# Patient Record
Sex: FEMALE | Race: WHITE | NOT HISPANIC OR LATINO | Employment: OTHER | URBAN - METROPOLITAN AREA
[De-identification: names, ages, dates, MRNs, and addresses within clinical notes are randomized per-mention and may not be internally consistent; named-entity substitution may affect disease eponyms.]

---

## 2018-11-01 ENCOUNTER — APPOINTMENT (OUTPATIENT)
Dept: CT IMAGING | Facility: HOSPITAL | Age: 68
End: 2018-11-01

## 2018-11-01 ENCOUNTER — APPOINTMENT (OUTPATIENT)
Dept: GENERAL RADIOLOGY | Facility: HOSPITAL | Age: 68
End: 2018-11-01

## 2018-11-01 ENCOUNTER — HOSPITAL ENCOUNTER (EMERGENCY)
Facility: HOSPITAL | Age: 68
Discharge: HOME | End: 2018-11-01
Attending: EMERGENCY MEDICINE | Admitting: EMERGENCY MEDICINE

## 2018-11-01 VITALS
BODY MASS INDEX: 21.71 KG/M2 | DIASTOLIC BLOOD PRESSURE: 79 MMHG | WEIGHT: 118 LBS | HEIGHT: 62 IN | SYSTOLIC BLOOD PRESSURE: 114 MMHG | RESPIRATION RATE: 14 BRPM | TEMPERATURE: 97.7 F | OXYGEN SATURATION: 95 % | HEART RATE: 71 BPM

## 2018-11-01 DIAGNOSIS — R07.89 ATYPICAL CHEST PAIN: Primary | ICD-10-CM

## 2018-11-01 DIAGNOSIS — Z72.0 TOBACCO USE: ICD-10-CM

## 2018-11-01 DIAGNOSIS — R03.0 ELEVATED BLOOD PRESSURE READING: ICD-10-CM

## 2018-11-01 LAB
ALBUMIN SERPL-MCNC: 4.44 G/DL (ref 3.2–4.8)
ALBUMIN/GLOB SERPL: 2.2 G/DL (ref 1.5–2.5)
ALP SERPL-CCNC: 88 U/L (ref 25–100)
ALT SERPL W P-5'-P-CCNC: 29 U/L (ref 7–40)
ANION GAP SERPL CALCULATED.3IONS-SCNC: 6 MMOL/L (ref 3–11)
AST SERPL-CCNC: 70 U/L (ref 0–33)
BASOPHILS # BLD AUTO: 0.02 10*3/MM3 (ref 0–0.2)
BASOPHILS NFR BLD AUTO: 0.2 % (ref 0–1)
BILIRUB SERPL-MCNC: 0.1 MG/DL (ref 0.3–1.2)
BNP SERPL-MCNC: 13 PG/ML (ref 0–100)
BUN BLD-MCNC: 9 MG/DL (ref 9–23)
BUN/CREAT SERPL: 9.2 (ref 7–25)
CALCIUM SPEC-SCNC: 9.2 MG/DL (ref 8.7–10.4)
CHLORIDE SERPL-SCNC: 102 MMOL/L (ref 99–109)
CO2 SERPL-SCNC: 26 MMOL/L (ref 20–31)
CREAT BLD-MCNC: 0.98 MG/DL (ref 0.6–1.3)
D DIMER PPP FEU-MCNC: 0.58 MG/L (FEU) (ref 0–0.5)
DEPRECATED RDW RBC AUTO: 50 FL (ref 37–54)
EOSINOPHIL # BLD AUTO: 0.16 10*3/MM3 (ref 0–0.3)
EOSINOPHIL NFR BLD AUTO: 1.9 % (ref 0–3)
ERYTHROCYTE [DISTWIDTH] IN BLOOD BY AUTOMATED COUNT: 13.9 % (ref 11.3–14.5)
GFR SERPL CREATININE-BSD FRML MDRD: 56 ML/MIN/1.73
GLOBULIN UR ELPH-MCNC: 2.1 GM/DL
GLUCOSE BLD-MCNC: 122 MG/DL (ref 70–100)
HCT VFR BLD AUTO: 37.2 % (ref 34.5–44)
HGB BLD-MCNC: 12.1 G/DL (ref 11.5–15.5)
HOLD SPECIMEN: NORMAL
HOLD SPECIMEN: NORMAL
IMM GRANULOCYTES # BLD: 0.01 10*3/MM3 (ref 0–0.03)
IMM GRANULOCYTES NFR BLD: 0.1 % (ref 0–0.6)
LIPASE SERPL-CCNC: 77 U/L (ref 6–51)
LYMPHOCYTES # BLD AUTO: 3.77 10*3/MM3 (ref 0.6–4.8)
LYMPHOCYTES NFR BLD AUTO: 45.8 % (ref 24–44)
MCH RBC QN AUTO: 32 PG (ref 27–31)
MCHC RBC AUTO-ENTMCNC: 32.5 G/DL (ref 32–36)
MCV RBC AUTO: 98.4 FL (ref 80–99)
MONOCYTES # BLD AUTO: 0.47 10*3/MM3 (ref 0–1)
MONOCYTES NFR BLD AUTO: 5.7 % (ref 0–12)
NEUTROPHILS # BLD AUTO: 3.81 10*3/MM3 (ref 1.5–8.3)
NEUTROPHILS NFR BLD AUTO: 46.4 % (ref 41–71)
PLATELET # BLD AUTO: 252 10*3/MM3 (ref 150–450)
PMV BLD AUTO: 10.4 FL (ref 6–12)
POTASSIUM BLD-SCNC: 3.9 MMOL/L (ref 3.5–5.5)
PROT SERPL-MCNC: 6.5 G/DL (ref 5.7–8.2)
RBC # BLD AUTO: 3.78 10*6/MM3 (ref 3.89–5.14)
SODIUM BLD-SCNC: 134 MMOL/L (ref 132–146)
TROPONIN I SERPL-MCNC: 0 NG/ML (ref 0–0.07)
TROPONIN I SERPL-MCNC: 0 NG/ML (ref 0–0.07)
WBC NRBC COR # BLD: 8.23 10*3/MM3 (ref 3.5–10.8)
WHOLE BLOOD HOLD SPECIMEN: NORMAL
WHOLE BLOOD HOLD SPECIMEN: NORMAL

## 2018-11-01 PROCEDURE — 83880 ASSAY OF NATRIURETIC PEPTIDE: CPT | Performed by: EMERGENCY MEDICINE

## 2018-11-01 PROCEDURE — 71045 X-RAY EXAM CHEST 1 VIEW: CPT

## 2018-11-01 PROCEDURE — 85025 COMPLETE CBC W/AUTO DIFF WBC: CPT | Performed by: EMERGENCY MEDICINE

## 2018-11-01 PROCEDURE — 99284 EMERGENCY DEPT VISIT MOD MDM: CPT

## 2018-11-01 PROCEDURE — 80053 COMPREHEN METABOLIC PANEL: CPT | Performed by: EMERGENCY MEDICINE

## 2018-11-01 PROCEDURE — 83690 ASSAY OF LIPASE: CPT | Performed by: EMERGENCY MEDICINE

## 2018-11-01 PROCEDURE — 84484 ASSAY OF TROPONIN QUANT: CPT

## 2018-11-01 PROCEDURE — 85379 FIBRIN DEGRADATION QUANT: CPT | Performed by: EMERGENCY MEDICINE

## 2018-11-01 PROCEDURE — 25010000002 KETOROLAC TROMETHAMINE PER 15 MG: Performed by: EMERGENCY MEDICINE

## 2018-11-01 PROCEDURE — 93005 ELECTROCARDIOGRAM TRACING: CPT | Performed by: EMERGENCY MEDICINE

## 2018-11-01 PROCEDURE — 96374 THER/PROPH/DIAG INJ IV PUSH: CPT

## 2018-11-01 RX ORDER — ASPIRIN 81 MG/1
324 TABLET, CHEWABLE ORAL ONCE
Status: DISCONTINUED | OUTPATIENT
Start: 2018-11-01 | End: 2018-11-01 | Stop reason: HOSPADM

## 2018-11-01 RX ORDER — SODIUM CHLORIDE 0.9 % (FLUSH) 0.9 %
10 SYRINGE (ML) INJECTION AS NEEDED
Status: DISCONTINUED | OUTPATIENT
Start: 2018-11-01 | End: 2018-11-01 | Stop reason: HOSPADM

## 2018-11-01 RX ORDER — PREDNISONE 20 MG/1
20 TABLET ORAL DAILY
Qty: 5 TABLET | Refills: 0 | Status: SHIPPED | OUTPATIENT
Start: 2018-11-01 | End: 2018-12-10

## 2018-11-01 RX ORDER — KETOROLAC TROMETHAMINE 15 MG/ML
7.5 INJECTION, SOLUTION INTRAMUSCULAR; INTRAVENOUS ONCE
Status: COMPLETED | OUTPATIENT
Start: 2018-11-01 | End: 2018-11-01

## 2018-11-01 RX ADMIN — KETOROLAC TROMETHAMINE 7.5 MG: 15 INJECTION, SOLUTION INTRAMUSCULAR; INTRAVENOUS at 19:39

## 2018-11-01 RX ADMIN — NITROGLYCERIN 1 INCH: 20 OINTMENT TOPICAL at 19:38

## 2018-11-01 NOTE — ED PROVIDER NOTES
"Subjective   Angela Warren is a 67 y.o.female who presents to the ED with c/o chest pain with onset three weeks ago. She reports that about three weeks ago, she suddenly began to experience severe left-sided chest pain, which has been constant and sharp in nature. She notes her chest pain radiates down to her left elbow and into her back. The patient also complains of dyspnea of exertion since the onset of her chest pain. She reports using a heating pad for her chest pain, without any relief.  In addition, she complains of associated generalized weakness, nausea, chills, and diaphoresis, and she reports feeling \"clammy\". She also notes that two days ago, she was walking from the kitchen to the living room in her home, when her \"legs suddenly turned into spaghetti,\" and she fell and hit her head on a coffee table. She does not report any LOC from the fall, but she currently complains of a headache and generalized \"soreness\" secondary to the fall. However, she denies any vomiting or any other acute complaints at this time. Ms. Warren has past medical history of MS, for which she sees a Neurologist. She also notes past medical history of anxiety, but denies any history of diabetes mellitus, HTN, or HLD. The patient is a smoker, and she reports she has been smoking on and off since she was 17. She reports she stopped smoking several days ago.        History provided by:  Patient  Chest Pain   Pain location:  L chest  Pain quality: sharp    Pain radiates to:  L arm (back)  Pain severity:  Severe  Onset quality:  Sudden  Duration:  3 weeks  Timing:  Constant  Progression:  Unchanged  Relieved by:  None tried  Worsened by:  Nothing  Ineffective treatments: heating pad.  Associated symptoms: diaphoresis (patient reports feeling \"clammy\"), headache, nausea, shortness of breath and weakness (generalized)    Associated symptoms: no vomiting    Risk factors: smoking    Risk factors: no diabetes mellitus, no high cholesterol and " "no hypertension        Review of Systems   Constitutional: Positive for chills and diaphoresis (patient reports feeling \"clammy\").   Respiratory: Positive for shortness of breath.    Cardiovascular: Positive for chest pain.   Gastrointestinal: Positive for nausea. Negative for vomiting.   Musculoskeletal:        Patient complains of generalized \"soreness\" status post her recent fall   Neurological: Positive for weakness (generalized) and headaches.   All other systems reviewed and are negative.      Past Medical History:   Diagnosis Date   • MS (multiple sclerosis) (CMS/[facility])    • Pneumonia        Allergies   Allergen Reactions   • Penicillins        History reviewed. No pertinent surgical history.    History reviewed. No pertinent family history.    Social History     Social History   • Marital status:      Social History Main Topics   • Smoking status: Current Every Day Smoker     Packs/day: 0.50     Types: Cigarettes   • Smokeless tobacco: Never Used      Comment: \"trying to quit\"   • Alcohol use No   • Drug use: No   • Sexual activity: Defer     Other Topics Concern   • Not on file         Objective   Physical Exam   Constitutional: She is oriented to person, place, and time. She appears well-developed and well-nourished. No distress.   HENT:   Head: Normocephalic and atraumatic.   Airway intact   Eyes: Conjunctivae are normal. No scleral icterus.   Neck: Normal range of motion. Neck supple.   Cardiovascular: Normal rate, regular rhythm, normal heart sounds and intact distal pulses.    No murmur heard.  Pulmonary/Chest: Effort normal. No respiratory distress. She has rales.   Rales in the left lower lungs   Abdominal: Soft. Bowel sounds are normal. There is no tenderness. There is no rebound and no guarding.   Musculoskeletal: Normal range of motion. She exhibits no tenderness.   Strength is 3/5 in upper extremities   Neurological: She is alert and oriented to person, place, and time.   Normal mentation " "  Skin: Skin is warm and dry. She is not diaphoretic.   Psychiatric: She has a normal mood and affect. Her behavior is normal.   Nursing note and vitals reviewed.      Procedures         ED Course  ED Course as of Nov 01 2132   Thu Nov 01, 2018 1918 Troponin I: 0.00 [RS]   2005 D-dimer, Quant: (!) 0.58 [RS]   2015 Reevaluated the patient.  She is quite adversarial.  The patient has a mildly elevated d-dimer and we were planning to perform a CTA of the chest.  However, she is refusing stated she had a reaction in the past to IV contrast.  Required [facility] to give her morphine.  I advised her that the risks of potential pulmonary embolism and not getting an answer to her symptoms.  She advises she understands and will not allow.  Also advised her that our evaluation was continuing for potential coronary artery disease and acute coronary syndrome.  She advised me that she would not allow anything that required diet.  I advised her that if necessary a cardiac catheterization would have to be performed and that uses contrast is well though it is different.  She states that she will refuse it and not have it done.  I advised that at this point we cannot completely rule out an ensure her safety.  She voices understanding of these risks and states she simply will not allow to happen.  We then talked about the potential for a stress test and she states she will do that, but \"that it is extremely boring and I don't feel like we really need to do that\".  I'm not sure that we will satisfy this patient's unrealistic expectations and limitations on our care.  She is agreeable to follow up with cardiology for stress test if we feel is necessary.  I advised her that I didn't feel it was likely necessary    Of note, the patient also refused Lovenox and aspirin.  [RS]   2123 Troponin I: 0.00 [RS]   2123 Patient has 2 sets of negative cardiac biomarkers.  This point, there is no clear evidence of etiology for her symptoms.  " Unfortunately, the patient is refusing the majority of our evaluation and management techniques.  I believe the patient will continue to limit her options.  I did stress with her the importance of following up for stress test and she agrees to do this.  Otherwise, patient is stable with no emergent findings at discharge.  [RS]      ED Course User Index  [RS] Lei Salazar MD     Recent Results (from the past 24 hour(s))   Comprehensive Metabolic Panel    Collection Time: 11/01/18  6:57 PM   Result Value Ref Range    Glucose 122 (H) 70 - 100 mg/dL    BUN 9 9 - 23 mg/dL    Creatinine 0.98 0.60 - 1.30 mg/dL    Sodium 134 132 - 146 mmol/L    Potassium 3.9 3.5 - 5.5 mmol/L    Chloride 102 99 - 109 mmol/L    CO2 26.0 20.0 - 31.0 mmol/L    Calcium 9.2 8.7 - 10.4 mg/dL    Total Protein 6.5 5.7 - 8.2 g/dL    Albumin 4.44 3.20 - 4.80 g/dL    ALT (SGPT) 29 7 - 40 U/L    AST (SGOT) 70 (H) 0 - 33 U/L    Alkaline Phosphatase 88 25 - 100 U/L    Total Bilirubin 0.1 (L) 0.3 - 1.2 mg/dL    eGFR Non African Amer 56 (L) >60 mL/min/1.73    Globulin 2.1 gm/dL    A/G Ratio 2.2 1.5 - 2.5 g/dL    BUN/Creatinine Ratio 9.2 7.0 - 25.0    Anion Gap 6.0 3.0 - 11.0 mmol/L   Lipase    Collection Time: 11/01/18  6:57 PM   Result Value Ref Range    Lipase 77 (H) 6 - 51 U/L   BNP    Collection Time: 11/01/18  6:57 PM   Result Value Ref Range    BNP 13.0 0.0 - 100.0 pg/mL   Light Blue Top    Collection Time: 11/01/18  6:57 PM   Result Value Ref Range    Extra Tube hold for add-on    Green Top (Gel)    Collection Time: 11/01/18  6:57 PM   Result Value Ref Range    Extra Tube Hold for add-ons.    Lavender Top    Collection Time: 11/01/18  6:57 PM   Result Value Ref Range    Extra Tube hold for add-on    Gold Top - SST    Collection Time: 11/01/18  6:57 PM   Result Value Ref Range    Extra Tube Hold for add-ons.    CBC Auto Differential    Collection Time: 11/01/18  6:57 PM   Result Value Ref Range    WBC 8.23 3.50 - 10.80 10*3/mm3    RBC 3.78  (L) 3.89 - 5.14 10*6/mm3    Hemoglobin 12.1 11.5 - 15.5 g/dL    Hematocrit 37.2 34.5 - 44.0 %    MCV 98.4 80.0 - 99.0 fL    MCH 32.0 (H) 27.0 - 31.0 pg    MCHC 32.5 32.0 - 36.0 g/dL    RDW 13.9 11.3 - 14.5 %    RDW-SD 50.0 37.0 - 54.0 fl    MPV 10.4 6.0 - 12.0 fL    Platelets 252 150 - 450 10*3/mm3    Neutrophil % 46.4 41.0 - 71.0 %    Lymphocyte % 45.8 (H) 24.0 - 44.0 %    Monocyte % 5.7 0.0 - 12.0 %    Eosinophil % 1.9 0.0 - 3.0 %    Basophil % 0.2 0.0 - 1.0 %    Immature Grans % 0.1 0.0 - 0.6 %    Neutrophils, Absolute 3.81 1.50 - 8.30 10*3/mm3    Lymphocytes, Absolute 3.77 0.60 - 4.80 10*3/mm3    Monocytes, Absolute 0.47 0.00 - 1.00 10*3/mm3    Eosinophils, Absolute 0.16 0.00 - 0.30 10*3/mm3    Basophils, Absolute 0.02 0.00 - 0.20 10*3/mm3    Immature Grans, Absolute 0.01 0.00 - 0.03 10*3/mm3   D-dimer, Quantitative    Collection Time: 11/01/18  6:57 PM   Result Value Ref Range    D-Dimer, Quantitative 0.58 (H) 0.00 - 0.50 mg/L (FEU)   POC Troponin, Rapid    Collection Time: 11/01/18  6:58 PM   Result Value Ref Range    Troponin I 0.00 0.00 - 0.07 ng/mL   POC Troponin, Rapid    Collection Time: 11/01/18  8:56 PM   Result Value Ref Range    Troponin I 0.00 0.00 - 0.07 ng/mL     Note: In addition to lab results from this visit, the labs listed above may include labs taken at another facility or during a different encounter within the last 24 hours. Please correlate lab times with ED admission and discharge times for further clarification of the services performed during this visit.    XR Chest 1 View   Final Result   Mild basilar interstitial changes, likely chronic. No   clearly acute chest disease is seen.       This report was finalized on 11/1/2018 8:04 PM by DR. Rafael Dias MD.            Vitals:    11/01/18 1847 11/01/18 1848   BP:  144/70   BP Location:  Right arm   Patient Position:  Sitting   Pulse:  85   Resp:  14   Temp:  97.7 °F (36.5 °C)   TempSrc:  Oral   SpO2:  95%   Weight: 53.5 kg (118 lb)   "  Height: 157.5 cm (62\")      Medications   sodium chloride 0.9 % flush 10 mL (not administered)   aspirin chewable tablet 324 mg (324 mg Oral Not Given 11/1/18 1933)   enoxaparin (LOVENOX) syringe 50 mg (50 mg Subcutaneous Not Given 11/1/18 1938)   nitroglycerin (NITROSTAT) ointment 1 inch (1 inch Topical Given 11/1/18 1938)   ketorolac (TORADOL) injection 7.5 mg (7.5 mg Intravenous Given 11/1/18 1939)     ECG/EMG Results (last 24 hours)     Procedure Component Value Units Date/Time    ECG 12 Lead [[id number]] Collected:  11/01/18 1859     Updated:  11/01/18 1857                  HEART Score (for prediction of 6-week risk of major adverse cardiac event) reviewed and/or performed as part of the patient evaluation and treatment planning process.  The result associated with this review/performance is: 3           MDM  Number of Diagnoses or Management Options  Atypical chest pain:   Elevated blood pressure reading:   Tobacco use:   Diagnosis management comments: ECG/EMG Results (last 24 hours)     Procedure Component Value Units Date/Time    ECG 12 Lead ([id number]) Collected:  11/01/18 1859     Updated:  11/01/18 1857    ECG 12 Lead ([id number]) Collected:  11/01/18 2101     Updated:  11/01/18 2100             Amount and/or Complexity of Data Reviewed  Clinical lab tests: reviewed  Tests in the radiology section of CPT®: reviewed  Review and summarize past medical records: yes  Independent visualization of images, tracings, or specimens: yes        Final diagnoses:   Atypical chest pain   Elevated blood pressure reading   Tobacco use       Documentation assistance provided by kathy Wilde.  Information recorded by the kathy was done at my direction and has been verified and validated by me.     Godwin Wilde  11/01/18 1924       Lei Salazar MD  11/01/18 2132    "

## 2018-12-10 ENCOUNTER — OFFICE VISIT (OUTPATIENT)
Dept: FAMILY MEDICINE CLINIC | Facility: CLINIC | Age: 68
End: 2018-12-10

## 2018-12-10 VITALS
OXYGEN SATURATION: 97 % | SYSTOLIC BLOOD PRESSURE: 138 MMHG | BODY MASS INDEX: 22.08 KG/M2 | DIASTOLIC BLOOD PRESSURE: 84 MMHG | HEIGHT: 62 IN | WEIGHT: 120 LBS | HEART RATE: 68 BPM

## 2018-12-10 DIAGNOSIS — I10 ESSENTIAL HYPERTENSION: Primary | ICD-10-CM

## 2018-12-10 DIAGNOSIS — R00.2 PALPITATIONS: ICD-10-CM

## 2018-12-10 DIAGNOSIS — I20.8 OTHER FORMS OF ANGINA PECTORIS (HCC): ICD-10-CM

## 2018-12-10 PROBLEM — I20.89 OTHER FORMS OF ANGINA PECTORIS: Status: ACTIVE | Noted: 2018-12-10

## 2018-12-10 LAB
ALBUMIN SERPL-MCNC: 5.08 G/DL (ref 3.2–4.8)
ALBUMIN/GLOB SERPL: 2.6 G/DL (ref 1.5–2.5)
ALP SERPL-CCNC: 100 U/L (ref 25–100)
ALT SERPL W P-5'-P-CCNC: 18 U/L (ref 7–40)
ANION GAP SERPL CALCULATED.3IONS-SCNC: 7 MMOL/L (ref 3–11)
AST SERPL-CCNC: 27 U/L (ref 0–33)
BILIRUB SERPL-MCNC: 0.2 MG/DL (ref 0.3–1.2)
BUN BLD-MCNC: 17 MG/DL (ref 9–23)
BUN/CREAT SERPL: 19.3 (ref 7–25)
CALCIUM SPEC-SCNC: 10 MG/DL (ref 8.7–10.4)
CHLORIDE SERPL-SCNC: 105 MMOL/L (ref 99–109)
CO2 SERPL-SCNC: 28 MMOL/L (ref 20–31)
CREAT BLD-MCNC: 0.88 MG/DL (ref 0.6–1.3)
GFR SERPL CREATININE-BSD FRML MDRD: 64 ML/MIN/1.73
GLOBULIN UR ELPH-MCNC: 1.9 GM/DL
GLUCOSE BLD-MCNC: 87 MG/DL (ref 70–100)
POTASSIUM BLD-SCNC: 4.8 MMOL/L (ref 3.5–5.5)
PROT SERPL-MCNC: 7 G/DL (ref 5.7–8.2)
SODIUM BLD-SCNC: 140 MMOL/L (ref 132–146)

## 2018-12-10 PROCEDURE — 80053 COMPREHEN METABOLIC PANEL: CPT | Performed by: FAMILY MEDICINE

## 2018-12-10 PROCEDURE — 99204 OFFICE O/P NEW MOD 45 MIN: CPT | Performed by: FAMILY MEDICINE

## 2018-12-10 RX ORDER — LISINOPRIL 5 MG/1
5 TABLET ORAL DAILY
Qty: 30 TABLET | Refills: 5 | Status: SHIPPED | OUTPATIENT
Start: 2018-12-10 | End: 2019-01-07

## 2018-12-10 RX ORDER — ALPRAZOLAM 1 MG/1
1 TABLET ORAL 3 TIMES DAILY PRN
COMMUNITY
End: 2019-11-25

## 2018-12-10 NOTE — PROGRESS NOTES
Angela Diaz Parent presents today to establish care.    Chief Complaint   Patient presents with   • Establish Care   • Hypertension     recently        HPI   Establishing care:  Colonoscopy last done 2013 had polyps, has specialist at [place].  She has MS and is followed by neurology as well as pain clinic.    HTN:  Always had /70 until recently. 11/1/18 had chest pains and into left arm. Went to ER  /80 per patient (114/79 in chart). /80 at neurologist office a few weeks ago. Denies recent stress.  Has heart palpitations and heart murmur.  Family history of hypertension.    MS:  Legs hurt so bad at night has to get up and walk the floor. Taking tizanidine at night for cramps, doesn't help. Tried heat and epsom salt soaks. Hands draw up and cramp.     Review of Systems   Constitutional: Positive for fatigue.   HENT: Positive for hearing loss.    Eyes: Negative.    Respiratory: Positive for shortness of breath.    Cardiovascular: Positive for chest pain and palpitations.   Gastrointestinal: Positive for constipation.   Endocrine: Negative.    Genitourinary: Positive for frequency.   Musculoskeletal: Positive for arthralgias and myalgias.   Skin: Negative.    Neurological: Positive for dizziness, weakness, numbness and headache.   Hematological: Negative.    Psychiatric/Behavioral: Positive for depressed mood. The patient is nervous/anxious.         Past Medical History:   Diagnosis Date   • Acid reflux    • Arthritis    • Cataract    • Colon polyp    • Depression    • GERD (gastroesophageal reflux disease)    • Headache    • Heart murmur    • History of blood transfusion 1971    after surgery    • Hypertension    • MS (multiple sclerosis) (CMS/HCC)    • Pneumonia         Past Surgical History:   Procedure Laterality Date   • CATARACT EXTRACTION  2009   • CERVICAL BIOPSY  1971    benign   • HEMORRHOIDECTOMY  1971   • TUBAL ABDOMINAL LIGATION  1985        Family History   Problem Relation Age of  "Onset   • Thyroid disease Mother    • Hyperlipidemia Sister    • Thyroid disease Sister    • Other Sister         parathyroid disease   • Hypertension Brother    • Migraines Brother    • Other Brother         kidney stones   • Other Niece         parathyroid disease        Social History     Socioeconomic History   • Marital status:      Spouse name: Not on file   • Number of children: Not on file   • Years of education: Not on file   • Highest education level: Not on file   Social Needs   • Financial resource strain: Not on file   • Food insecurity - worry: Not on file   • Food insecurity - inability: Not on file   • Transportation needs - medical: Not on file   • Transportation needs - non-medical: Not on file   Occupational History   • Not on file   Tobacco Use   • Smoking status: Current Every Day Smoker     Packs/day: 0.50     Types: Cigarettes   • Smokeless tobacco: Never Used   • Tobacco comment: \"trying to quit\"   Substance and Sexual Activity   • Alcohol use: No   • Drug use: No   • Sexual activity: Defer   Other Topics Concern   • Not on file   Social History Narrative   • Not on file        Current Outpatient Medications on File Prior to Visit   Medication Sig Dispense Refill   • ALPRAZolam (XANAX) 1 MG tablet Take 1 mg by mouth 3 (Three) Times a Day As Needed for Anxiety.     • HYDROcodone-acetaminophen (NORCO) 5-325 MG per tablet Take 1 tablet by mouth Every 6 (Six) Hours As Needed.     • interferon beta-1A (REBIF) 44 MCG/0.5ML injection Inject  under the skin 3 (Three) Times a Week.     • QUEtiapine Fumarate (SEROQUEL PO) Take  by mouth.     • tiZANidine (ZANAFLEX) 4 MG tablet Take 4 mg by mouth At Night As Needed for Muscle Spasms.     • [DISCONTINUED] diclofenac (VOLTAREN) 50 MG EC tablet Take 1 tablet by mouth 3 (Three) Times a Day As Needed (pain). 15 tablet 0   • [DISCONTINUED] predniSONE (DELTASONE) 20 MG tablet Take 1 tablet by mouth Daily. 5 tablet 0     No current facility-administered " "medications on file prior to visit.        Allergies   Allergen Reactions   • Penicillins         Visit Vitals  /84 (BP Location: Left arm, Patient Position: Sitting, Cuff Size: Adult)   Pulse 68   Ht 157.5 cm (62\")   Wt 54.4 kg (120 lb)   SpO2 97%   BMI 21.95 kg/m²        Physical Exam   Constitutional: She is oriented to person, place, and time. No distress.   HENT:   Nose: Nose normal.   Mouth/Throat: Oropharynx is clear and moist.   Eyes: Conjunctivae are normal. Pupils are equal, round, and reactive to light.   Neck: Neck supple. No thyromegaly present.   Cardiovascular: Normal rate and regular rhythm.   No murmur heard.  Pulses:       Posterior tibial pulses are 2+ on the right side, and 2+ on the left side.   Pulmonary/Chest: Effort normal and breath sounds normal.   Abdominal: Soft. There is no hepatosplenomegaly. There is no tenderness.   Musculoskeletal: She exhibits no edema.   Lymphadenopathy:     She has no cervical adenopathy.   Neurological: She is alert and oriented to person, place, and time.   Skin: Skin is warm and dry. No rash noted.   Psychiatric: She has a normal mood and affect. Her behavior is normal. Judgment and thought content normal.   Vitals reviewed.       Results for orders placed or performed during the hospital encounter of 11/01/18   Comprehensive Metabolic Panel   Result Value Ref Range    Glucose 122 (H) 70 - 100 mg/dL    BUN 9 9 - 23 mg/dL    Creatinine 0.98 0.60 - 1.30 mg/dL    Sodium 134 132 - 146 mmol/L    Potassium 3.9 3.5 - 5.5 mmol/L    Chloride 102 99 - 109 mmol/L    CO2 26.0 20.0 - 31.0 mmol/L    Calcium 9.2 8.7 - 10.4 mg/dL    Total Protein 6.5 5.7 - 8.2 g/dL    Albumin 4.44 3.20 - 4.80 g/dL    ALT (SGPT) 29 7 - 40 U/L    AST (SGOT) 70 (H) 0 - 33 U/L    Alkaline Phosphatase 88 25 - 100 U/L    Total Bilirubin 0.1 (L) 0.3 - 1.2 mg/dL    eGFR Non African Amer 56 (L) >60 mL/min/1.73    Globulin 2.1 gm/dL    A/G Ratio 2.2 1.5 - 2.5 g/dL    BUN/Creatinine Ratio 9.2 7.0 - " 25.0    Anion Gap 6.0 3.0 - 11.0 mmol/L   Lipase   Result Value Ref Range    Lipase 77 (H) 6 - 51 U/L   BNP   Result Value Ref Range    BNP 13.0 0.0 - 100.0 pg/mL   CBC Auto Differential   Result Value Ref Range    WBC 8.23 3.50 - 10.80 10*3/mm3    RBC 3.78 (L) 3.89 - 5.14 10*6/mm3    Hemoglobin 12.1 11.5 - 15.5 g/dL    Hematocrit 37.2 34.5 - 44.0 %    MCV 98.4 80.0 - 99.0 fL    MCH 32.0 (H) 27.0 - 31.0 pg    MCHC 32.5 32.0 - 36.0 g/dL    RDW 13.9 11.3 - 14.5 %    RDW-SD 50.0 37.0 - 54.0 fl    MPV 10.4 6.0 - 12.0 fL    Platelets 252 150 - 450 10*3/mm3    Neutrophil % 46.4 41.0 - 71.0 %    Lymphocyte % 45.8 (H) 24.0 - 44.0 %    Monocyte % 5.7 0.0 - 12.0 %    Eosinophil % 1.9 0.0 - 3.0 %    Basophil % 0.2 0.0 - 1.0 %    Immature Grans % 0.1 0.0 - 0.6 %    Neutrophils, Absolute 3.81 1.50 - 8.30 10*3/mm3    Lymphocytes, Absolute 3.77 0.60 - 4.80 10*3/mm3    Monocytes, Absolute 0.47 0.00 - 1.00 10*3/mm3    Eosinophils, Absolute 0.16 0.00 - 0.30 10*3/mm3    Basophils, Absolute 0.02 0.00 - 0.20 10*3/mm3    Immature Grans, Absolute 0.01 0.00 - 0.03 10*3/mm3   D-dimer, Quantitative   Result Value Ref Range    D-Dimer, Quantitative 0.58 (H) 0.00 - 0.50 mg/L (FEU)   POC Troponin, Rapid   Result Value Ref Range    Troponin I 0.00 0.00 - 0.07 ng/mL   POC Troponin, Rapid   Result Value Ref Range    Troponin I 0.00 0.00 - 0.07 ng/mL   Light Blue Top   Result Value Ref Range    Extra Tube hold for add-on    Green Top (Gel)   Result Value Ref Range    Extra Tube Hold for add-ons.    Lavender Top   Result Value Ref Range    Extra Tube hold for add-on    Gold Top - SST   Result Value Ref Range    Extra Tube Hold for add-ons.         Angela was seen today for establish care and hypertension.  Prior records requested.  Diagnoses and all orders for this visit:    Essential hypertension  -     Comprehensive Metabolic Panel; Future  -     Adult Transthoracic Echo Limited W/ Cont if Necessary Per Protocol; Future  -     lisinopril  (PRINIVIL,ZESTRIL) 5 MG tablet; Take 1 tablet by mouth Daily.  -     Comprehensive Metabolic Panel  New.  Patient prefers not to take medications with blockers including beta blockers or calcium channel blockers.  Start lisinopril and cautioned on side effects to monitor for.  Reviewed ER records showing she had declined CT scan to rule out PE, stress testing, and she has canceled cardiology appointments.  Patient is interested in further workup in January but not at this time.  Check labs today and schedule echo and Holter monitor.  She will consider cardiology evaluation again but I did not place a new referral as of yet.   Palpitations  -     Holter monitor - 48 hour; Future  New. Patient is interested in further workup in January but not at this time.  Check labs today and schedule echo and Holter monitor.   Other forms of angina pectoris (CMS/[facility])   -     Adult Transthoracic Echo Limited W/ Cont if Necessary Per Protocol; Future   Patient is interested in further workup in January but not at this time.  Check labs today and schedule echo and Holter monitor.  She will consider cardiology evaluation again but I did not place a new referral as of yet.         Return in about 4 weeks (around 1/7/2019) for Follow-up HTN, heart tests.

## 2018-12-11 PROBLEM — N18.2 CKD (CHRONIC KIDNEY DISEASE) STAGE 2, GFR 60-89 ML/MIN: Status: ACTIVE | Noted: 2018-01-01

## 2019-01-03 ENCOUNTER — HOSPITAL ENCOUNTER (OUTPATIENT)
Dept: CARDIOLOGY | Facility: HOSPITAL | Age: 69
Discharge: HOME | End: 2019-01-03
Admitting: FAMILY MEDICINE

## 2019-01-03 DIAGNOSIS — I20.8 OTHER FORMS OF ANGINA PECTORIS (HCC): ICD-10-CM

## 2019-01-03 DIAGNOSIS — I10 ESSENTIAL HYPERTENSION: ICD-10-CM

## 2019-01-03 LAB
BH CV ECHO MEAS - AO ROOT AREA (BSA CORRECTED): 1.4
BH CV ECHO MEAS - AO ROOT AREA: 3.6 CM^2
BH CV ECHO MEAS - AO ROOT DIAM: 2.1 CM
BH CV ECHO MEAS - BSA(HAYCOCK): 1.5 M^2
BH CV ECHO MEAS - BSA: 1.5 M^2
BH CV ECHO MEAS - BZI_BMI: 21.9 KILOGRAMS/M^2
BH CV ECHO MEAS - BZI_METRIC_HEIGHT: 157.5 CM
BH CV ECHO MEAS - BZI_METRIC_WEIGHT: 54.4 KG
BH CV ECHO MEAS - EDV(CUBED): 71.6 ML
BH CV ECHO MEAS - EDV(TEICH): 76.5 ML
BH CV ECHO MEAS - EF(CUBED): 68.7 %
BH CV ECHO MEAS - EF(TEICH): 60.7 %
BH CV ECHO MEAS - ESV(CUBED): 22.4 ML
BH CV ECHO MEAS - ESV(TEICH): 30 ML
BH CV ECHO MEAS - FS: 32.1 %
BH CV ECHO MEAS - IVS/LVPW: 0.99
BH CV ECHO MEAS - IVSD: 1 CM
BH CV ECHO MEAS - LA DIMENSION: 3 CM
BH CV ECHO MEAS - LA/AO: 1.4
BH CV ECHO MEAS - LAD MAJOR: 3.2 CM
BH CV ECHO MEAS - LAT PEAK E' VEL: 11.1 CM/SEC
BH CV ECHO MEAS - LATERAL E/E' RATIO: 8.1
BH CV ECHO MEAS - LV MASS(C)D: 135.8 GRAMS
BH CV ECHO MEAS - LV MASS(C)DI: 88.3 GRAMS/M^2
BH CV ECHO MEAS - LVIDD: 4.2 CM
BH CV ECHO MEAS - LVIDS: 2.8 CM
BH CV ECHO MEAS - LVPWD: 1 CM
BH CV ECHO MEAS - MED PEAK E' VEL: 8.9 CM/SEC
BH CV ECHO MEAS - MEDIAL E/E' RATIO: 10
BH CV ECHO MEAS - MV A MAX VEL: 85.9 CM/SEC
BH CV ECHO MEAS - MV DEC SLOPE: 283.4 CM/SEC^2
BH CV ECHO MEAS - MV DEC TIME: 0.23 SEC
BH CV ECHO MEAS - MV E MAX VEL: 90.8 CM/SEC
BH CV ECHO MEAS - MV E/A: 1.1
BH CV ECHO MEAS - MV P1/2T MAX VEL: 113.4 CM/SEC
BH CV ECHO MEAS - MV P1/2T: 117.2 MSEC
BH CV ECHO MEAS - MVA P1/2T LCG: 1.9 CM^2
BH CV ECHO MEAS - MVA(P1/2T): 1.9 CM^2
BH CV ECHO MEAS - RAP SYSTOLE: 3 MMHG
BH CV ECHO MEAS - RV MAX PG: 0.56 MMHG
BH CV ECHO MEAS - RV V1 MAX: 37.5 CM/SEC
BH CV ECHO MEAS - RVSP: 24 MMHG
BH CV ECHO MEAS - SI(CUBED): 32 ML/M^2
BH CV ECHO MEAS - SI(TEICH): 30.2 ML/M^2
BH CV ECHO MEAS - SV(CUBED): 49.2 ML
BH CV ECHO MEAS - SV(TEICH): 46.4 ML
BH CV ECHO MEAS - TAPSE (>1.6): 2.1 CM2
BH CV ECHO MEAS - TR MAX PG: 21 MMHG
BH CV ECHO MEAS - TR MAX VEL: 225.3 CM/SEC
BH CV ECHO MEASUREMENTS AVERAGE E/E' RATIO: 9.08
BH CV XLRA - RV BASE: 2.9 CM
BH CV XLRA - RV LENGTH: 6.6 CM
BH CV XLRA - RV MID: 2.6 CM
BH CV XLRA - TDI S': 12.6 CM/SEC
LEFT ATRIUM VOLUME INDEX: 11.7 ML/M^2
LEFT ATRIUM VOLUME: 18 ML

## 2019-01-03 PROCEDURE — 93306 TTE W/DOPPLER COMPLETE: CPT

## 2019-01-03 PROCEDURE — 93306 TTE W/DOPPLER COMPLETE: CPT | Performed by: INTERNAL MEDICINE

## 2019-01-04 PROBLEM — I35.1 MILD AORTIC REGURGITATION: Status: ACTIVE | Noted: 2019-01-03

## 2019-01-07 ENCOUNTER — OFFICE VISIT (OUTPATIENT)
Dept: FAMILY MEDICINE CLINIC | Facility: CLINIC | Age: 69
End: 2019-01-07

## 2019-01-07 VITALS
HEART RATE: 73 BPM | DIASTOLIC BLOOD PRESSURE: 76 MMHG | BODY MASS INDEX: 22.45 KG/M2 | SYSTOLIC BLOOD PRESSURE: 124 MMHG | WEIGHT: 122 LBS | OXYGEN SATURATION: 97 % | HEIGHT: 62 IN

## 2019-01-07 DIAGNOSIS — I35.1 MILD AORTIC REGURGITATION: ICD-10-CM

## 2019-01-07 DIAGNOSIS — I10 ESSENTIAL HYPERTENSION: Primary | ICD-10-CM

## 2019-01-07 DIAGNOSIS — I20.8 OTHER FORMS OF ANGINA PECTORIS (HCC): ICD-10-CM

## 2019-01-07 DIAGNOSIS — R00.2 PALPITATIONS: ICD-10-CM

## 2019-01-07 PROCEDURE — 99213 OFFICE O/P EST LOW 20 MIN: CPT | Performed by: FAMILY MEDICINE

## 2019-01-07 NOTE — PROGRESS NOTES
Chief Complaint   Patient presents with   • Hypertension     4 week follow up, stopped Lisinopril due to cough and break outs        Hypertension   This is a recurrent problem. The current episode started more than 1 month ago. The problem has been rapidly improving since onset. The problem is controlled. Associated symptoms include chest pain and palpitations. Current antihypertension treatment includes nothing. Hypertensive end-organ damage includes kidney disease. There is no history of CAD/MI, heart failure or left ventricular hypertrophy.   3rd to 4th day had a cough and breaking out on face and neck and stopped lisinopril.  Monitoring blood pressure at home and it has come down. Under stress with sick family member and close person . Heart races when she is stressed.     Heart hurts but denies chest pain. Onset 6 months ago.     Lives on vegetables and fruit, lots of juice. No salt in food.     Review of Systems   Respiratory: Negative for cough.    Cardiovascular: Positive for chest pain and palpitations.   Psychiatric/Behavioral: Positive for stress.        Current Outpatient Medications on File Prior to Visit   Medication Sig Dispense Refill   • ALPRAZolam (XANAX) 1 MG tablet Take 1 mg by mouth 3 (Three) Times a Day As Needed for Anxiety.     • HYDROcodone-acetaminophen (NORCO) 5-325 MG per tablet Take 1 tablet by mouth Every 6 (Six) Hours As Needed.     • interferon beta-1A (REBIF) 44 MCG/0.5ML injection Inject  under the skin 3 (Three) Times a Week.     • QUEtiapine Fumarate (SEROQUEL PO) Take  by mouth.     • tiZANidine (ZANAFLEX) 4 MG tablet Take 4 mg by mouth At Night As Needed for Muscle Spasms.     • [DISCONTINUED] lisinopril (PRINIVIL,ZESTRIL) 5 MG tablet Take 1 tablet by mouth Daily. 30 tablet 5     No current facility-administered medications on file prior to visit.        Allergies   Allergen Reactions   • Penicillins    • Lisinopril Rash and Cough       Past Medical History:   Diagnosis Date  "  • Acid reflux    • Arthritis    • Cataract    • CKD (chronic kidney disease) stage 2, GFR 60-89 ml/min 2018   • Colon polyp    • Depression    • GERD (gastroesophageal reflux disease)    • Headache    • Heart murmur    • History of blood transfusion 1971    after surgery    • Hypertension 2018   • Mild aortic regurgitation 01/03/2019   • MS (multiple sclerosis) (CMS/[facility])    • Pneumonia         Past Surgical History:   Procedure Laterality Date   • CATARACT EXTRACTION  2009   • CERVICAL BIOPSY  1971    benign   • HEMORRHOIDECTOMY  1971   • TUBAL ABDOMINAL LIGATION  1985        Family History   Problem Relation Age of Onset   • Thyroid disease Mother    • Hyperlipidemia Sister    • Thyroid disease Sister    • Other Sister         parathyroid disease   • Hypertension Brother    • Migraines Brother    • Other Brother         kidney stones   • Other Niece         parathyroid disease        Social History     Socioeconomic History   • Marital status:      Spouse name: Not on file   • Number of children: Not on file   • Years of education: Not on file   • Highest education level: Not on file   Social Needs   • Financial resource strain: Not on file   • Food insecurity - worry: Not on file   • Food insecurity - inability: Not on file   • Transportation needs - medical: Not on file   • Transportation needs - non-medical: Not on file   Occupational History   • Not on file   Tobacco Use   • Smoking status: Current Every Day Smoker     Packs/day: 0.50     Types: Cigarettes   • Smokeless tobacco: Never Used   • Tobacco comment: \"trying to quit\"   Substance and Sexual Activity   • Alcohol use: No   • Drug use: No   • Sexual activity: Defer   Other Topics Concern   • Not on file   Social History Narrative   • Not on file        Visit Vitals  /76 (BP Location: Left arm, Patient Position: Sitting, Cuff Size: Adult)   Pulse 73   Ht 157.5 cm (62\")   Wt 55.3 kg (122 lb)   SpO2 97%   BMI 22.31 kg/m²        Physical Exam "   Constitutional: No distress.   Cardiovascular: Normal rate and regular rhythm.   No murmur heard.  Pulmonary/Chest: Effort normal and breath sounds normal.   Psychiatric: She has a normal mood and affect.   Vitals reviewed.      Results for orders placed or performed during the hospital encounter of 01/03/19   Adult Transthoracic Echo Complete W/ Cont if Necessary Per Protocol   Result Value Ref Range    BSA 1.5 m^2    IVSd 1.0 cm    LVIDd 4.2 cm    LVIDs 2.8 cm    LVPWd 1.0 cm    IVS/LVPW 0.99     FS 32.1 %    EDV(Teich) 76.5 ml    ESV(Teich) 30.0 ml    EF(Teich) 60.7 %    EDV(cubed) 71.6 ml    ESV(cubed) 22.4 ml    EF(cubed) 68.7 %    LV mass(C)d 135.8 grams    LV mass(C)dI 88.3 grams/m^2    SV(Teich) 46.4 ml    SI(Teich) 30.2 ml/m^2    SV(cubed) 49.2 ml    SI(cubed) 32.0 ml/m^2    Ao root diam 2.1 cm    Ao root area 3.6 cm^2    LA dimension 3.0 cm    LA/Ao 1.4     LAd major 3.2 cm    LA volume 18.0 ml    Ao root area (BSA corrected) 1.4     LA Volume Index 11.7 ml/m^2    MV E max iraj 90.8 cm/sec    MV A max iraj 85.9 cm/sec    MV E/A 1.1     MV P1/2t max iraj 113.4 cm/sec    MV P1/2t 117.2 msec    MVA(P1/2t) 1.9 cm^2    MV dec slope 283.4 cm/sec^2    MV dec time 0.23 sec    RV V1 max PG 0.56 mmHg    RV V1 max 37.5 cm/sec    TR max iraj 225.3 cm/sec     CV ECHO SEKOU - TR MAX PG 21.0 mmHg    RVSP(TR) 24.0 mmHg    RAP systole 3.0 mmHg    MVA P1/2T LCG 1.9 cm^2    Lat E/e'  8.1     Med E/e' 10.0     Lat Peak E' Iraj 11.1 cm/sec    Med Peak E' Iraj 8.9 cm/sec     CV ECHO SEKOU - BZI_BMI 21.9 kilograms/m^2    BH CV ECHO SEKOU - BSA(HAYCOCK) 1.5 m^2    BH CV ECHO SEKOU - BZI_METRIC_WEIGHT 54.4 kg    BH CV ECHO SEKOU - BZI_METRIC_HEIGHT 157.5 cm    Avg E/e' ratio 9.08     TDI S' 12.60 cm/sec    RV Base 2.90 cm    RV Length 6.60 cm    RV Mid 2.60 cm    TAPSE (>1.6) 2.10 cm2   Interpretation Summary     · Left ventricular systolic function is normal.  · Estimated EF appears to be in the range of 61 - 65%.  · Mild aortic valve  regurgitation is present.            Angela was seen today for hypertension.    Diagnoses and all orders for this visit:    Essential hypertension  Improved.  Side effects with lisinopril and added to intolerance list.  If her blood pressure becomes elevated again will consider treatment with alternative medication such as ARB.   Mild aortic regurgitation  New. Reviewed echo results with patient.  Discussed mild finding at this time and no further workup needed.  Palpitations  Intermittent.  Likely related to stress.  She has yet to return Holter monitor and we will contact her once results are available.  Other forms of angina pectoris (CMS/[facility])   Chronic.  Patient reports that she has heart pain and denies chest pain.  She declines further evaluation at this time.  Discussed if she changes her mind and is interested I will refer her to a cardiologist.      Return in about 6 months (around 7/7/2019) for Follow-up.

## 2019-04-29 ENCOUNTER — OFFICE VISIT (OUTPATIENT)
Dept: FAMILY MEDICINE CLINIC | Facility: CLINIC | Age: 69
End: 2019-04-29

## 2019-04-29 VITALS
DIASTOLIC BLOOD PRESSURE: 80 MMHG | WEIGHT: 124 LBS | OXYGEN SATURATION: 97 % | HEIGHT: 62 IN | HEART RATE: 94 BPM | SYSTOLIC BLOOD PRESSURE: 122 MMHG | BODY MASS INDEX: 22.82 KG/M2

## 2019-04-29 DIAGNOSIS — M79.602 LEFT ARM PAIN: ICD-10-CM

## 2019-04-29 DIAGNOSIS — N63.0 BREAST MASS: Primary | ICD-10-CM

## 2019-04-29 DIAGNOSIS — N64.4 BREAST PAIN: ICD-10-CM

## 2019-04-29 DIAGNOSIS — I10 ESSENTIAL HYPERTENSION: ICD-10-CM

## 2019-04-29 PROCEDURE — 99214 OFFICE O/P EST MOD 30 MIN: CPT | Performed by: FAMILY MEDICINE

## 2019-04-29 RX ORDER — LOSARTAN POTASSIUM 25 MG/1
25 TABLET ORAL DAILY
Qty: 90 TABLET | Refills: 1 | Status: SHIPPED | OUTPATIENT
Start: 2019-04-29 | End: 2019-10-17 | Stop reason: SDUPTHER

## 2019-04-29 NOTE — PROGRESS NOTES
Chief Complaint   Patient presents with   • Shoulder Pain     left arm pain x 6 months, lump under left armpit        Arm Pain    The incident occurred more than 1 week ago ( 6 months). There was no injury mechanism. The pain is present in the left shoulder. The quality of the pain is described as aching (dull). The pain radiates to the left arm. The pain is severe. Associated symptoms include tingling ( fingers). She has tried heat (narcotics) for the symptoms. The treatment provided no relief.   Hypertension   This is a chronic problem. The current episode started more than 1 month ago. The problem is controlled. Current antihypertension treatment includes angiotensin blockers.      Lump:  Lump under left armpit for past 3 weeks is very painful even taking narcotics. Throbbing pain.  Her last mammogram has been 4 to 5 years ago.    Review of Systems   Genitourinary: Negative for breast lump.   Musculoskeletal: Positive for arthralgias.   Neurological: Positive for tingling ( fingers).   Hematological: Positive for adenopathy.          Current Outpatient Medications on File Prior to Visit   Medication Sig Dispense Refill   • ALPRAZolam (XANAX) 1 MG tablet Take 1 mg by mouth 3 (Three) Times a Day As Needed for Anxiety.     • HYDROcodone-acetaminophen (NORCO)  MG per tablet      • interferon beta-1A (REBIF) 44 MCG/0.5ML injection Inject  under the skin 3 (Three) Times a Week.     • QUEtiapine (SEROquel) 100 MG tablet      • RELISTOR 150 MG tablet      • tiZANidine (ZANAFLEX) 4 MG tablet Take 4 mg by mouth At Night As Needed for Muscle Spasms.     • [DISCONTINUED] losartan (COZAAR) 25 MG tablet Take 1 tablet by mouth Daily. 30 tablet 0     No current facility-administered medications on file prior to visit.        Allergies   Allergen Reactions   • Penicillins    • Lisinopril Rash and Cough       Past Medical History:   Diagnosis Date   • Acid reflux    • Arthritis    • Cataract    • CKD (chronic kidney disease)  "stage 2, GFR 60-89 ml/min 2018   • Colon polyp    • Depression    • GERD (gastroesophageal reflux disease)    • Headache    • Heart murmur    • History of blood transfusion 1971    after surgery    • Hypertension 2018   • Mild aortic regurgitation 01/03/2019   • MS (multiple sclerosis) (CMS/HCC)    • Pneumonia         Past Surgical History:   Procedure Laterality Date   • CATARACT EXTRACTION  2009   • CERVICAL BIOPSY  1971    benign   • HEMORRHOIDECTOMY  1971   • TUBAL ABDOMINAL LIGATION  1985        Family History   Problem Relation Age of Onset   • Thyroid disease Mother    • Hyperlipidemia Sister    • Thyroid disease Sister    • Other Sister         parathyroid disease   • Hypertension Brother    • Migraines Brother    • Other Brother         kidney stones   • Other Niece         parathyroid disease        Social History     Socioeconomic History   • Marital status:      Spouse name: Not on file   • Number of children: Not on file   • Years of education: Not on file   • Highest education level: Not on file   Tobacco Use   • Smoking status: Current Every Day Smoker     Packs/day: 0.50     Types: Cigarettes   • Smokeless tobacco: Never Used   • Tobacco comment: \"trying to quit\"   Substance and Sexual Activity   • Alcohol use: No   • Drug use: No   • Sexual activity: Defer        Visit Vitals  /80 (BP Location: Left arm, Patient Position: Sitting, Cuff Size: Adult)   Pulse 94   Ht 157.5 cm (62\")   Wt 56.2 kg (124 lb)   SpO2 97%   BMI 22.68 kg/m²        Physical Exam   Constitutional: No distress.   Cardiovascular: Normal rate and regular rhythm.   No murmur heard.  Pulmonary/Chest: Effort normal and breath sounds normal. Right breast exhibits no inverted nipple, no mass, no nipple discharge, no skin change and no tenderness. Left breast exhibits inverted nipple, mass and tenderness. Left breast exhibits no nipple discharge and no skin change.   Chaperone Bing Sanchez MA.   LUOQ tender mass w/o erythema " or fluctuance     Musculoskeletal:        Left shoulder: She exhibits decreased range of motion ( pain with abduction and flexion). She exhibits no bony tenderness.   Lymphadenopathy:     She has no axillary adenopathy.   Psychiatric: Her mood appears anxious.   Vitals reviewed.           Angela was seen today for shoulder pain.    Diagnoses and all orders for this visit:    Breast mass  -     Mammo Diagnostic Digital Tomosynthesis Bilateral With CAD; Future  -     US Breast Left Complete; Future  New.  Further evaluation with mammogram and ultrasound.  Breast pain  -     Mammo Diagnostic Digital Tomosynthesis Bilateral With CAD; Future  -     US Breast Left Complete; Future  New.  Further evaluation with mammogram and ultrasound.  Essential hypertension  -     losartan (COZAAR) 25 MG tablet; Take 1 tablet by mouth Daily.  Improved with losartan.  Left arm pain  Differential diagnosis does include rotator cuff pathology.  Diagnostic priority includes her breast pain and breast mass.  Reevaluate shoulder pain after above tests.      Return in about 4 weeks (around 5/27/2019) for Follow-up.

## 2019-05-28 ENCOUNTER — APPOINTMENT (OUTPATIENT)
Dept: MAMMOGRAPHY | Facility: HOSPITAL | Age: 69
End: 2019-05-28

## 2019-05-31 ENCOUNTER — HOSPITAL ENCOUNTER (OUTPATIENT)
Dept: MAMMOGRAPHY | Facility: HOSPITAL | Age: 69
Discharge: HOME | End: 2019-05-31
Admitting: FAMILY MEDICINE

## 2019-05-31 ENCOUNTER — HOSPITAL ENCOUNTER (OUTPATIENT)
Dept: ULTRASOUND IMAGING | Facility: HOSPITAL | Age: 69
Discharge: HOME | End: 2019-05-31

## 2019-05-31 DIAGNOSIS — N64.4 BREAST PAIN: ICD-10-CM

## 2019-05-31 DIAGNOSIS — N63.0 BREAST MASS: ICD-10-CM

## 2019-05-31 PROCEDURE — 77066 DX MAMMO INCL CAD BI: CPT | Performed by: RADIOLOGY

## 2019-05-31 PROCEDURE — 76642 ULTRASOUND BREAST LIMITED: CPT | Performed by: RADIOLOGY

## 2019-05-31 PROCEDURE — G0279 TOMOSYNTHESIS, MAMMO: HCPCS

## 2019-05-31 PROCEDURE — G0279 TOMOSYNTHESIS, MAMMO: HCPCS | Performed by: RADIOLOGY

## 2019-05-31 PROCEDURE — 76642 ULTRASOUND BREAST LIMITED: CPT

## 2019-05-31 PROCEDURE — 77066 DX MAMMO INCL CAD BI: CPT

## 2019-06-26 ENCOUNTER — OFFICE VISIT (OUTPATIENT)
Dept: FAMILY MEDICINE CLINIC | Facility: CLINIC | Age: 69
End: 2019-06-26

## 2019-06-26 VITALS
DIASTOLIC BLOOD PRESSURE: 68 MMHG | WEIGHT: 121 LBS | SYSTOLIC BLOOD PRESSURE: 110 MMHG | BODY MASS INDEX: 22.26 KG/M2 | HEART RATE: 83 BPM | OXYGEN SATURATION: 98 % | HEIGHT: 62 IN

## 2019-06-26 DIAGNOSIS — M25.512 CHRONIC LEFT SHOULDER PAIN: Primary | ICD-10-CM

## 2019-06-26 DIAGNOSIS — G89.29 CHRONIC LEFT SHOULDER PAIN: Primary | ICD-10-CM

## 2019-06-26 DIAGNOSIS — K59.09 CHRONIC CONSTIPATION: ICD-10-CM

## 2019-06-26 DIAGNOSIS — G35 MS (MULTIPLE SCLEROSIS) (HCC): ICD-10-CM

## 2019-06-26 PROCEDURE — 96372 THER/PROPH/DIAG INJ SC/IM: CPT | Performed by: FAMILY MEDICINE

## 2019-06-26 PROCEDURE — 99214 OFFICE O/P EST MOD 30 MIN: CPT | Performed by: FAMILY MEDICINE

## 2019-06-26 RX ORDER — POLYETHYLENE GLYCOL 3350 17 G/17G
17 POWDER, FOR SOLUTION ORAL DAILY
Qty: 1 EACH | Refills: 11 | Status: SHIPPED | OUTPATIENT
Start: 2019-06-26

## 2019-06-26 RX ORDER — NAPROXEN 500 MG/1
500 TABLET ORAL 2 TIMES DAILY PRN
Qty: 60 TABLET | Refills: 5 | Status: SHIPPED | OUTPATIENT
Start: 2019-06-26 | End: 2020-01-21

## 2019-06-26 RX ORDER — KETOROLAC TROMETHAMINE 30 MG/ML
30 INJECTION, SOLUTION INTRAMUSCULAR; INTRAVENOUS ONCE
Status: COMPLETED | OUTPATIENT
Start: 2019-06-26 | End: 2019-06-26

## 2019-06-26 RX ADMIN — KETOROLAC TROMETHAMINE 30 MG: 30 INJECTION, SOLUTION INTRAMUSCULAR; INTRAVENOUS at 16:00

## 2019-06-26 NOTE — PROGRESS NOTES
Chief Complaint   Patient presents with   • Constipation     discuss rx for Miralax   • Shoulder Pain     left side, swollen and painful        Constipation   This is a chronic problem. The current episode started more than 1 year ago. The problem is unchanged. Her stool frequency is 2 to 3 times per week. Associated symptoms include abdominal pain. Risk factors: chronic narcotics. (Hemorrhoids)   Arm Pain    Incident onset: over a year. There was no injury mechanism. The pain is present in the left shoulder. Quality: dull. The pain is at a severity of 8/10. The pain has been constant since the incident. The symptoms are aggravated by lifting. She has tried acetaminophen (hydrocodone) for the symptoms. The treatment provided no relief.      Used to take Miralax. She was prescribed RELISTOR but made her have stomach pain. H/o impaction and she treated herself at home.     Worried her MS is progressing. No energy. More depressed because she doesn't feel good.     Review of Systems   Constitutional: Positive for fatigue.   Gastrointestinal: Positive for abdominal pain and constipation.   Musculoskeletal: Positive for arthralgias and joint swelling.   Psychiatric/Behavioral: Positive for depressed mood.        Current Outpatient Medications on File Prior to Visit   Medication Sig Dispense Refill   • ALPRAZolam (XANAX) 1 MG tablet Take 1 mg by mouth 3 (Three) Times a Day As Needed for Anxiety.     • HYDROcodone-acetaminophen (NORCO)  MG per tablet      • interferon beta-1A (REBIF) 44 MCG/0.5ML injection Inject  under the skin 3 (Three) Times a Week.     • losartan (COZAAR) 25 MG tablet Take 1 tablet by mouth Daily. 90 tablet 1   • QUEtiapine (SEROquel) 100 MG tablet      • tiZANidine (ZANAFLEX) 4 MG tablet Take 4 mg by mouth At Night As Needed for Muscle Spasms.     • [DISCONTINUED] RELISTOR 150 MG tablet        No current facility-administered medications on file prior to visit.        Allergies   Allergen  "Reactions   • Penicillins    • Lisinopril Rash and Cough       Past Medical History:   Diagnosis Date   • Acid reflux    • Arthritis    • Cataract    • CKD (chronic kidney disease) stage 2, GFR 60-89 ml/min 2018   • Colon polyp    • Depression    • GERD (gastroesophageal reflux disease)    • Headache    • Heart murmur    • History of blood transfusion 1971    after surgery    • Hypertension 2018   • Mild aortic regurgitation 01/03/2019   • MS (multiple sclerosis) (CMS/HCC)    • Pneumonia         Past Surgical History:   Procedure Laterality Date   • CATARACT EXTRACTION  2009   • CERVICAL BIOPSY  1971    benign   • HEMORRHOIDECTOMY  1971   • TUBAL ABDOMINAL LIGATION  1985        Family History   Problem Relation Age of Onset   • Thyroid disease Mother    • Hyperlipidemia Sister    • Thyroid disease Sister    • Other Sister         parathyroid disease   • Hypertension Brother    • Migraines Brother    • Other Brother         kidney stones   • Other Niece         parathyroid disease   • Breast cancer Neg Hx    • Ovarian cancer Neg Hx         Social History     Socioeconomic History   • Marital status:      Spouse name: Not on file   • Number of children: Not on file   • Years of education: Not on file   • Highest education level: Not on file   Tobacco Use   • Smoking status: Current Every Day Smoker     Packs/day: 0.50     Types: Cigarettes   • Smokeless tobacco: Never Used   • Tobacco comment: \"trying to quit\"   Substance and Sexual Activity   • Alcohol use: No   • Drug use: No   • Sexual activity: Defer        Visit Vitals  /68 (BP Location: Left arm, Patient Position: Sitting, Cuff Size: Adult)   Pulse 83   Ht 157.5 cm (62\")   Wt 54.9 kg (121 lb)   SpO2 98%   BMI 22.13 kg/m²        Physical Exam   Constitutional: No distress.   Cardiovascular: Normal rate and regular rhythm.   No murmur heard.  Pulmonary/Chest: Effort normal and breath sounds normal.   Musculoskeletal:        Left shoulder: She exhibits " decreased range of motion and bony tenderness. She exhibits no swelling, no crepitus, no deformity and normal strength.   Psychiatric: Her behavior is normal. Judgment and thought content normal.   Vitals reviewed.               Angela was seen today for constipation and shoulder pain.    Diagnoses and all orders for this visit:    Chronic left shoulder pain  -     ketorolac (TORADOL) injection 30 mg  -     naproxen (NAPROSYN) 500 MG tablet; Take 1 tablet by mouth 2 (Two) Times a Day As Needed for Mild Pain .  -     Ambulatory Referral to Physical Therapy Evaluate and treat  Likely rotator cuff tendinitis with normal strength less likely a tear.  Start NSAIDs.  Toradol given in the office today.  Additionally recommend starting physical therapy.  Of note patient is on chronic narcotic therapy but still experiencing pain.  If she is not improving after physical therapy will consider MRI.  Chronic constipation  -     polyethylene glycol (MIRALAX) powder; Take 17 g by mouth Daily.  Uncontrolled.  Start MiraLAX 1 capful daily.  MS (multiple sclerosis) (CMS/[facility])  She has upcoming appointment with neurology and will discuss her treatment then.      Return in about 6 weeks (around 8/7/2019) for Follow-up.

## 2019-08-05 ENCOUNTER — OFFICE VISIT (OUTPATIENT)
Dept: FAMILY MEDICINE CLINIC | Facility: CLINIC | Age: 69
End: 2019-08-05

## 2019-08-05 VITALS
OXYGEN SATURATION: 95 % | WEIGHT: 123.8 LBS | HEIGHT: 62 IN | SYSTOLIC BLOOD PRESSURE: 118 MMHG | HEART RATE: 70 BPM | BODY MASS INDEX: 22.78 KG/M2 | DIASTOLIC BLOOD PRESSURE: 78 MMHG

## 2019-08-05 DIAGNOSIS — M25.512 CHRONIC LEFT SHOULDER PAIN: Primary | ICD-10-CM

## 2019-08-05 DIAGNOSIS — K59.09 CHRONIC CONSTIPATION: ICD-10-CM

## 2019-08-05 DIAGNOSIS — G89.29 CHRONIC LEFT SHOULDER PAIN: Primary | ICD-10-CM

## 2019-08-05 PROCEDURE — 99213 OFFICE O/P EST LOW 20 MIN: CPT | Performed by: FAMILY MEDICINE

## 2019-08-05 NOTE — PROGRESS NOTES
Chief Complaint   Patient presents with   • Shoulder Injury     Left shoulder pain has gone away.   • Constipation     Chronic, much better.        Shoulder Injury    The left shoulder is affected. There was no injury mechanism. She has tried NSAIDs (analgesics) for the symptoms. The treatment provided significant relief.   Constipation   This is a chronic problem. The problem has been gradually improving since onset. Treatments tried: miralax. The treatment provided significant relief.     Doing her own physical therapy for her shoulder. Using resistance bands and light weights. Taking naproxen in the morning. No longer has pain in her shoulder.     Using 1 capful of Miralax in the morning with juice. Bowel movements improved.     She is trying to cut down on smoking and nicotine.     Review of Systems   Gastrointestinal: Positive for constipation.   Musculoskeletal: Negative for arthralgias.        Current Outpatient Medications on File Prior to Visit   Medication Sig Dispense Refill   • ALPRAZolam (XANAX) 1 MG tablet Take 1 mg by mouth 3 (Three) Times a Day As Needed for Anxiety.     • HYDROcodone-acetaminophen (NORCO)  MG per tablet      • interferon beta-1A (REBIF) 44 MCG/0.5ML injection Inject  under the skin 3 (Three) Times a Week.     • losartan (COZAAR) 25 MG tablet Take 1 tablet by mouth Daily. 90 tablet 1   • naproxen (NAPROSYN) 500 MG tablet Take 1 tablet by mouth 2 (Two) Times a Day As Needed for Mild Pain . 60 tablet 5   • polyethylene glycol (MIRALAX) powder Take 17 g by mouth Daily. 1 each 11   • QUEtiapine (SEROquel) 100 MG tablet      • tiZANidine (ZANAFLEX) 4 MG tablet Take 4 mg by mouth At Night As Needed for Muscle Spasms.       No current facility-administered medications on file prior to visit.        Allergies   Allergen Reactions   • Penicillins    • Lisinopril Rash and Cough       Past Medical History:   Diagnosis Date   • Acid reflux    • Arthritis    • Cataract    • CKD (chronic  "kidney disease) stage 2, GFR 60-89 ml/min 2018   • Colon polyp    • Depression    • GERD (gastroesophageal reflux disease)    • Headache    • Heart murmur    • History of blood transfusion 1971    after surgery    • Hypertension 2018   • Mild aortic regurgitation 01/03/2019   • MS (multiple sclerosis) (CMS/HCC)    • Pneumonia         Past Surgical History:   Procedure Laterality Date   • CATARACT EXTRACTION  2009   • CERVICAL BIOPSY  1971    benign   • HEMORRHOIDECTOMY  1971   • TUBAL ABDOMINAL LIGATION  1985        Family History   Problem Relation Age of Onset   • Thyroid disease Mother    • Hyperlipidemia Sister    • Thyroid disease Sister    • Other Sister         parathyroid disease   • Hypertension Brother    • Migraines Brother    • Other Brother         kidney stones   • Other Niece         parathyroid disease   • Breast cancer Neg Hx    • Ovarian cancer Neg Hx         Social History     Socioeconomic History   • Marital status:      Spouse name: Not on file   • Number of children: Not on file   • Years of education: Not on file   • Highest education level: Not on file   Tobacco Use   • Smoking status: Current Every Day Smoker     Packs/day: 0.50     Types: Cigarettes   • Smokeless tobacco: Never Used   • Tobacco comment: \"trying to quit\"   Substance and Sexual Activity   • Alcohol use: No   • Drug use: No   • Sexual activity: Defer        Visit Vitals  /78 (BP Location: Left arm, Patient Position: Sitting, Cuff Size: Adult)   Pulse 70   Ht 157.5 cm (62.01\")   Wt 56.2 kg (123 lb 12.8 oz)   SpO2 95%   BMI 22.64 kg/m²        Physical Exam   Constitutional: No distress.   Cardiovascular: Normal rate and regular rhythm.   No murmur heard.  Pulmonary/Chest: Effort normal and breath sounds normal.   Psychiatric: She has a normal mood and affect.   Vitals reviewed.           Angela was seen today for shoulder injury and constipation.    Diagnoses and all orders for this visit:    Chronic left shoulder " pain  Resolved. Naproxen as needed. Continue exercises.   Chronic constipation  Improved. Continue miralax.       Return in about 2 months (around 10/5/2019) for Medicare Wellness.

## 2019-08-12 ENCOUNTER — TELEPHONE (OUTPATIENT)
Dept: FAMILY MEDICINE CLINIC | Facility: CLINIC | Age: 69
End: 2019-08-12

## 2019-08-12 NOTE — TELEPHONE ENCOUNTER
Refill request for interferon beta-1A (REBIF) 44 MCG/0.5ML, ALPRAZolam (XANAX) 1 MG tablet. Pt states she has no supply on hand. Please send to [facility] on [address].        Pt states she is having withdrawals from being without her meds.

## 2019-08-12 NOTE — TELEPHONE ENCOUNTER
We have never given either medication - seems like these had come from Taty Montano and her neurology  Practice.  Previous EDS notes say she has an upcoming appt with neurology.      Called pt as number listed.  Taty will be in the  [facility] tomorrow and I can talk to her tomorrow.

## 2019-10-17 DIAGNOSIS — I10 ESSENTIAL HYPERTENSION: ICD-10-CM

## 2019-10-17 RX ORDER — LOSARTAN POTASSIUM 25 MG/1
TABLET ORAL
Qty: 90 TABLET | Refills: 0 | Status: SHIPPED | OUTPATIENT
Start: 2019-10-17 | End: 2019-11-09 | Stop reason: SDUPTHER

## 2019-11-09 ENCOUNTER — OFFICE VISIT (OUTPATIENT)
Dept: FAMILY MEDICINE CLINIC | Facility: CLINIC | Age: 69
End: 2019-11-09

## 2019-11-09 VITALS
HEART RATE: 62 BPM | DIASTOLIC BLOOD PRESSURE: 70 MMHG | TEMPERATURE: 97.7 F | OXYGEN SATURATION: 97 % | BODY MASS INDEX: 22.63 KG/M2 | HEIGHT: 62 IN | RESPIRATION RATE: 24 BRPM | WEIGHT: 123 LBS | SYSTOLIC BLOOD PRESSURE: 132 MMHG

## 2019-11-09 DIAGNOSIS — K63.5 POLYP OF COLON, UNSPECIFIED PART OF COLON, UNSPECIFIED TYPE: ICD-10-CM

## 2019-11-09 DIAGNOSIS — I10 ESSENTIAL HYPERTENSION: ICD-10-CM

## 2019-11-09 DIAGNOSIS — Z11.59 ENCOUNTER FOR SCREENING FOR OTHER VIRAL DISEASES: ICD-10-CM

## 2019-11-09 DIAGNOSIS — F41.1 GAD (GENERALIZED ANXIETY DISORDER): ICD-10-CM

## 2019-11-09 DIAGNOSIS — Z00.00 WELL ADULT EXAM: Primary | ICD-10-CM

## 2019-11-09 DIAGNOSIS — G35 MS (MULTIPLE SCLEROSIS) (HCC): ICD-10-CM

## 2019-11-09 PROCEDURE — G0439 PPPS, SUBSEQ VISIT: HCPCS | Performed by: FAMILY MEDICINE

## 2019-11-09 RX ORDER — LOSARTAN POTASSIUM 25 MG/1
25 TABLET ORAL DAILY
Qty: 90 TABLET | Refills: 1 | Status: SHIPPED | OUTPATIENT
Start: 2019-11-09 | End: 2020-02-28 | Stop reason: SDUPTHER

## 2019-11-09 NOTE — PROGRESS NOTES
"The ABCs of the Annual Wellness Visit  Subsequent Medicare Wellness Visit    Chief Complaint   Patient presents with   • Medicare Wellness-subsequent       Subjective   History of Present Illness:  Angela Diaz Parent is a 68 y.o. female who presents for a Subsequent Medicare Wellness Visit.    Home blood pressure 120/70.     A little anxious today. She gets panic attacks. She was referred to 3 psychiatrists.     She is followed by pain management, taking narcotics every 6 hours. She can't take oxycodone or percocet. Taking hydrocodone for awhile but thinks her body has an immunity to it. Pain in hands, gets distorted and has to straighten them out in horrific pain. Hands starts to get crooked. Had carpal tunnel and she has broken both wrists. Can't lay down or be still, can't sleep.     Memory is pretty good.         HEALTH RISK ASSESSMENT    Recent Hospitalizations:  No hospitalization(s) within the last year.    Current Medical Providers:  Patient Care Team:  Griselda Holden MD as PCP - General (Family Medicine)  Taty Montano DNP, APRN as PCP - Claims Attributed  Taty Montano DNP, APRN as Nurse Practitioner (Family Medicine)  Justin Cardoso MD as Consulting Physician (Ophthalmology)  Angi Rutledge MD (Gastroenterology)  Justin Parrish MD as Consulting Physician (Dermatology)  Derrick Harding II, MD (Pain Medicine)    Smoking Status:  Social History     Tobacco Use   Smoking Status Current Every Day Smoker   • Packs/day: 0.50   • Types: Cigarettes   Smokeless Tobacco Never Used   Tobacco Comment    \"trying to quit\"       Alcohol Consumption:  Social History     Substance and Sexual Activity   Alcohol Use No       Depression Screen:   PHQ-2/PHQ-9 Depression Screening 11/9/2019   Little interest or pleasure in doing things 0   Feeling down, depressed, or hopeless 0   Total Score 0       Fall Risk Screen:  STEADI Fall Risk Assessment was completed, and patient is at HIGH risk for " falls. Assessment completed on:11/9/2019    Health Habits and Functional and Cognitive Screening:  Functional & Cognitive Status 11/9/2019   Do you have difficulty preparing food and eating? No   Do you have difficulty bathing yourself, getting dressed or grooming yourself? No   Do you have difficulty using the toilet? No   Do you have difficulty moving around from place to place? No   Do you have trouble with steps or getting out of a bed or a chair? No   Current Diet Well Balanced Diet   Dental Exam Up to date   Eye Exam Up to date   Exercise (times per week) 0 times per week   Current Exercise Activities Include None   Do you need help using the phone?  No   Are you deaf or do you have serious difficulty hearing?  No   Do you need help with transportation? No   Do you need help shopping? No   Do you need help preparing meals?  No   Do you need help with housework?  No   Do you need help with laundry? No   Do you need help taking your medications? No   Do you need help managing money? No   Do you ever drive or ride in a car without wearing a seat belt? No   Have you felt unusual stress, anger or loneliness in the last month? No   Who do you live with? Alone   If you need help, do you have trouble finding someone available to you? No   Have you been bothered in the last four weeks by sexual problems? No   Do you have difficulty concentrating, remembering or making decisions? No         Does the patient have evidence of cognitive impairment? No    Asprin use counseling:Does not need ASA (and currently is not on it)    Age-appropriate Screening Schedule:  Refer to the list below for future screening recommendations based on patient's age, sex and/or medical conditions. Orders for these recommended tests are listed in the plan section. The patient has been provided with a written plan.    Health Maintenance   Topic Date Due   • TDAP/TD VACCINES (1 - Tdap) 11/16/1967   • ZOSTER VACCINE (1 of 2) 11/16/1998   •  PNEUMOCOCCAL VACCINES (65+ LOW/MEDIUM RISK) (1 of 2 - PCV13) 11/16/2013   • INFLUENZA VACCINE  08/01/2019   • MAMMOGRAM  05/31/2021   • COLONOSCOPY  02/06/2024          The following portions of the patient's history were reviewed and updated as appropriate: She  has a past medical history of Acid reflux, Anxiety, Arthritis, Cataract, CKD (chronic kidney disease) stage 2, GFR 60-89 ml/min (2018), Colon polyp, Depression, GERD (gastroesophageal reflux disease), Headache, Heart murmur, History of blood transfusion (1971), Hypertension (2018), Mild aortic regurgitation (01/03/2019), MS (multiple sclerosis) (CMS/[facility]), and Pneumonia.  She does not have any pertinent problems on file.  She  has a past surgical history that includes Cataract extraction (2009); Tubal ligation (1985); Hemorrhoid surgery (1971); and Cervical biopsy (1971).  Her family history includes Hyperlipidemia in her sister; Hypertension in her brother; Migraines in her brother; Other in her brother, niece, and sister; Thyroid disease in her mother and sister.  She  reports that she has been smoking cigarettes.  She has been smoking about 0.50 packs per day. She has never used smokeless tobacco. She reports that she does not drink alcohol or use drugs.  She is allergic to penicillins and lisinopril..    Outpatient Medications Prior to Visit   Medication Sig Dispense Refill   • ALPRAZolam (XANAX) 1 MG tablet Take 1 mg by mouth 3 (Three) Times a Day As Needed for Anxiety.     • HYDROcodone-acetaminophen (NORCO)  MG per tablet      • interferon beta-1A (REBIF) 44 MCG/0.5ML injection Inject  under the skin 3 (Three) Times a Week.     • naproxen (NAPROSYN) 500 MG tablet Take 1 tablet by mouth 2 (Two) Times a Day As Needed for Mild Pain . 60 tablet 5   • polyethylene glycol (MIRALAX) powder Take 17 g by mouth Daily. 1 each 11   • QUEtiapine (SEROquel) 100 MG tablet      • tiZANidine (ZANAFLEX) 4 MG tablet Take 4 mg by mouth At Night As Needed for Muscle  "Spasms.     • losartan (COZAAR) 25 MG tablet TAKE 1 TABLET BY MOUTH EVERY DAY 90 tablet 0     No facility-administered medications prior to visit.        Patient Active Problem List   Diagnosis   • Essential hypertension   • Palpitations   • Other forms of angina pectoris (CMS/HCC)    • CKD (chronic kidney disease) stage 2, GFR 60-89 ml/min   • MS (multiple sclerosis) (CMS/HCC)   • Mild aortic regurgitation   • Chronic constipation   • MINDI (generalized anxiety disorder)   • Polyp of colon       Advanced Care Planning:  Patient has an advance directive - a copy has not been provided. Have asked the patient to send this to us to add to record    Review of Systems   HENT: Negative for hearing loss.    Musculoskeletal: Positive for arthralgias.   Psychiatric/Behavioral: Negative for dysphoric mood and suicidal ideas. The patient is nervous/anxious.        Compared to one year ago, the patient feels her physical health is worse.  Compared to one year ago, the patient feels her mental health is the same.    Reviewed chart for potential of high risk medication in the elderly: yes  Reviewed chart for potential of harmful drug interactions in the elderly:yes  Patient is at high risk being prescribed benzodiazepines as well as opiates by outside providers.    Objective         Vitals:    11/09/19 1321   BP: 132/70   Pulse: 62   Resp: 24   Temp: 97.7 °F (36.5 °C)   TempSrc: Temporal   SpO2: 97%   Weight: 55.8 kg (123 lb)   Height: 157.5 cm (62\")       Body mass index is 22.5 kg/m².  Discussed the patient's BMI with her. The BMI is in the acceptable range.    Physical Exam   Constitutional: She is oriented to person, place, and time. No distress.   HENT:   Right Ear: Tympanic membrane and ear canal normal.   Left Ear: Tympanic membrane and ear canal normal.   Nose: Nose normal.   Mouth/Throat: Oropharynx is clear and moist and mucous membranes are normal. No oral lesions.   Eyes: Right eye exhibits no discharge. Left eye " exhibits no discharge.   Neck: Neck supple. No thyromegaly present.   Cardiovascular: Normal rate, regular rhythm and normal heart sounds.   No murmur heard.  Pulmonary/Chest: Effort normal and breath sounds normal.   Abdominal: Soft. Bowel sounds are normal. There is no tenderness.   Musculoskeletal: She exhibits no edema.   Lymphadenopathy:        Head (right side): No submandibular, no preauricular and no posterior auricular adenopathy present.        Head (left side): No submandibular, no preauricular and no posterior auricular adenopathy present.     She has no cervical adenopathy.   Neurological: She is alert and oriented to person, place, and time.   Skin: Skin is warm and dry.   Psychiatric: She has a normal mood and affect. Her behavior is normal. Judgment and thought content normal.             Assessment/Plan   Medicare Risks and Personalized Health Plan  CMS Preventative Services Quick Reference  Advance Directive Discussion  Breast Cancer/Mammogram Screening  Colon Cancer Screening  Immunizations Discussed/Encouraged (specific immunizations; Influenza )  Polypharmacy    Counseled on health maintenance topics and preventative care recommendations: Influenza vaccine, colon cancer screening, breast cancer screening    The above risks/problems have been discussed with the patient.  Pertinent information has been shared with the patient in the After Visit Summary.  Follow up plans and orders are seen below in the Assessment/Plan Section.    Diagnoses and all orders for this visit:    1. Well adult exam (Primary)  Previous mammogram 5/31/2019 BI-RADS 2.  2. Essential hypertension  -     Lipid Panel; Future  -     Comprehensive Metabolic Panel; Future  -     losartan (COZAAR) 25 MG tablet; Take 1 tablet by mouth Daily.  Dispense: 90 tablet; Refill: 1  Stable.  Continue losartan.  Return for fasting labs.  3. MINDI (generalized anxiety disorder)  -     Ambulatory Referral to Psychiatry  Patient had been  prescribed Xanax by her neurologist previously.  Eliu report reviewed showing Xanax 1 mg #90 dispensed on 10/12/2019.  Discussed with patient I would not prescribe benzodiazepines.  I have placed a referral to psychiatry.  Of note patient had called the office on August 12, 2019 for a Xanax refill even though I have never prescribed it for her and at that time she was informed I would not prescribe the Xanax.  Today she was also given the primary care referral number if she wants to switch primary care providers to find someone that would prescribe her Xanax.  4. MS (multiple sclerosis) (CMS/[facility])  -     Ambulatory Referral to Neurology  Patient is needing a new neurologist.  Referral placed.  5. Polyp of colon, unspecified part of colon, unspecified type  Reviewed records showing previous colonoscopy with polyps and recommended that she contact her doctor for follow-up colonoscopy.  6. Encounter for screening for other viral diseases  -     Hepatitis C Antibody; Future  Patient reports history of blood transfusion and with guidelines for baby boomers is interested in screening for hepatitis C.    Follow Up:  Return in about 6 months (around 5/9/2020) for Office visit, Medicare Wellness 1 year.     An After Visit Summary and PPPS were given to the patient.

## 2019-11-09 NOTE — PATIENT INSTRUCTIONS
Medicare Wellness  Personal Prevention Plan of Service     Date of Office Visit:  2019  Encounter Provider:  Griselda Guajardo MD  Place of Service:  [facility]  Patient Name: Angela Diaz Parent  :  1950    As part of the Medicare Wellness portion of your visit today, we are providing you with this personalized preventive plan of services (PPPS). This plan is based upon recommendations of the [place] Preventive Services Task Force (USPSTF) and the Advisory Committee on Immunization Practices (ACIP).    This lists the preventive care services that should be considered, and provides dates of when you are due. Items listed as completed are up-to-date and do not require any further intervention.    Health Maintenance   Topic Date Due   • TDAP/TD VACCINES (1 - Tdap) 1967   • ZOSTER VACCINE (1 of 2) 1998   • PNEUMOCOCCAL VACCINES (65+ LOW/MEDIUM RISK) (1 of 2 - PCV13) 2013   • HEPATITIS C SCREENING  12/10/2018   • INFLUENZA VACCINE  2019   • MEDICARE ANNUAL WELLNESS  2020   • MAMMOGRAM  2021   • COLONOSCOPY  2024       Orders Placed This Encounter   Procedures   • Lipid Panel     Standing Status:   Future     Standing Expiration Date:   2020   • Comprehensive Metabolic Panel     Standing Status:   Future     Standing Expiration Date:   2020   • Hepatitis C Antibody     Standing Status:   Future     Standing Expiration Date:   2020   • Ambulatory Referral to Neurology     Referral Priority:   Routine     Referral Type:   Consultation     Referral Reason:   Specialty Services Required     Referred to Provider:   Mehdi Petit MD     Requested Specialty:   Neurology     Number of Visits Requested:   1   • Ambulatory Referral to Psychiatry     Referral Priority:   Routine     Referral Type:   Behavorial Health/Psych     Referral Reason:   Specialty Services Required     Requested Specialty:   Psychiatry      Number of Visits Requested:   1       Return in about 6 months (around 5/9/2020) for Office visit, Medicare Wellness 1 year.

## 2019-11-14 ENCOUNTER — LAB (OUTPATIENT)
Dept: LAB | Facility: HOSPITAL | Age: 69
End: 2019-11-14

## 2019-11-14 DIAGNOSIS — I10 ESSENTIAL HYPERTENSION: ICD-10-CM

## 2019-11-14 DIAGNOSIS — Z11.59 ENCOUNTER FOR SCREENING FOR OTHER VIRAL DISEASES: ICD-10-CM

## 2019-11-14 LAB
ALBUMIN SERPL-MCNC: 4.6 G/DL (ref 3.5–5.2)
ALBUMIN/GLOB SERPL: 1.4 G/DL
ALP SERPL-CCNC: 68 U/L (ref 39–117)
ALT SERPL W P-5'-P-CCNC: 12 U/L (ref 1–33)
ANION GAP SERPL CALCULATED.3IONS-SCNC: 14.6 MMOL/L (ref 5–15)
AST SERPL-CCNC: 16 U/L (ref 1–32)
BILIRUB SERPL-MCNC: 0.2 MG/DL (ref 0.2–1.2)
BUN BLD-MCNC: 14 MG/DL (ref 8–23)
BUN/CREAT SERPL: 14.6 (ref 7–25)
CALCIUM SPEC-SCNC: 10.1 MG/DL (ref 8.6–10.5)
CHLORIDE SERPL-SCNC: 100 MMOL/L (ref 98–107)
CHOLEST SERPL-MCNC: 231 MG/DL (ref 0–200)
CO2 SERPL-SCNC: 27.4 MMOL/L (ref 22–29)
CREAT BLD-MCNC: 0.96 MG/DL (ref 0.57–1)
GFR SERPL CREATININE-BSD FRML MDRD: 57 ML/MIN/1.73
GLOBULIN UR ELPH-MCNC: 3.4 GM/DL
GLUCOSE BLD-MCNC: 105 MG/DL (ref 65–99)
HDLC SERPL-MCNC: 51 MG/DL (ref 40–60)
LDLC SERPL CALC-MCNC: 151 MG/DL (ref 0–100)
LDLC/HDLC SERPL: 2.96 {RATIO}
POTASSIUM BLD-SCNC: 4.1 MMOL/L (ref 3.5–5.2)
PROT SERPL-MCNC: 8 G/DL (ref 6–8.5)
SODIUM BLD-SCNC: 142 MMOL/L (ref 136–145)
TRIGL SERPL-MCNC: 146 MG/DL (ref 0–150)
VLDLC SERPL-MCNC: 29.2 MG/DL (ref 5–40)

## 2019-11-14 PROCEDURE — 86803 HEPATITIS C AB TEST: CPT

## 2019-11-14 PROCEDURE — 80053 COMPREHEN METABOLIC PANEL: CPT

## 2019-11-14 PROCEDURE — 80061 LIPID PANEL: CPT

## 2019-11-15 ENCOUNTER — TELEPHONE (OUTPATIENT)
Dept: FAMILY MEDICINE CLINIC | Facility: CLINIC | Age: 69
End: 2019-11-15

## 2019-11-15 LAB — HCV AB SER DONR QL: REACTIVE

## 2019-11-15 NOTE — TELEPHONE ENCOUNTER
Notes recorded by Michelle Ortiz MA on 11/15/2019 at 5:15 PM EST  Attempted to call patient no answer but will try again.  ------    ----- Message from Griselda Guajardo MD sent at 11/15/2019  4:55 PM EST -----  Please call patient to schedule an office visit to go over her abnormal lab results.  We will need to do follow-up blood testing at the visit as well but she does not need to be fasting.

## 2019-11-25 ENCOUNTER — OFFICE VISIT (OUTPATIENT)
Dept: FAMILY MEDICINE CLINIC | Facility: CLINIC | Age: 69
End: 2019-11-25

## 2019-11-25 ENCOUNTER — APPOINTMENT (OUTPATIENT)
Dept: LAB | Facility: HOSPITAL | Age: 69
End: 2019-11-25

## 2019-11-25 VITALS
HEIGHT: 62 IN | OXYGEN SATURATION: 92 % | DIASTOLIC BLOOD PRESSURE: 80 MMHG | WEIGHT: 120.4 LBS | BODY MASS INDEX: 22.16 KG/M2 | HEART RATE: 81 BPM | SYSTOLIC BLOOD PRESSURE: 118 MMHG

## 2019-11-25 DIAGNOSIS — G35 MS (MULTIPLE SCLEROSIS) (HCC): ICD-10-CM

## 2019-11-25 DIAGNOSIS — R76.8 HEPATITIS C ANTIBODY TEST POSITIVE: Primary | ICD-10-CM

## 2019-11-25 DIAGNOSIS — R73.01 ELEVATED FASTING GLUCOSE: ICD-10-CM

## 2019-11-25 DIAGNOSIS — F41.1 GAD (GENERALIZED ANXIETY DISORDER): ICD-10-CM

## 2019-11-25 DIAGNOSIS — J06.9 ACUTE URI: ICD-10-CM

## 2019-11-25 LAB
ALBUMIN SERPL-MCNC: 4.6 G/DL (ref 3.5–5.2)
ALBUMIN/GLOB SERPL: 1.7 G/DL
ALP SERPL-CCNC: 69 U/L (ref 39–117)
ALT SERPL W P-5'-P-CCNC: 9 U/L (ref 1–33)
ANION GAP SERPL CALCULATED.3IONS-SCNC: 13.6 MMOL/L (ref 5–15)
AST SERPL-CCNC: 15 U/L (ref 1–32)
BILIRUB SERPL-MCNC: 0.2 MG/DL (ref 0.2–1.2)
BUN BLD-MCNC: 17 MG/DL (ref 8–23)
BUN/CREAT SERPL: 19.8 (ref 7–25)
CALCIUM SPEC-SCNC: 9.7 MG/DL (ref 8.6–10.5)
CHLORIDE SERPL-SCNC: 100 MMOL/L (ref 98–107)
CO2 SERPL-SCNC: 25.4 MMOL/L (ref 22–29)
CREAT BLD-MCNC: 0.86 MG/DL (ref 0.57–1)
GFR SERPL CREATININE-BSD FRML MDRD: 65 ML/MIN/1.73
GLOBULIN UR ELPH-MCNC: 2.7 GM/DL
GLUCOSE BLD-MCNC: 98 MG/DL (ref 65–99)
POTASSIUM BLD-SCNC: 4.6 MMOL/L (ref 3.5–5.2)
PROT SERPL-MCNC: 7.3 G/DL (ref 6–8.5)
SODIUM BLD-SCNC: 139 MMOL/L (ref 136–145)

## 2019-11-25 PROCEDURE — 36415 COLL VENOUS BLD VENIPUNCTURE: CPT | Performed by: FAMILY MEDICINE

## 2019-11-25 PROCEDURE — 80053 COMPREHEN METABOLIC PANEL: CPT | Performed by: FAMILY MEDICINE

## 2019-11-25 PROCEDURE — 99214 OFFICE O/P EST MOD 30 MIN: CPT | Performed by: FAMILY MEDICINE

## 2019-11-25 PROCEDURE — 87522 HEPATITIS C REVRS TRNSCRPJ: CPT | Performed by: FAMILY MEDICINE

## 2019-11-25 NOTE — PROGRESS NOTES
Chief Complaint   Patient presents with   • Labs Only     positive hep c        HPI   Hepatitis C:  She is freaked out. She had a previous blood transfusion in 1970s. She was worried about interferon causing liver damage.     MS:  She has appt with new neurology next month. Her hands draw up and hurt so bad, pain medication and muscle medication for spasms are worthless.     Anxiety:  She will never go back to counseling, it was worthless. Seeing new psych next month. She is no longer taking xanax.     URI:  Sick contact with son. Two nights ago itchy and sore throat. Tonsils feel swollen. Bilateral ear itching. H/o bronchitis and pneumonia. Little bit of cough. Feeling warm.         Review of Systems   Constitutional: Negative for fever.   HENT: Positive for sore throat.    Respiratory: Positive for cough.    Musculoskeletal: Positive for arthralgias.   Psychiatric/Behavioral: The patient is nervous/anxious.         Current Outpatient Medications   Medication Sig Dispense Refill   • HYDROcodone-acetaminophen (NORCO)  MG per tablet      • losartan (COZAAR) 25 MG tablet Take 1 tablet by mouth Daily. 90 tablet 1   • naproxen (NAPROSYN) 500 MG tablet Take 1 tablet by mouth 2 (Two) Times a Day As Needed for Mild Pain . 60 tablet 5   • polyethylene glycol (MIRALAX) powder Take 17 g by mouth Daily. 1 each 11   • QUEtiapine (SEROquel) 100 MG tablet      • tiZANidine (ZANAFLEX) 4 MG tablet Take 4 mg by mouth At Night As Needed for Muscle Spasms.       No current facility-administered medications for this visit.        Allergies   Allergen Reactions   • Penicillins    • Lisinopril Rash and Cough       Patient Active Problem List   Diagnosis   • Essential hypertension   • Palpitations   • Other forms of angina pectoris (CMS/HCC)    • CKD (chronic kidney disease) stage 2, GFR 60-89 ml/min   • MS (multiple sclerosis) (CMS/HCC)   • Mild aortic regurgitation   • Chronic constipation   • MINDI (generalized anxiety disorder)  "  • Polyp of colon   • Hepatitis C antibody test positive       Social History     Social History Narrative   • Not on file       Visit Vitals  /80 (BP Location: Left arm, Patient Position: Sitting, Cuff Size: Adult)   Pulse 81   Ht 157.5 cm (62\")   Wt 54.6 kg (120 lb 6.4 oz)   SpO2 92%   BMI 22.02 kg/m²        Physical Exam   Constitutional: No distress.   HENT:   Mouth/Throat: No oral lesions. Oropharyngeal exudate ( clear PND) present. No posterior oropharyngeal edema or posterior oropharyngeal erythema.   Cardiovascular: Normal rate and regular rhythm.   No murmur heard.  Pulmonary/Chest: Effort normal and breath sounds normal.   Abdominal: Soft. There is no hepatosplenomegaly. There is no tenderness.   Lymphadenopathy:        Head (right side): No submandibular, no preauricular and no posterior auricular adenopathy present.        Head (left side): No submandibular, no preauricular and no posterior auricular adenopathy present.     She has no cervical adenopathy.   Psychiatric: She exhibits a depressed mood.   Vitals reviewed.      Results for orders placed or performed in visit on 11/14/19   Lipid Panel   Result Value Ref Range    Total Cholesterol 231 (H) 0 - 200 mg/dL    Triglycerides 146 0 - 150 mg/dL    HDL Cholesterol 51 40 - 60 mg/dL    LDL Cholesterol  151 (H) 0 - 100 mg/dL    VLDL Cholesterol 29.2 5 - 40 mg/dL    LDL/HDL Ratio 2.96    Comprehensive Metabolic Panel   Result Value Ref Range    Glucose 105 (H) 65 - 99 mg/dL    BUN 14 8 - 23 mg/dL    Creatinine 0.96 0.57 - 1.00 mg/dL    Sodium 142 136 - 145 mmol/L    Potassium 4.1 3.5 - 5.2 mmol/L    Chloride 100 98 - 107 mmol/L    CO2 27.4 22.0 - 29.0 mmol/L    Calcium 10.1 8.6 - 10.5 mg/dL    Total Protein 8.0 6.0 - 8.5 g/dL    Albumin 4.60 3.50 - 5.20 g/dL    ALT (SGPT) 12 1 - 33 U/L    AST (SGOT) 16 1 - 32 U/L    Alkaline Phosphatase 68 39 - 117 U/L    Total Bilirubin 0.2 0.2 - 1.2 mg/dL    eGFR Non African Amer 57 (L) >60 mL/min/1.73    Globulin " 3.4 gm/dL    A/G Ratio 1.4 g/dL    BUN/Creatinine Ratio 14.6 7.0 - 25.0    Anion Gap 14.6 5.0 - 15.0 mmol/L   Hepatitis C Antibody   Result Value Ref Range    Hepatitis C Ab Reactive (A) Non-Reactive        Angela was seen today for labs only.    Diagnoses and all orders for this visit:    Hepatitis C antibody test positive  -     Hepatitis C RNA, Quantitative, PCR (graph); Future  -     Hepatitis C RNA, Quantitative, PCR (graph)  New. Further evaluation with labs today. If viral load present will refer for treatment options. Normal   Elevated fasting glucose  -     Comprehensive Metabolic Panel; Future  -     Comprehensive Metabolic Panel  Recheck labs.  Acute URI  New.  Likely viral.  Recommend symptomatic care.  No signs of bronchitis or pneumonia on exam needing antibiotics.  MINDI (generalized anxiety disorder)  Patient has been to several psych appts. She was informed previously that I would not prescribe benzodiazepines.   MS (multiple sclerosis) (CMS/[facility])  Patient is no longer taking rebif. She has an appt with new neurologist next month.       Return in about 6 months (around 5/25/2020) for Office visit.    Dr. Griselda Guajardo

## 2019-11-27 LAB
HCV RNA SERPL NAA+PROBE-ACNC: NORMAL IU/ML
TEST INFORMATION: NORMAL

## 2019-12-12 ENCOUNTER — OFFICE VISIT (OUTPATIENT)
Dept: NEUROLOGY | Facility: CLINIC | Age: 69
End: 2019-12-12

## 2019-12-12 ENCOUNTER — LAB (OUTPATIENT)
Dept: LAB | Facility: HOSPITAL | Age: 69
End: 2019-12-12

## 2019-12-12 VITALS
SYSTOLIC BLOOD PRESSURE: 119 MMHG | WEIGHT: 116 LBS | DIASTOLIC BLOOD PRESSURE: 76 MMHG | HEART RATE: 105 BPM | OXYGEN SATURATION: 95 % | HEIGHT: 62 IN | BODY MASS INDEX: 21.35 KG/M2

## 2019-12-12 DIAGNOSIS — G35 MS (MULTIPLE SCLEROSIS) (HCC): ICD-10-CM

## 2019-12-12 DIAGNOSIS — R73.01 ELEVATED FASTING GLUCOSE: ICD-10-CM

## 2019-12-12 DIAGNOSIS — G35 MS (MULTIPLE SCLEROSIS) (HCC): Primary | ICD-10-CM

## 2019-12-12 DIAGNOSIS — M79.2 NEUROPATHIC PAIN: ICD-10-CM

## 2019-12-12 DIAGNOSIS — G35 MULTIPLE SCLEROSIS (HCC): Primary | ICD-10-CM

## 2019-12-12 LAB
ALBUMIN SERPL-MCNC: 4.8 G/DL (ref 3.5–5.2)
ALBUMIN/GLOB SERPL: 1.7 G/DL
ALP SERPL-CCNC: 66 U/L (ref 39–117)
ALT SERPL W P-5'-P-CCNC: <5 U/L (ref 1–33)
ANION GAP SERPL CALCULATED.3IONS-SCNC: 11.6 MMOL/L (ref 5–15)
AST SERPL-CCNC: 18 U/L (ref 1–32)
BILIRUB SERPL-MCNC: 0.3 MG/DL (ref 0.2–1.2)
BUN BLD-MCNC: 13 MG/DL (ref 8–23)
BUN/CREAT SERPL: 16.5 (ref 7–25)
CALCIUM SPEC-SCNC: 10.4 MG/DL (ref 8.6–10.5)
CHLORIDE SERPL-SCNC: 104 MMOL/L (ref 98–107)
CO2 SERPL-SCNC: 25.4 MMOL/L (ref 22–29)
CREAT BLD-MCNC: 0.79 MG/DL (ref 0.57–1)
GFR SERPL CREATININE-BSD FRML MDRD: 72 ML/MIN/1.73
GLOBULIN UR ELPH-MCNC: 2.9 GM/DL
GLUCOSE BLD-MCNC: 105 MG/DL (ref 65–99)
HBA1C MFR BLD: 6.01 % (ref 4.8–5.6)
POTASSIUM BLD-SCNC: 4.4 MMOL/L (ref 3.5–5.2)
PROT SERPL-MCNC: 7.7 G/DL (ref 6–8.5)
SODIUM BLD-SCNC: 141 MMOL/L (ref 136–145)

## 2019-12-12 PROCEDURE — 86787 VARICELLA-ZOSTER ANTIBODY: CPT

## 2019-12-12 PROCEDURE — 85025 COMPLETE CBC W/AUTO DIFF WBC: CPT

## 2019-12-12 PROCEDURE — 86480 TB TEST CELL IMMUN MEASURE: CPT

## 2019-12-12 PROCEDURE — 99204 OFFICE O/P NEW MOD 45 MIN: CPT | Performed by: PSYCHIATRY & NEUROLOGY

## 2019-12-12 PROCEDURE — 80053 COMPREHEN METABOLIC PANEL: CPT

## 2019-12-12 PROCEDURE — 83036 HEMOGLOBIN GLYCOSYLATED A1C: CPT

## 2019-12-12 PROCEDURE — 36415 COLL VENOUS BLD VENIPUNCTURE: CPT

## 2019-12-12 RX ORDER — OXCARBAZEPINE 300 MG/1
300 TABLET, FILM COATED ORAL 2 TIMES DAILY
Qty: 60 TABLET | Refills: 6 | Status: SHIPPED | OUTPATIENT
Start: 2019-12-12 | End: 2020-08-10

## 2019-12-12 RX ORDER — OXCARBAZEPINE 300 MG/1
300 TABLET, FILM COATED ORAL 2 TIMES DAILY
Qty: 60 TABLET | Refills: 6 | Status: SHIPPED | OUTPATIENT
Start: 2019-12-12 | End: 2019-12-12 | Stop reason: SDUPTHER

## 2019-12-12 NOTE — PROGRESS NOTES
Subjective   Patient ID: Angela Diaz Parent is a 69 y.o. female     Chief Complaint   Patient presents with   • Multiple Sclerosis        History of Present Illness    69 y.o. female referred by Dr Griselda Ashby for RRMS.  Dx in 1997 previously followed by Dr Jensen and Taty Montano.   Treated with Rebif two different times for 5 years and off for a year then back on for 7 - 9 years.  Stopped Rebif two months ago.      Last MRI Brain 18 months ago.  Anxiety for small spaces.      Fatigue is severe.      Pain in heads with sharp shooting episodes.  Sits on head and puts them in hot water.  Movement worsens pain.  Sharp shooting pains down shins once a week.      Gait unsteady.      Attention and concentration decreasing.      Reviewed medical records:    Followed by pain management treated with narcotics.  Complains of pain in hands.  Taking Xanax for anxiety referred to Psychiatry.      Hep C ab reactive; quantitation not detected.     Past Medical History:   Diagnosis Date   • Acid reflux    • Anxiety    • Arthritis    • Cataract    • CKD (chronic kidney disease) stage 2, GFR 60-89 ml/min 2018   • Colon polyp    • Depression    • GERD (gastroesophageal reflux disease)    • Headache    • Heart murmur    • History of blood transfusion 1971    after surgery    • Hypertension 2018   • Mild aortic regurgitation 01/03/2019   • MS (multiple sclerosis) (CMS/HCC)    • Pneumonia      Family History   Problem Relation Age of Onset   • Thyroid disease Mother    • Hyperlipidemia Sister    • Thyroid disease Sister    • Other Sister         parathyroid disease   • Hypertension Brother    • Migraines Brother    • Other Brother         kidney stones   • Other Niece         parathyroid disease   • Breast cancer Neg Hx    • Ovarian cancer Neg Hx      Social History     Socioeconomic History   • Marital status:      Spouse name: Not on file   • Number of children: Not on file   • Years of education: Not on file   •  "Highest education level: Not on file   Tobacco Use   • Smoking status: Current Every Day Smoker     Packs/day: 0.50     Types: Cigarettes   • Smokeless tobacco: Never Used   • Tobacco comment: \"trying to quit\"   Substance and Sexual Activity   • Alcohol use: No   • Drug use: No   • Sexual activity: Defer       Review of Systems   Constitutional: Positive for fatigue. Negative for activity change and unexpected weight change.   HENT: Negative for tinnitus and trouble swallowing.    Eyes: Negative for photophobia and visual disturbance.   Respiratory: Negative for apnea, cough and choking.    Cardiovascular: Negative for leg swelling.   Gastrointestinal: Negative for nausea and vomiting.   Endocrine: Negative for cold intolerance and heat intolerance.   Genitourinary: Negative for difficulty urinating, frequency, menstrual problem and urgency.   Musculoskeletal: Positive for arthralgias. Negative for back pain, gait problem, myalgias and neck pain.   Skin: Negative for color change and rash.   Allergic/Immunologic: Negative for immunocompromised state.   Neurological: Positive for weakness, numbness and headaches. Negative for dizziness, tremors, seizures, syncope, facial asymmetry, speech difficulty and light-headedness.   Hematological: Negative for adenopathy. Does not bruise/bleed easily.   Psychiatric/Behavioral: Positive for dysphoric mood. Negative for behavioral problems, confusion, decreased concentration, hallucinations and sleep disturbance. The patient is nervous/anxious.        Objective     Vitals:    12/12/19 1305   BP: 119/76   Pulse: 105   SpO2: 95%   Weight: 52.6 kg (116 lb)   Height: 157.5 cm (62\")       Neurologic Exam     Mental Status   Oriented to person, place, and time.   Registration: recalls 3 of 3 objects. Recall at 5 minutes: recalls 3 of 3 objects. Follows 3 step commands.   Attention: normal. Concentration: normal.   Speech: speech is normal   Level of consciousness: alert  Knowledge: " good and consistent with education.   Able to name object. Able to read. Able to repeat. Able to write. Normal comprehension.     Cranial Nerves     CN II   Visual fields full to confrontation.   Visual acuity: normal  Right visual field deficit: none  Left visual field deficit: none     CN III, IV, VI   Pupils are equal, round, and reactive to light.  Extraocular motions are normal.   Right pupil: Shape: regular. Reactivity: brisk. Consensual response: intact.   Left pupil: Shape: regular. Reactivity: brisk. Consensual response: intact.   Nystagmus: none   Diplopia: none  Ophthalmoparesis: none  Upgaze: normal  Downgaze: normal  Conjugate gaze: present  Vestibulo-ocular reflex: present    CN V   Facial sensation intact.   Right corneal reflex: normal  Left corneal reflex: normal    CN VII   Right facial weakness: none  Left facial weakness: none    CN VIII   Hearing: intact    CN IX, X   Palate: symmetric  Right gag reflex: normal  Left gag reflex: normal    CN XI   Right sternocleidomastoid strength: normal  Left sternocleidomastoid strength: normal    CN XII   Tongue: not atrophic  Fasciculations: absent  Tongue deviation: none    Motor Exam   Muscle bulk: normal  Overall muscle tone: normal  Right arm tone: normal  Left arm tone: normal  Right leg tone: normal  Left leg tone: normal    Strength   Strength 5/5 throughout.     Sensory Exam   Light touch normal.   Vibration normal.   Proprioception normal.   Pinprick normal.     Gait, Coordination, and Reflexes     Gait  Gait: non-neurologic and shuffling    Coordination   Romberg: negative  Finger to nose coordination: normal  Heel to shin coordination: normal  Tandem walking coordination: normal    Tremor   Resting tremor: absent  Intention tremor: absent  Action tremor: absent    Reflexes   Reflexes 2+ except as noted.       Physical Exam   Constitutional: She is oriented to person, place, and time. Vital signs are normal. She appears well-developed and  well-nourished.   HENT:   Head: Normocephalic and atraumatic.   Eyes: Pupils are equal, round, and reactive to light. EOM and lids are normal.   Fundoscopic exam:       The right eye shows no exudate, no hemorrhage and no papilledema. The right eye shows venous pulsations.        The left eye shows no exudate, no hemorrhage and no papilledema. The left eye shows venous pulsations.   Neck: Normal range of motion and phonation normal. Normal carotid pulses present. Carotid bruit is not present. No thyroid mass and no thyromegaly present.   Cardiovascular: Normal rate, regular rhythm and normal heart sounds.   Pulmonary/Chest: Effort normal.   Neurological: She is oriented to person, place, and time. She has normal strength. She has a normal Finger-Nose-Finger Test, a normal Heel to Shin Test, a normal Romberg Test and a normal Tandem Gait Test.   Skin: Skin is warm and dry.   Psychiatric: She has a normal mood and affect. Her speech is normal.   Nursing note and vitals reviewed.      Office Visit on 11/25/2019   Component Date Value Ref Range Status   • Hepatitis C Quantitation 11/25/2019 HCV Not Detected  IU/mL Final   • Test Information 11/25/2019 Comment   Final    The quantitative range of this assay is 15 IU/mL to 100 million IU/mL.   • Glucose 11/25/2019 98  65 - 99 mg/dL Final   • BUN 11/25/2019 17  8 - 23 mg/dL Final   • Creatinine 11/25/2019 0.86  0.57 - 1.00 mg/dL Final   • Sodium 11/25/2019 139  136 - 145 mmol/L Final   • Potassium 11/25/2019 4.6  3.5 - 5.2 mmol/L Final   • Chloride 11/25/2019 100  98 - 107 mmol/L Final   • CO2 11/25/2019 25.4  22.0 - 29.0 mmol/L Final   • Calcium 11/25/2019 9.7  8.6 - 10.5 mg/dL Final   • Total Protein 11/25/2019 7.3  6.0 - 8.5 g/dL Final   • Albumin 11/25/2019 4.60  3.50 - 5.20 g/dL Final   • ALT (SGPT) 11/25/2019 9  1 - 33 U/L Final   • AST (SGOT) 11/25/2019 15  1 - 32 U/L Final   • Alkaline Phosphatase 11/25/2019 69  39 - 117 U/L Final   • Total Bilirubin 11/25/2019 0.2   0.2 - 1.2 mg/dL Final   • eGFR Non  Amer 11/25/2019 65  >60 mL/min/1.73 Final   • Globulin 11/25/2019 2.7  gm/dL Final   • A/G Ratio 11/25/2019 1.7  g/dL Final   • BUN/Creatinine Ratio 11/25/2019 19.8  7.0 - 25.0 Final   • Anion Gap 11/25/2019 13.6  5.0 - 15.0 mmol/L Final         Assessment/Plan     Problem List Items Addressed This Visit        Nervous and Auditory    MS (multiple sclerosis) (CMS/HCC) - Primary    Current Assessment & Plan     Existing diagnosis of MS off DMT    Pt will obtain MRI discs to review    Labs            Relevant Orders    CBC & Differential    Comprehensive Metabolic Panel    QuantiFERON TB Plus Client Incubated    Varicella Zoster Antibody, IgG    Neuropathic pain    Current Assessment & Plan     Start  mg BID                   No follow-ups on file.

## 2019-12-13 PROBLEM — R73.03 PREDIABETES: Status: ACTIVE | Noted: 2019-12-12

## 2019-12-13 LAB
BASOPHILS # BLD AUTO: 0.03 10*3/MM3 (ref 0–0.2)
BASOPHILS NFR BLD AUTO: 0.3 % (ref 0–1.5)
DEPRECATED RDW RBC AUTO: 42.6 FL (ref 37–54)
EOSINOPHIL # BLD AUTO: 0.16 10*3/MM3 (ref 0–0.4)
EOSINOPHIL NFR BLD AUTO: 1.8 % (ref 0.3–6.2)
ERYTHROCYTE [DISTWIDTH] IN BLOOD BY AUTOMATED COUNT: 12.3 % (ref 12.3–15.4)
HCT VFR BLD AUTO: 39.9 % (ref 34–46.6)
HGB BLD-MCNC: 13.3 G/DL (ref 12–15.9)
IMM GRANULOCYTES # BLD AUTO: 0.04 10*3/MM3 (ref 0–0.05)
IMM GRANULOCYTES NFR BLD AUTO: 0.4 % (ref 0–0.5)
LYMPHOCYTES # BLD AUTO: 3.03 10*3/MM3 (ref 0.7–3.1)
LYMPHOCYTES NFR BLD AUTO: 33.5 % (ref 19.6–45.3)
MCH RBC QN AUTO: 31.8 PG (ref 26.6–33)
MCHC RBC AUTO-ENTMCNC: 33.3 G/DL (ref 31.5–35.7)
MCV RBC AUTO: 95.5 FL (ref 79–97)
MONOCYTES # BLD AUTO: 0.68 10*3/MM3 (ref 0.1–0.9)
MONOCYTES NFR BLD AUTO: 7.5 % (ref 5–12)
NEUTROPHILS # BLD AUTO: 5.1 10*3/MM3 (ref 1.7–7)
NEUTROPHILS NFR BLD AUTO: 56.5 % (ref 42.7–76)
NRBC BLD AUTO-RTO: 0 /100 WBC (ref 0–0.2)
PLATELET # BLD AUTO: 389 10*3/MM3 (ref 140–450)
PMV BLD AUTO: 10.6 FL (ref 6–12)
RBC # BLD AUTO: 4.18 10*6/MM3 (ref 3.77–5.28)
VZV IGG SER IA-ACNC: POSITIVE
WBC NRBC COR # BLD: 9.04 10*3/MM3 (ref 3.4–10.8)

## 2019-12-15 LAB
QUANTIFERON CRITERIA: NORMAL
QUANTIFERON MITOGEN VALUE: >10 IU/ML
QUANTIFERON NIL VALUE: 0.07 IU/ML
QUANTIFERON TB1 AG VALUE: 0.07 IU/ML
QUANTIFERON TB2 AG VALUE: 0.08 IU/ML
QUANTIFERON-TB GOLD PLUS: NEGATIVE

## 2019-12-27 ENCOUNTER — APPOINTMENT (OUTPATIENT)
Dept: GENERAL RADIOLOGY | Facility: HOSPITAL | Age: 69
End: 2019-12-27

## 2019-12-27 ENCOUNTER — HOSPITAL ENCOUNTER (INPATIENT)
Facility: HOSPITAL | Age: 69
LOS: 4 days | Discharge: NURSING, REHAB OR LONG-TERM CARE | End: 2020-01-01
Attending: EMERGENCY MEDICINE | Admitting: INTERNAL MEDICINE

## 2019-12-27 DIAGNOSIS — S82.852A CLOSED TRIMALLEOLAR FRACTURE OF LEFT ANKLE, INITIAL ENCOUNTER: Primary | ICD-10-CM
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
## 2019-12-27 DIAGNOSIS — Z78.9 IMPAIRED MOBILITY AND ADLS: ICD-10-CM

## 2019-12-27 DIAGNOSIS — Z74.09 IMPAIRED MOBILITY AND ADLS: ICD-10-CM

## 2019-12-27 PROCEDURE — 99285 EMERGENCY DEPT VISIT HI MDM: CPT

## 2019-12-27 PROCEDURE — 73610 X-RAY EXAM OF ANKLE: CPT

## 2019-12-28 ENCOUNTER — APPOINTMENT (OUTPATIENT)
Dept: GENERAL RADIOLOGY | Facility: HOSPITAL | Age: 69
End: 2019-12-28

## 2019-12-28 ENCOUNTER — ANESTHESIA EVENT (OUTPATIENT)
Dept: PERIOP | Facility: HOSPITAL | Age: 69
End: 2019-12-28

## 2019-12-28 ENCOUNTER — ANESTHESIA (OUTPATIENT)
Dept: PERIOP | Facility: HOSPITAL | Age: 69
End: 2019-12-28

## 2019-12-28 PROBLEM — S82.852A: Status: ACTIVE | Noted: 2019-12-28

## 2019-12-28 PROBLEM — K21.9 GERD WITHOUT ESOPHAGITIS: Status: ACTIVE | Noted: 2019-12-28

## 2019-12-28 PROBLEM — R73.9 HYPERGLYCEMIA: Status: ACTIVE | Noted: 2019-12-28

## 2019-12-28 LAB
ALBUMIN SERPL-MCNC: 4 G/DL (ref 3.5–5.2)
ALBUMIN/GLOB SERPL: 1.7 G/DL
ALP SERPL-CCNC: 64 U/L (ref 39–117)
ALT SERPL W P-5'-P-CCNC: 9 U/L (ref 1–33)
ANION GAP SERPL CALCULATED.3IONS-SCNC: 12 MMOL/L (ref 5–15)
AST SERPL-CCNC: 15 U/L (ref 1–32)
BACTERIA UR QL AUTO: ABNORMAL /HPF
BASOPHILS # BLD AUTO: 0.03 10*3/MM3 (ref 0–0.2)
BASOPHILS NFR BLD AUTO: 0.4 % (ref 0–1.5)
BILIRUB SERPL-MCNC: <0.2 MG/DL (ref 0.2–1.2)
BILIRUB UR QL STRIP: NEGATIVE
BUN BLD-MCNC: 15 MG/DL (ref 8–23)
BUN/CREAT SERPL: 16.9 (ref 7–25)
CALCIUM SPEC-SCNC: 8.8 MG/DL (ref 8.6–10.5)
CHLORIDE SERPL-SCNC: 93 MMOL/L (ref 98–107)
CLARITY UR: CLEAR
CO2 SERPL-SCNC: 26 MMOL/L (ref 22–29)
COLOR UR: YELLOW
CREAT BLD-MCNC: 0.89 MG/DL (ref 0.57–1)
DEPRECATED RDW RBC AUTO: 46.2 FL (ref 37–54)
EOSINOPHIL # BLD AUTO: 0.12 10*3/MM3 (ref 0–0.4)
EOSINOPHIL NFR BLD AUTO: 1.6 % (ref 0.3–6.2)
ERYTHROCYTE [DISTWIDTH] IN BLOOD BY AUTOMATED COUNT: 13 % (ref 12.3–15.4)
GFR SERPL CREATININE-BSD FRML MDRD: 63 ML/MIN/1.73
GLOBULIN UR ELPH-MCNC: 2.4 GM/DL
GLUCOSE BLD-MCNC: 251 MG/DL (ref 65–99)
GLUCOSE UR STRIP-MCNC: NEGATIVE MG/DL
HCT VFR BLD AUTO: 34.5 % (ref 34–46.6)
HGB BLD-MCNC: 11.5 G/DL (ref 12–15.9)
HGB UR QL STRIP.AUTO: NEGATIVE
HOLD SPECIMEN: NORMAL
HOLD SPECIMEN: NORMAL
HYALINE CASTS UR QL AUTO: ABNORMAL /LPF
IMM GRANULOCYTES # BLD AUTO: 0.06 10*3/MM3 (ref 0–0.05)
IMM GRANULOCYTES NFR BLD AUTO: 0.8 % (ref 0–0.5)
INR PPP: 1.05 (ref 0.85–1.16)
KETONES UR QL STRIP: NEGATIVE
LEUKOCYTE ESTERASE UR QL STRIP.AUTO: ABNORMAL
LYMPHOCYTES # BLD AUTO: 1.81 10*3/MM3 (ref 0.7–3.1)
LYMPHOCYTES NFR BLD AUTO: 23.5 % (ref 19.6–45.3)
MCH RBC QN AUTO: 32.8 PG (ref 26.6–33)
MCHC RBC AUTO-ENTMCNC: 33.3 G/DL (ref 31.5–35.7)
MCV RBC AUTO: 98.3 FL (ref 79–97)
MONOCYTES # BLD AUTO: 0.71 10*3/MM3 (ref 0.1–0.9)
MONOCYTES NFR BLD AUTO: 9.2 % (ref 5–12)
NEUTROPHILS # BLD AUTO: 4.96 10*3/MM3 (ref 1.7–7)
NEUTROPHILS NFR BLD AUTO: 64.5 % (ref 42.7–76)
NITRITE UR QL STRIP: NEGATIVE
NRBC BLD AUTO-RTO: 0 /100 WBC (ref 0–0.2)
PH UR STRIP.AUTO: 6.5 [PH] (ref 5–8)
PLATELET # BLD AUTO: 303 10*3/MM3 (ref 140–450)
PMV BLD AUTO: 9.8 FL (ref 6–12)
POTASSIUM BLD-SCNC: 3.7 MMOL/L (ref 3.5–5.2)
PROT SERPL-MCNC: 6.4 G/DL (ref 6–8.5)
PROT UR QL STRIP: NEGATIVE
PROTHROMBIN TIME: 13.2 SECONDS (ref 11.2–14.3)
RBC # BLD AUTO: 3.51 10*6/MM3 (ref 3.77–5.28)
RBC # UR: ABNORMAL /HPF
REF LAB TEST METHOD: ABNORMAL
SODIUM BLD-SCNC: 131 MMOL/L (ref 136–145)
SP GR UR STRIP: 1.01 (ref 1–1.03)
SQUAMOUS #/AREA URNS HPF: ABNORMAL /HPF
UROBILINOGEN UR QL STRIP: ABNORMAL
WBC NRBC COR # BLD: 7.69 10*3/MM3 (ref 3.4–10.8)
WBC UR QL AUTO: ABNORMAL /HPF
WHOLE BLOOD HOLD SPECIMEN: NORMAL
WHOLE BLOOD HOLD SPECIMEN: NORMAL

## 2019-12-28 PROCEDURE — 25010000002 PROPOFOL 10 MG/ML EMULSION: Performed by: EMERGENCY MEDICINE

## 2019-12-28 PROCEDURE — 87086 URINE CULTURE/COLONY COUNT: CPT | Performed by: PHYSICIAN ASSISTANT

## 2019-12-28 PROCEDURE — 0QSK04Z REPOSITION LEFT FIBULA WITH INTERNAL FIXATION DEVICE, OPEN APPROACH: ICD-10-PCS | Performed by: ORTHOPAEDIC SURGERY

## 2019-12-28 PROCEDURE — 81001 URINALYSIS AUTO W/SCOPE: CPT | Performed by: PHYSICIAN ASSISTANT

## 2019-12-28 PROCEDURE — 25010000002 DEXAMETHASONE SODIUM PHOSPHATE 10 MG/ML SOLUTION: Performed by: ANESTHESIOLOGY

## 2019-12-28 PROCEDURE — 73610 X-RAY EXAM OF ANKLE: CPT

## 2019-12-28 PROCEDURE — 80053 COMPREHEN METABOLIC PANEL: CPT | Performed by: EMERGENCY MEDICINE

## 2019-12-28 PROCEDURE — C1713 ANCHOR/SCREW BN/BN,TIS/BN: HCPCS | Performed by: ORTHOPAEDIC SURGERY

## 2019-12-28 PROCEDURE — G0378 HOSPITAL OBSERVATION PER HR: HCPCS

## 2019-12-28 PROCEDURE — 25010000002 HYDROMORPHONE PER 4 MG: Performed by: INTERNAL MEDICINE

## 2019-12-28 PROCEDURE — 25010000002 MORPHINE PER 10 MG: Performed by: INTERNAL MEDICINE

## 2019-12-28 PROCEDURE — 25010000002 FENTANYL CITRATE (PF) 100 MCG/2ML SOLUTION: Performed by: NURSE ANESTHETIST, CERTIFIED REGISTERED

## 2019-12-28 PROCEDURE — 25010000002 ROPIVACAINE PER 1 MG: Performed by: ORTHOPAEDIC SURGERY

## 2019-12-28 PROCEDURE — 25010000002 ONDANSETRON PER 1 MG: Performed by: NURSE ANESTHETIST, CERTIFIED REGISTERED

## 2019-12-28 PROCEDURE — 25010000002 PROPOFOL 10 MG/ML EMULSION: Performed by: NURSE ANESTHETIST, CERTIFIED REGISTERED

## 2019-12-28 PROCEDURE — 25010000002 HYDROMORPHONE PER 4 MG: Performed by: ORTHOPAEDIC SURGERY

## 2019-12-28 PROCEDURE — 25010000002 ONDANSETRON PER 1 MG: Performed by: EMERGENCY MEDICINE

## 2019-12-28 PROCEDURE — 76000 FLUOROSCOPY <1 HR PHYS/QHP: CPT

## 2019-12-28 PROCEDURE — 85610 PROTHROMBIN TIME: CPT | Performed by: EMERGENCY MEDICINE

## 2019-12-28 PROCEDURE — 25010000002 ROPIVACAINE PER 1 MG: Performed by: ANESTHESIOLOGY

## 2019-12-28 PROCEDURE — 99223 1ST HOSP IP/OBS HIGH 75: CPT | Performed by: INTERNAL MEDICINE

## 2019-12-28 PROCEDURE — 93005 ELECTROCARDIOGRAM TRACING: CPT | Performed by: EMERGENCY MEDICINE

## 2019-12-28 PROCEDURE — C1769 GUIDE WIRE: HCPCS | Performed by: ORTHOPAEDIC SURGERY

## 2019-12-28 PROCEDURE — 25010000002 HYDROMORPHONE PER 4 MG: Performed by: EMERGENCY MEDICINE

## 2019-12-28 PROCEDURE — 25010000002 DEXAMETHASONE PER 1 MG: Performed by: NURSE ANESTHETIST, CERTIFIED REGISTERED

## 2019-12-28 PROCEDURE — 85025 COMPLETE CBC W/AUTO DIFF WBC: CPT | Performed by: EMERGENCY MEDICINE

## 2019-12-28 PROCEDURE — 0QSH04Z REPOSITION LEFT TIBIA WITH INTERNAL FIXATION DEVICE, OPEN APPROACH: ICD-10-PCS | Performed by: ORTHOPAEDIC SURGERY

## 2019-12-28 PROCEDURE — 99152 MOD SED SAME PHYS/QHP 5/>YRS: CPT

## 2019-12-28 DEVICE — SCRW LK ST STRDRV 2.7X16MM: Type: IMPLANTABLE DEVICE | Status: FUNCTIONAL

## 2019-12-28 DEVICE — SCRW CORT S/TAP 3.5X12MM: Type: IMPLANTABLE DEVICE | Status: FUNCTIONAL

## 2019-12-28 DEVICE — PLT FIB LCP L/D 4H 2.7/3.5X86LT: Type: IMPLANTABLE DEVICE | Status: FUNCTIONAL

## 2019-12-28 DEVICE — SCRW CORT S/TAP 2.7X18MM: Type: IMPLANTABLE DEVICE | Status: FUNCTIONAL

## 2019-12-28 DEVICE — SCRW LK ST STRDRV 2.7X14MM: Type: IMPLANTABLE DEVICE | Status: FUNCTIONAL

## 2019-12-28 DEVICE — SCRW CANN SHRT THRD 1/3 4X48MM: Type: IMPLANTABLE DEVICE | Status: FUNCTIONAL

## 2019-12-28 DEVICE — SCRW LK ST STRDRV 2.7X12MM: Type: IMPLANTABLE DEVICE | Status: FUNCTIONAL

## 2019-12-28 RX ORDER — HYDROMORPHONE HYDROCHLORIDE 1 MG/ML
0.5 INJECTION, SOLUTION INTRAMUSCULAR; INTRAVENOUS; SUBCUTANEOUS ONCE
Status: COMPLETED | OUTPATIENT
Start: 2019-12-28 | End: 2019-12-28

## 2019-12-28 RX ORDER — ONDANSETRON 2 MG/ML
INJECTION INTRAMUSCULAR; INTRAVENOUS AS NEEDED
Status: DISCONTINUED | OUTPATIENT
Start: 2019-12-28 | End: 2019-12-28 | Stop reason: SURG

## 2019-12-28 RX ORDER — MORPHINE SULFATE 2 MG/ML
2 INJECTION, SOLUTION INTRAMUSCULAR; INTRAVENOUS EVERY 4 HOURS PRN
Status: DISCONTINUED | OUTPATIENT
Start: 2019-12-28 | End: 2019-12-28

## 2019-12-28 RX ORDER — SODIUM CHLORIDE 9 MG/ML
75 INJECTION, SOLUTION INTRAVENOUS CONTINUOUS
Status: DISCONTINUED | OUTPATIENT
Start: 2019-12-28 | End: 2020-01-01 | Stop reason: HOSPADM

## 2019-12-28 RX ORDER — BUPIVACAINE HYDROCHLORIDE 2.5 MG/ML
INJECTION, SOLUTION EPIDURAL; INFILTRATION; INTRACAUDAL
Status: COMPLETED | OUTPATIENT
Start: 2019-12-28 | End: 2019-12-28

## 2019-12-28 RX ORDER — CLINDAMYCIN PHOSPHATE 900 MG/50ML
900 INJECTION INTRAVENOUS EVERY 8 HOURS
Status: COMPLETED | OUTPATIENT
Start: 2019-12-28 | End: 2019-12-29

## 2019-12-28 RX ORDER — HYDROCODONE BITARTRATE AND ACETAMINOPHEN 7.5; 325 MG/1; MG/1
2 TABLET ORAL EVERY 4 HOURS PRN
Status: DISCONTINUED | OUTPATIENT
Start: 2019-12-28 | End: 2019-12-29

## 2019-12-28 RX ORDER — NALOXONE HCL 0.4 MG/ML
0.4 VIAL (ML) INJECTION AS NEEDED
Status: DISCONTINUED | OUTPATIENT
Start: 2019-12-28 | End: 2019-12-28

## 2019-12-28 RX ORDER — HYDRALAZINE HYDROCHLORIDE 20 MG/ML
5 INJECTION INTRAMUSCULAR; INTRAVENOUS
Status: DISCONTINUED | OUTPATIENT
Start: 2019-12-28 | End: 2019-12-28

## 2019-12-28 RX ORDER — FENTANYL CITRATE 50 UG/ML
50 INJECTION, SOLUTION INTRAMUSCULAR; INTRAVENOUS
Status: DISCONTINUED | OUTPATIENT
Start: 2019-12-28 | End: 2019-12-28

## 2019-12-28 RX ORDER — ACETAMINOPHEN 650 MG/1
650 SUPPOSITORY RECTAL EVERY 4 HOURS PRN
Status: DISCONTINUED | OUTPATIENT
Start: 2019-12-28 | End: 2019-12-28

## 2019-12-28 RX ORDER — IPRATROPIUM BROMIDE AND ALBUTEROL SULFATE 2.5; .5 MG/3ML; MG/3ML
3 SOLUTION RESPIRATORY (INHALATION) ONCE AS NEEDED
Status: DISCONTINUED | OUTPATIENT
Start: 2019-12-28 | End: 2019-12-28

## 2019-12-28 RX ORDER — ACETAMINOPHEN 325 MG/1
650 TABLET ORAL EVERY 4 HOURS PRN
Status: DISCONTINUED | OUTPATIENT
Start: 2019-12-28 | End: 2019-12-28

## 2019-12-28 RX ORDER — HYDROMORPHONE HCL 110MG/55ML
0.5 PATIENT CONTROLLED ANALGESIA SYRINGE INTRAVENOUS
Status: DISCONTINUED | OUTPATIENT
Start: 2019-12-28 | End: 2019-12-29

## 2019-12-28 RX ORDER — HYDROMORPHONE HYDROCHLORIDE 1 MG/ML
0.5 INJECTION, SOLUTION INTRAMUSCULAR; INTRAVENOUS; SUBCUTANEOUS
Status: DISCONTINUED | OUTPATIENT
Start: 2019-12-28 | End: 2019-12-29

## 2019-12-28 RX ORDER — ONDANSETRON 4 MG/1
4 TABLET, FILM COATED ORAL EVERY 6 HOURS PRN
Status: DISCONTINUED | OUTPATIENT
Start: 2019-12-28 | End: 2020-01-01 | Stop reason: HOSPADM

## 2019-12-28 RX ORDER — LABETALOL HYDROCHLORIDE 5 MG/ML
5 INJECTION, SOLUTION INTRAVENOUS
Status: DISCONTINUED | OUTPATIENT
Start: 2019-12-28 | End: 2019-12-28

## 2019-12-28 RX ORDER — FAMOTIDINE 20 MG/1
20 TABLET, FILM COATED ORAL
Status: COMPLETED | OUTPATIENT
Start: 2019-12-28 | End: 2019-12-28

## 2019-12-28 RX ORDER — NALOXONE HCL 0.4 MG/ML
0.4 VIAL (ML) INJECTION
Status: DISCONTINUED | OUTPATIENT
Start: 2019-12-28 | End: 2020-01-01 | Stop reason: HOSPADM

## 2019-12-28 RX ORDER — SODIUM CHLORIDE 0.9 % (FLUSH) 0.9 %
3-10 SYRINGE (ML) INJECTION AS NEEDED
Status: DISCONTINUED | OUTPATIENT
Start: 2019-12-28 | End: 2019-12-28

## 2019-12-28 RX ORDER — CHOLECALCIFEROL (VITAMIN D3) 125 MCG
5 CAPSULE ORAL NIGHTLY PRN
Status: DISCONTINUED | OUTPATIENT
Start: 2019-12-28 | End: 2020-01-01 | Stop reason: HOSPADM

## 2019-12-28 RX ORDER — QUETIAPINE FUMARATE 100 MG/1
100 TABLET, FILM COATED ORAL NIGHTLY
Status: DISCONTINUED | OUTPATIENT
Start: 2019-12-28 | End: 2020-01-01 | Stop reason: HOSPADM

## 2019-12-28 RX ORDER — SODIUM CHLORIDE 9 MG/ML
50 INJECTION, SOLUTION INTRAVENOUS CONTINUOUS
Status: ACTIVE | OUTPATIENT
Start: 2019-12-28 | End: 2019-12-28

## 2019-12-28 RX ORDER — PROMETHAZINE HYDROCHLORIDE 25 MG/ML
6.25 INJECTION, SOLUTION INTRAMUSCULAR; INTRAVENOUS ONCE AS NEEDED
Status: DISCONTINUED | OUTPATIENT
Start: 2019-12-28 | End: 2019-12-28

## 2019-12-28 RX ORDER — DEXAMETHASONE SODIUM PHOSPHATE 4 MG/ML
INJECTION, SOLUTION INTRA-ARTICULAR; INTRALESIONAL; INTRAMUSCULAR; INTRAVENOUS; SOFT TISSUE AS NEEDED
Status: DISCONTINUED | OUTPATIENT
Start: 2019-12-28 | End: 2019-12-28 | Stop reason: SURG

## 2019-12-28 RX ORDER — SODIUM CHLORIDE 0.9 % (FLUSH) 0.9 %
10 SYRINGE (ML) INJECTION AS NEEDED
Status: DISCONTINUED | OUTPATIENT
Start: 2019-12-28 | End: 2020-01-01 | Stop reason: HOSPADM

## 2019-12-28 RX ORDER — PROPOFOL 10 MG/ML
60 VIAL (ML) INTRAVENOUS ONCE
Status: COMPLETED | OUTPATIENT
Start: 2019-12-28 | End: 2019-12-28

## 2019-12-28 RX ORDER — SODIUM CHLORIDE 0.9 % (FLUSH) 0.9 %
3 SYRINGE (ML) INJECTION EVERY 12 HOURS SCHEDULED
Status: DISCONTINUED | OUTPATIENT
Start: 2019-12-28 | End: 2019-12-28

## 2019-12-28 RX ORDER — LIDOCAINE HYDROCHLORIDE 10 MG/ML
INJECTION, SOLUTION EPIDURAL; INFILTRATION; INTRACAUDAL; PERINEURAL AS NEEDED
Status: DISCONTINUED | OUTPATIENT
Start: 2019-12-28 | End: 2019-12-28 | Stop reason: SURG

## 2019-12-28 RX ORDER — PROMETHAZINE HYDROCHLORIDE 25 MG/1
25 SUPPOSITORY RECTAL ONCE AS NEEDED
Status: DISCONTINUED | OUTPATIENT
Start: 2019-12-28 | End: 2019-12-28

## 2019-12-28 RX ORDER — ONDANSETRON 2 MG/ML
4 INJECTION INTRAMUSCULAR; INTRAVENOUS EVERY 6 HOURS PRN
Status: DISCONTINUED | OUTPATIENT
Start: 2019-12-28 | End: 2020-01-01 | Stop reason: HOSPADM

## 2019-12-28 RX ORDER — DIPHENHYDRAMINE HCL 25 MG
25 CAPSULE ORAL NIGHTLY PRN
Status: DISCONTINUED | OUTPATIENT
Start: 2019-12-28 | End: 2020-01-01 | Stop reason: HOSPADM

## 2019-12-28 RX ORDER — HYDROMORPHONE HYDROCHLORIDE 1 MG/ML
0.5 INJECTION, SOLUTION INTRAMUSCULAR; INTRAVENOUS; SUBCUTANEOUS
Status: DISCONTINUED | OUTPATIENT
Start: 2019-12-28 | End: 2019-12-28

## 2019-12-28 RX ORDER — ROPIVACAINE HYDROCHLORIDE 2 MG/ML
6 INJECTION, SOLUTION EPIDURAL; INFILTRATION CONTINUOUS
Status: DISCONTINUED | OUTPATIENT
Start: 2019-12-28 | End: 2019-12-30

## 2019-12-28 RX ORDER — SODIUM CHLORIDE 0.9 % (FLUSH) 0.9 %
10 SYRINGE (ML) INJECTION EVERY 12 HOURS SCHEDULED
Status: DISCONTINUED | OUTPATIENT
Start: 2019-12-28 | End: 2020-01-01 | Stop reason: HOSPADM

## 2019-12-28 RX ORDER — SODIUM CHLORIDE 9 MG/ML
125 INJECTION, SOLUTION INTRAVENOUS CONTINUOUS
Status: DISCONTINUED | OUTPATIENT
Start: 2019-12-28 | End: 2019-12-28

## 2019-12-28 RX ORDER — HYDROCODONE BITARTRATE AND ACETAMINOPHEN 7.5; 325 MG/1; MG/1
1 TABLET ORAL EVERY 4 HOURS PRN
Status: DISCONTINUED | OUTPATIENT
Start: 2019-12-28 | End: 2019-12-29

## 2019-12-28 RX ORDER — CLINDAMYCIN PHOSPHATE 900 MG/50ML
900 INJECTION INTRAVENOUS ONCE
Status: COMPLETED | OUTPATIENT
Start: 2019-12-28 | End: 2019-12-28

## 2019-12-28 RX ORDER — ONDANSETRON 2 MG/ML
4 INJECTION INTRAMUSCULAR; INTRAVENOUS EVERY 6 HOURS PRN
Status: DISCONTINUED | OUTPATIENT
Start: 2019-12-28 | End: 2019-12-28

## 2019-12-28 RX ORDER — ACETAMINOPHEN 325 MG/1
650 TABLET ORAL EVERY 4 HOURS PRN
Status: DISCONTINUED | OUTPATIENT
Start: 2019-12-28 | End: 2020-01-01 | Stop reason: HOSPADM

## 2019-12-28 RX ORDER — TIZANIDINE 4 MG/1
4 TABLET ORAL NIGHTLY PRN
Status: DISCONTINUED | OUTPATIENT
Start: 2019-12-28 | End: 2020-01-01 | Stop reason: HOSPADM

## 2019-12-28 RX ORDER — MEPERIDINE HYDROCHLORIDE 25 MG/ML
12.5 INJECTION INTRAMUSCULAR; INTRAVENOUS; SUBCUTANEOUS
Status: DISCONTINUED | OUTPATIENT
Start: 2019-12-28 | End: 2019-12-28

## 2019-12-28 RX ORDER — ONDANSETRON 2 MG/ML
4 INJECTION INTRAMUSCULAR; INTRAVENOUS ONCE AS NEEDED
Status: DISCONTINUED | OUTPATIENT
Start: 2019-12-28 | End: 2019-12-28

## 2019-12-28 RX ORDER — ACETAMINOPHEN 325 MG/1
325 TABLET ORAL EVERY 4 HOURS PRN
Status: DISCONTINUED | OUTPATIENT
Start: 2019-12-28 | End: 2020-01-01 | Stop reason: HOSPADM

## 2019-12-28 RX ORDER — SODIUM CHLORIDE, SODIUM LACTATE, POTASSIUM CHLORIDE, CALCIUM CHLORIDE 600; 310; 30; 20 MG/100ML; MG/100ML; MG/100ML; MG/100ML
9 INJECTION, SOLUTION INTRAVENOUS CONTINUOUS PRN
Status: DISCONTINUED | OUTPATIENT
Start: 2019-12-28 | End: 2020-01-01 | Stop reason: HOSPADM

## 2019-12-28 RX ORDER — DIPHENHYDRAMINE HYDROCHLORIDE 50 MG/ML
25 INJECTION INTRAMUSCULAR; INTRAVENOUS NIGHTLY PRN
Status: DISCONTINUED | OUTPATIENT
Start: 2019-12-28 | End: 2020-01-01 | Stop reason: HOSPADM

## 2019-12-28 RX ORDER — HYDROCODONE BITARTRATE AND ACETAMINOPHEN 5; 325 MG/1; MG/1
1 TABLET ORAL ONCE AS NEEDED
Status: DISCONTINUED | OUTPATIENT
Start: 2019-12-28 | End: 2019-12-28

## 2019-12-28 RX ORDER — DEXAMETHASONE SODIUM PHOSPHATE 10 MG/ML
INJECTION, SOLUTION INTRAMUSCULAR; INTRAVENOUS
Status: COMPLETED | OUTPATIENT
Start: 2019-12-28 | End: 2019-12-28

## 2019-12-28 RX ORDER — DIPHENHYDRAMINE HCL 25 MG
25 CAPSULE ORAL EVERY 6 HOURS PRN
Status: DISCONTINUED | OUTPATIENT
Start: 2019-12-28 | End: 2020-01-01 | Stop reason: HOSPADM

## 2019-12-28 RX ORDER — ONDANSETRON 2 MG/ML
4 INJECTION INTRAMUSCULAR; INTRAVENOUS ONCE
Status: COMPLETED | OUTPATIENT
Start: 2019-12-28 | End: 2019-12-28

## 2019-12-28 RX ORDER — BISACODYL 10 MG
10 SUPPOSITORY, RECTAL RECTAL DAILY PRN
Status: DISCONTINUED | OUTPATIENT
Start: 2019-12-28 | End: 2020-01-01 | Stop reason: HOSPADM

## 2019-12-28 RX ORDER — OXCARBAZEPINE 150 MG/1
300 TABLET, FILM COATED ORAL 2 TIMES DAILY
Status: DISCONTINUED | OUTPATIENT
Start: 2019-12-28 | End: 2020-01-01 | Stop reason: HOSPADM

## 2019-12-28 RX ORDER — ACETAMINOPHEN 650 MG/1
650 SUPPOSITORY RECTAL EVERY 4 HOURS PRN
Status: DISCONTINUED | OUTPATIENT
Start: 2019-12-28 | End: 2020-01-01 | Stop reason: HOSPADM

## 2019-12-28 RX ORDER — ACETAMINOPHEN 160 MG/5ML
650 SOLUTION ORAL EVERY 4 HOURS PRN
Status: DISCONTINUED | OUTPATIENT
Start: 2019-12-28 | End: 2019-12-28

## 2019-12-28 RX ORDER — ASPIRIN 325 MG
325 TABLET, DELAYED RELEASE (ENTERIC COATED) ORAL DAILY
Status: DISCONTINUED | OUTPATIENT
Start: 2019-12-29 | End: 2020-01-01 | Stop reason: HOSPADM

## 2019-12-28 RX ORDER — PROMETHAZINE HYDROCHLORIDE 25 MG/1
25 TABLET ORAL ONCE AS NEEDED
Status: DISCONTINUED | OUTPATIENT
Start: 2019-12-28 | End: 2019-12-28

## 2019-12-28 RX ORDER — PROPOFOL 10 MG/ML
VIAL (ML) INTRAVENOUS AS NEEDED
Status: DISCONTINUED | OUTPATIENT
Start: 2019-12-28 | End: 2019-12-28 | Stop reason: SURG

## 2019-12-28 RX ORDER — MAGNESIUM HYDROXIDE 1200 MG/15ML
LIQUID ORAL AS NEEDED
Status: DISCONTINUED | OUTPATIENT
Start: 2019-12-28 | End: 2019-12-28 | Stop reason: HOSPADM

## 2019-12-28 RX ADMIN — EPHEDRINE SULFATE 10 MG: 50 INJECTION INTRAMUSCULAR; INTRAVENOUS; SUBCUTANEOUS at 14:28

## 2019-12-28 RX ADMIN — OXCARBAZEPINE 300 MG: 150 TABLET, FILM COATED ORAL at 22:36

## 2019-12-28 RX ADMIN — HYDROMORPHONE HYDROCHLORIDE 0.5 MG: 1 INJECTION, SOLUTION INTRAMUSCULAR; INTRAVENOUS; SUBCUTANEOUS at 02:03

## 2019-12-28 RX ADMIN — QUETIAPINE FUMARATE 100 MG: 100 TABLET ORAL at 22:26

## 2019-12-28 RX ADMIN — SODIUM CHLORIDE 50 ML/HR: 9 INJECTION, SOLUTION INTRAVENOUS at 03:13

## 2019-12-28 RX ADMIN — ONDANSETRON 4 MG: 2 INJECTION INTRAMUSCULAR; INTRAVENOUS at 00:29

## 2019-12-28 RX ADMIN — DEXAMETHASONE SODIUM PHOSPHATE 8 MG: 4 INJECTION, SOLUTION INTRAMUSCULAR; INTRAVENOUS at 14:37

## 2019-12-28 RX ADMIN — CLINDAMYCIN PHOSPHATE 900 MG: 18 INJECTION, SOLUTION INTRAVENOUS at 14:18

## 2019-12-28 RX ADMIN — HYDROMORPHONE HYDROCHLORIDE 0.5 MG: 1 INJECTION, SOLUTION INTRAMUSCULAR; INTRAVENOUS; SUBCUTANEOUS at 01:05

## 2019-12-28 RX ADMIN — HYDROMORPHONE HYDROCHLORIDE 0.5 MG: 1 INJECTION, SOLUTION INTRAMUSCULAR; INTRAVENOUS; SUBCUTANEOUS at 00:30

## 2019-12-28 RX ADMIN — FENTANYL CITRATE 50 MCG: 0.05 INJECTION, SOLUTION INTRAMUSCULAR; INTRAVENOUS at 15:55

## 2019-12-28 RX ADMIN — DEXAMETHASONE SODIUM PHOSPHATE 2 MG: 10 INJECTION, SOLUTION INTRAMUSCULAR; INTRAVENOUS at 12:35

## 2019-12-28 RX ADMIN — HYDROMORPHONE HYDROCHLORIDE 0.5 MG: 1 INJECTION, SOLUTION INTRAMUSCULAR; INTRAVENOUS; SUBCUTANEOUS at 09:45

## 2019-12-28 RX ADMIN — SODIUM CHLORIDE 125 ML/HR: 9 INJECTION, SOLUTION INTRAVENOUS at 00:11

## 2019-12-28 RX ADMIN — ONDANSETRON 4 MG: 2 INJECTION INTRAMUSCULAR; INTRAVENOUS at 14:37

## 2019-12-28 RX ADMIN — HYDROMORPHONE HYDROCHLORIDE 0.5 MG: 1 INJECTION, SOLUTION INTRAMUSCULAR; INTRAVENOUS; SUBCUTANEOUS at 06:35

## 2019-12-28 RX ADMIN — BUPIVACAINE HYDROCHLORIDE 20 ML: 2.5 INJECTION, SOLUTION EPIDURAL; INFILTRATION; INTRACAUDAL; PERINEURAL at 12:35

## 2019-12-28 RX ADMIN — SODIUM CHLORIDE, POTASSIUM CHLORIDE, SODIUM LACTATE AND CALCIUM CHLORIDE: 600; 310; 30; 20 INJECTION, SOLUTION INTRAVENOUS at 14:18

## 2019-12-28 RX ADMIN — OXCARBAZEPINE 300 MG: 150 TABLET, FILM COATED ORAL at 09:46

## 2019-12-28 RX ADMIN — SODIUM CHLORIDE 1000 ML: 9 INJECTION, SOLUTION INTRAVENOUS at 00:10

## 2019-12-28 RX ADMIN — SODIUM CHLORIDE, PRESERVATIVE FREE 10 ML: 5 INJECTION INTRAVENOUS at 22:27

## 2019-12-28 RX ADMIN — SODIUM CHLORIDE, PRESERVATIVE FREE 10 ML: 5 INJECTION INTRAVENOUS at 09:46

## 2019-12-28 RX ADMIN — LIDOCAINE HYDROCHLORIDE 50 MG: 10 INJECTION, SOLUTION EPIDURAL; INFILTRATION; INTRACAUDAL; PERINEURAL at 14:24

## 2019-12-28 RX ADMIN — MELATONIN TAB 5 MG 5 MG: 5 TAB at 03:11

## 2019-12-28 RX ADMIN — MORPHINE SULFATE 2 MG: 2 INJECTION, SOLUTION INTRAMUSCULAR; INTRAVENOUS at 03:11

## 2019-12-28 RX ADMIN — CLINDAMYCIN IN 5 PERCENT DEXTROSE 900 MG: 18 INJECTION, SOLUTION INTRAVENOUS at 22:26

## 2019-12-28 RX ADMIN — EPHEDRINE SULFATE 5 MG: 50 INJECTION INTRAMUSCULAR; INTRAVENOUS; SUBCUTANEOUS at 14:36

## 2019-12-28 RX ADMIN — PROPOFOL 40 MG: 10 INJECTION, EMULSION INTRAVENOUS at 00:58

## 2019-12-28 RX ADMIN — BUPIVACAINE HYDROCHLORIDE 30 ML: 2.5 INJECTION, SOLUTION EPIDURAL; INFILTRATION; INTRACAUDAL; PERINEURAL at 12:20

## 2019-12-28 RX ADMIN — PROPOFOL 150 MG: 10 INJECTION, EMULSION INTRAVENOUS at 14:24

## 2019-12-28 RX ADMIN — ROPIVACAINE HYDROCHLORIDE 6 ML/HR: 2 INJECTION, SOLUTION EPIDURAL; INFILTRATION at 15:54

## 2019-12-28 RX ADMIN — FAMOTIDINE 20 MG: 20 TABLET ORAL at 12:06

## 2019-12-28 NOTE — ANESTHESIA PREPROCEDURE EVALUATION
Anesthesia Evaluation     Patient summary reviewed and Nursing notes reviewed   no history of anesthetic complications:  NPO Solid Status: > 8 hours  NPO Liquid Status: > 2 hours           Airway   Mallampati: II  TM distance: >3 FB  Neck ROM: full  No difficulty expected  Dental - normal exam     Pulmonary - negative pulmonary ROS and normal exam    breath sounds clear to auscultation  Cardiovascular - normal exam    ECG reviewed  Rhythm: regular  Rate: normal    (+) hypertension, valvular problems/murmurs (Mild AI) AI,     ROS comment: 1/19 - Echo - nml EF, mild AI    Neuro/Psych    ROS Comment: Multiple sclerosis  GI/Hepatic/Renal/Endo    (+)  GERD,  renal disease CRI,     Musculoskeletal         ROS comment: Left ankle fx  Abdominal    Substance History      OB/GYN          Other   arthritis,                      Anesthesia Plan    ASA 3     general with block   (Left popliteal catheter with arrow pump, left adductor canal block single shot for post-operative analgesia per request of Dr. Lozoya)  intravenous induction     Anesthetic plan, all risks, benefits, and alternatives have been provided, discussed and informed consent has been obtained with: patient.    Plan discussed with CRNA.

## 2019-12-28 NOTE — ANESTHESIA PROCEDURE NOTES
Peripheral Block      Patient reassessed immediately prior to procedure    Patient location during procedure: post-op  Start time: 12/28/2019 12:25 PM  Stop time: 12/28/2019 12:30 PM  Reason for block: at surgeon's request and post-op pain management  Performed by  Anesthesiologist: Naseem Claudio MD  Assisted by: Sara Almeida RN  Preanesthetic Checklist  Completed: patient identified, site marked, surgical consent, pre-op evaluation, timeout performed, IV checked, risks and benefits discussed and monitors and equipment checked  Prep:  Pt Position: supine  Sterile barriers:cap, gloves, mask and sterile barriers  Patient monitoring: blood pressure monitoring, continuous pulse oximetry and EKG  Procedure  Performed under: general  Guidance:ultrasound guided  Images:still images obtained, printed/placed on chart    Laterality:left  Block Type:adductor canal block  Injection Technique:single-shot  Needle Type:short-bevel and echogenic  Needle Gauge:20 G  Resistance on Injection: none  Catheter size: 20g.    Medications Used: dexamethasone sodium phosphate injection, 2 mg  bupivacaine PF (MARCAINE) 0.25 % injection, 20 mL  Med admintered at 12/28/2019 12:35 PM      Post Assessment  Injection Assessment: negative aspiration for heme, incremental injection and no paresthesia on injection  Patient Tolerance:comfortable throughout block  Complications:no  Additional Notes  Procedure:             The pt was placed in the Supine position.  The Insertion site was  prepped and Draped in sterile fashion.  The pt was anesthetized with  IV Sedation( see meds).  Skin and cutaneous tissue was infiltrated and anesthetized with 1% Lidocaine 3 mls via a 25g needle.  A BBraun 4 inch 18g echogenic needle was then  inserted approximately midline, mid-thigh and advanced In-plane with Ultrasound guidance.  Normal Saline PSF was utilized for hydrodissection of tissue.  The Vastus medialis and Sartorius muscle where visualized and the  needle tip was placed in the adductor canal,  lateral to the femoral artery.  LA injection spread was visualized, injection was incremental 1-5ml, injection pressure was normal or little, no intraneural injection, no vascular injection.  LA dose was injected thru the needle(see dose above).  A BBraun 20g wire stylet catheter was placed via the needle with ultrasound visualization and confirmation with NS fluid bolus. The labeled Catheter was then secured to skin at insertion site with skin afix and steristrips to curled catheter and CHG transparent dressing.  Thank you.

## 2019-12-28 NOTE — ANESTHESIA PROCEDURE NOTES
Peripheral Block      Patient reassessed immediately prior to procedure    Patient location during procedure: pre-op  Start time: 12/28/2019 12:15 PM  Stop time: 12/28/2019 12:25 PM  Reason for block: at surgeon's request and post-op pain management  Performed by  Anesthesiologist: Naseem Claudio MD  Assisted by: Sara Almeida RN  Preanesthetic Checklist  Completed: patient identified, site marked, surgical consent, pre-op evaluation, timeout performed, IV checked, risks and benefits discussed and monitors and equipment checked  Prep:  Pt Position: right lateral decubitus  Sterile barriers:cap, gloves, mask and sterile barriers  Prep: ChloraPrep  Patient monitoring: blood pressure monitoring, continuous pulse oximetry and EKG  Procedure  Sedation:yes  Performed under: local infiltration  Guidance:ultrasound guided  Images:still images obtained, printed/placed on chart    Laterality:left  Block Type:popliteal  Injection Technique:catheter  Needle Type:echogenic  Needle Gauge:18 G  Resistance on Injection: none  Catheter Size:20 G  Cath Depth at skin: 7 cm    Medications Used: bupivacaine PF (MARCAINE) 0.25 % injection, 30 mL  Med admintered at 12/28/2019 12:20 PM      Post Assessment  Injection Assessment: negative aspiration for heme, no paresthesia on injection and incremental injection  Patient Tolerance:comfortable throughout block  Complications:no  Additional Notes  Procedure:                                                         The pt was placed in  lateral position.  The Insertion site was  prepped and Draped in sterile fashion.  The pt was anesthetized with  IV Sedation( see meds).  Skin and cutaneous tissue was infiltrated and anesthetized with 1% Lidocaine 3 mls via a 25g needle.  A BBraun 4 inch 18g echogenic needle was then  inserted approximately 3 cm proximal to the popliteal brittney a at the lateral mid biceps femoris and advanced In-plane with Ultrasound guidance.  Normal Saline PSF was  utilized for hydrodissection of tissue.  The popliteal artery was visualized and the common peroneal and tibial bifurcation was located.  LA injection spread was visualized, injection was incremental 1-5ml, injection pressure was normal or little, no intraneural injection, no vascular injection.  .  A BBraun 20g wire stylet catheter was placed via the needle with ultrasound visualization and confirmation with NS fluid bolus. The labeled Catheter was then secured at insertions site with skin afix,  mastisol, steristrips  and a CHG transparent dressing was placed over. Thank you

## 2019-12-28 NOTE — ANESTHESIA POSTPROCEDURE EVALUATION
Patient: Angela Warren    Procedure Summary     Date:  12/28/19 Room / Location:   [facility]  [facility]    Anesthesia Start:  1418 Anesthesia Stop:  1546    Procedure:  ANKLE OPEN REDUCTION INTERNAL FIXATION (Left Ankle) Diagnosis:      Surgeon:  Stan Lozoya MD Provider:  Naseem Claudio MD    Anesthesia Type:  general with block ASA Status:  3          Anesthesia Type: general with block    Vitals  No vitals data found for the desired time range.          Post Anesthesia Care and Evaluation    Patient location during evaluation: PACU  Patient participation: complete - patient participated  Level of consciousness: awake and alert  Pain score: 0  Pain management: adequate  Airway patency: patent  Anesthetic complications: No anesthetic complications  PONV Status: none  Cardiovascular status: hemodynamically stable and acceptable  Respiratory status: nonlabored ventilation, acceptable and nasal cannula  Hydration status: acceptable

## 2019-12-28 NOTE — ANESTHESIA PROCEDURE NOTES
Airway  Urgency: elective    Date/Time: 12/28/2019 2:25 PM  Airway not difficult    General Information and Staff    Patient location during procedure: OR  CRNA: Mehdi Lambert III, CRNA    Indications and Patient Condition  Indications for airway management: airway protection    Preoxygenated: yes  Mask difficulty assessment: 0 - not attempted    Final Airway Details  Final airway type: supraglottic airway      Successful airway: classic and I-gel  Size 3    Number of attempts at approach: 1    Additional Comments  LMA placed without difficulty, ventilation with assist, equal breath sounds and symmetric chest rise and fall

## 2019-12-29 PROBLEM — Z87.891 HISTORY OF TOBACCO USE: Status: ACTIVE | Noted: 2019-12-29

## 2019-12-29 LAB
ANION GAP SERPL CALCULATED.3IONS-SCNC: 13 MMOL/L (ref 5–15)
BACTERIA SPEC AEROBE CULT: NORMAL
BASOPHILS # BLD AUTO: 0.04 10*3/MM3 (ref 0–0.2)
BASOPHILS NFR BLD AUTO: 0.3 % (ref 0–1.5)
BUN BLD-MCNC: 11 MG/DL (ref 8–23)
BUN/CREAT SERPL: 13.9 (ref 7–25)
CALCIUM SPEC-SCNC: 9.3 MG/DL (ref 8.6–10.5)
CHLORIDE SERPL-SCNC: 100 MMOL/L (ref 98–107)
CO2 SERPL-SCNC: 22 MMOL/L (ref 22–29)
CREAT BLD-MCNC: 0.79 MG/DL (ref 0.57–1)
DEPRECATED RDW RBC AUTO: 47.5 FL (ref 37–54)
EOSINOPHIL # BLD AUTO: 0.03 10*3/MM3 (ref 0–0.4)
EOSINOPHIL NFR BLD AUTO: 0.2 % (ref 0.3–6.2)
ERYTHROCYTE [DISTWIDTH] IN BLOOD BY AUTOMATED COUNT: 13.2 % (ref 12.3–15.4)
GFR SERPL CREATININE-BSD FRML MDRD: 72 ML/MIN/1.73
GLUCOSE BLD-MCNC: 106 MG/DL (ref 65–99)
HCT VFR BLD AUTO: 30.4 % (ref 34–46.6)
HGB BLD-MCNC: 9.9 G/DL (ref 12–15.9)
IMM GRANULOCYTES # BLD AUTO: 0.07 10*3/MM3 (ref 0–0.05)
IMM GRANULOCYTES NFR BLD AUTO: 0.6 % (ref 0–0.5)
LYMPHOCYTES # BLD AUTO: 2.49 10*3/MM3 (ref 0.7–3.1)
LYMPHOCYTES NFR BLD AUTO: 20.4 % (ref 19.6–45.3)
MCH RBC QN AUTO: 32.7 PG (ref 26.6–33)
MCHC RBC AUTO-ENTMCNC: 32.6 G/DL (ref 31.5–35.7)
MCV RBC AUTO: 100.3 FL (ref 79–97)
MONOCYTES # BLD AUTO: 1.56 10*3/MM3 (ref 0.1–0.9)
MONOCYTES NFR BLD AUTO: 12.8 % (ref 5–12)
NEUTROPHILS # BLD AUTO: 8.01 10*3/MM3 (ref 1.7–7)
NEUTROPHILS NFR BLD AUTO: 65.7 % (ref 42.7–76)
NRBC BLD AUTO-RTO: 0 /100 WBC (ref 0–0.2)
PLATELET # BLD AUTO: 292 10*3/MM3 (ref 140–450)
PMV BLD AUTO: 9.4 FL (ref 6–12)
POTASSIUM BLD-SCNC: 3.5 MMOL/L (ref 3.5–5.2)
RBC # BLD AUTO: 3.03 10*6/MM3 (ref 3.77–5.28)
SODIUM BLD-SCNC: 135 MMOL/L (ref 136–145)
WBC NRBC COR # BLD: 12.2 10*3/MM3 (ref 3.4–10.8)

## 2019-12-29 PROCEDURE — 85025 COMPLETE CBC W/AUTO DIFF WBC: CPT | Performed by: ORTHOPAEDIC SURGERY

## 2019-12-29 PROCEDURE — 25010000002 MORPHINE PER 10 MG: Performed by: INTERNAL MEDICINE

## 2019-12-29 PROCEDURE — 99232 SBSQ HOSP IP/OBS MODERATE 35: CPT | Performed by: INTERNAL MEDICINE

## 2019-12-29 PROCEDURE — 80048 BASIC METABOLIC PNL TOTAL CA: CPT | Performed by: ORTHOPAEDIC SURGERY

## 2019-12-29 PROCEDURE — 94799 UNLISTED PULMONARY SVC/PX: CPT

## 2019-12-29 PROCEDURE — 25010000002 HYDROMORPHONE PER 4 MG: Performed by: ORTHOPAEDIC SURGERY

## 2019-12-29 PROCEDURE — 97162 PT EVAL MOD COMPLEX 30 MIN: CPT

## 2019-12-29 PROCEDURE — 25010000002 ONDANSETRON PER 1 MG: Performed by: ORTHOPAEDIC SURGERY

## 2019-12-29 PROCEDURE — 97166 OT EVAL MOD COMPLEX 45 MIN: CPT | Performed by: OCCUPATIONAL THERAPIST

## 2019-12-29 PROCEDURE — G0378 HOSPITAL OBSERVATION PER HR: HCPCS

## 2019-12-29 RX ORDER — MORPHINE SULFATE 1 MG/ML
INJECTION INTRAVENOUS CONTINUOUS
Status: DISCONTINUED | OUTPATIENT
Start: 2019-12-29 | End: 2019-12-30

## 2019-12-29 RX ORDER — IPRATROPIUM BROMIDE AND ALBUTEROL SULFATE 2.5; .5 MG/3ML; MG/3ML
3 SOLUTION RESPIRATORY (INHALATION)
Status: DISCONTINUED | OUTPATIENT
Start: 2019-12-29 | End: 2020-01-01 | Stop reason: HOSPADM

## 2019-12-29 RX ORDER — NALOXONE HCL 0.4 MG/ML
0.1 VIAL (ML) INJECTION
Status: DISCONTINUED | OUTPATIENT
Start: 2019-12-29 | End: 2020-01-01 | Stop reason: HOSPADM

## 2019-12-29 RX ADMIN — HYDROMORPHONE HYDROCHLORIDE 0.5 MG: 2 INJECTION, SOLUTION INTRAMUSCULAR; INTRAVENOUS; SUBCUTANEOUS at 15:43

## 2019-12-29 RX ADMIN — CLINDAMYCIN IN 5 PERCENT DEXTROSE 900 MG: 18 INJECTION, SOLUTION INTRAVENOUS at 13:50

## 2019-12-29 RX ADMIN — HYDROCODONE BITARTRATE AND ACETAMINOPHEN 2 TABLET: 7.5; 325 TABLET ORAL at 17:21

## 2019-12-29 RX ADMIN — SODIUM CHLORIDE, PRESERVATIVE FREE 10 ML: 5 INJECTION INTRAVENOUS at 10:28

## 2019-12-29 RX ADMIN — QUETIAPINE FUMARATE 100 MG: 100 TABLET ORAL at 20:27

## 2019-12-29 RX ADMIN — ONDANSETRON 4 MG: 2 INJECTION INTRAMUSCULAR; INTRAVENOUS at 22:52

## 2019-12-29 RX ADMIN — OXCARBAZEPINE 300 MG: 150 TABLET, FILM COATED ORAL at 20:27

## 2019-12-29 RX ADMIN — HYDROCODONE BITARTRATE AND ACETAMINOPHEN 1 TABLET: 7.5; 325 TABLET ORAL at 12:17

## 2019-12-29 RX ADMIN — HYDROCODONE BITARTRATE AND ACETAMINOPHEN 1 TABLET: 7.5; 325 TABLET ORAL at 07:45

## 2019-12-29 RX ADMIN — HYDROMORPHONE HYDROCHLORIDE 0.5 MG: 1 INJECTION, SOLUTION INTRAMUSCULAR; INTRAVENOUS; SUBCUTANEOUS at 13:50

## 2019-12-29 RX ADMIN — MORPHINE SULFATE: 1 INJECTION INTRAVENOUS at 20:27

## 2019-12-29 RX ADMIN — CLINDAMYCIN IN 5 PERCENT DEXTROSE 900 MG: 18 INJECTION, SOLUTION INTRAVENOUS at 06:34

## 2019-12-29 RX ADMIN — ASPIRIN 325 MG: 325 TABLET, COATED ORAL at 10:27

## 2019-12-29 RX ADMIN — OXCARBAZEPINE 300 MG: 150 TABLET, FILM COATED ORAL at 11:13

## 2019-12-30 ENCOUNTER — ANESTHESIA EVENT (OUTPATIENT)
Dept: TELEMETRY | Facility: HOSPITAL | Age: 69
End: 2019-12-30

## 2019-12-30 ENCOUNTER — ANESTHESIA (OUTPATIENT)
Dept: TELEMETRY | Facility: HOSPITAL | Age: 69
End: 2019-12-30

## 2019-12-30 LAB
ANION GAP SERPL CALCULATED.3IONS-SCNC: 11 MMOL/L (ref 5–15)
BASOPHILS # BLD AUTO: 0.05 10*3/MM3 (ref 0–0.2)
BASOPHILS NFR BLD AUTO: 0.4 % (ref 0–1.5)
BUN BLD-MCNC: 7 MG/DL (ref 8–23)
BUN/CREAT SERPL: 10.8 (ref 7–25)
CALCIUM SPEC-SCNC: 9.2 MG/DL (ref 8.6–10.5)
CHLORIDE SERPL-SCNC: 97 MMOL/L (ref 98–107)
CO2 SERPL-SCNC: 26 MMOL/L (ref 22–29)
CREAT BLD-MCNC: 0.65 MG/DL (ref 0.57–1)
DEPRECATED RDW RBC AUTO: 48.8 FL (ref 37–54)
EOSINOPHIL # BLD AUTO: 0.32 10*3/MM3 (ref 0–0.4)
EOSINOPHIL NFR BLD AUTO: 2.8 % (ref 0.3–6.2)
ERYTHROCYTE [DISTWIDTH] IN BLOOD BY AUTOMATED COUNT: 13.4 % (ref 12.3–15.4)
GFR SERPL CREATININE-BSD FRML MDRD: 90 ML/MIN/1.73
GLUCOSE BLD-MCNC: 115 MG/DL (ref 65–99)
HCT VFR BLD AUTO: 31.1 % (ref 34–46.6)
HGB BLD-MCNC: 10.2 G/DL (ref 12–15.9)
IMM GRANULOCYTES # BLD AUTO: 0.06 10*3/MM3 (ref 0–0.05)
IMM GRANULOCYTES NFR BLD AUTO: 0.5 % (ref 0–0.5)
LYMPHOCYTES # BLD AUTO: 3.08 10*3/MM3 (ref 0.7–3.1)
LYMPHOCYTES NFR BLD AUTO: 27.2 % (ref 19.6–45.3)
MCH RBC QN AUTO: 32.8 PG (ref 26.6–33)
MCHC RBC AUTO-ENTMCNC: 32.8 G/DL (ref 31.5–35.7)
MCV RBC AUTO: 100 FL (ref 79–97)
MONOCYTES # BLD AUTO: 1.22 10*3/MM3 (ref 0.1–0.9)
MONOCYTES NFR BLD AUTO: 10.8 % (ref 5–12)
NEUTROPHILS # BLD AUTO: 6.59 10*3/MM3 (ref 1.7–7)
NEUTROPHILS NFR BLD AUTO: 58.3 % (ref 42.7–76)
NRBC BLD AUTO-RTO: 0 /100 WBC (ref 0–0.2)
PLATELET # BLD AUTO: 298 10*3/MM3 (ref 140–450)
PMV BLD AUTO: 9.5 FL (ref 6–12)
POTASSIUM BLD-SCNC: 3.7 MMOL/L (ref 3.5–5.2)
RBC # BLD AUTO: 3.11 10*6/MM3 (ref 3.77–5.28)
SODIUM BLD-SCNC: 134 MMOL/L (ref 136–145)
WBC NRBC COR # BLD: 11.32 10*3/MM3 (ref 3.4–10.8)

## 2019-12-30 PROCEDURE — 25010000002 DEXAMETHASONE SODIUM PHOSPHATE 10 MG/ML SOLUTION: Performed by: NURSE ANESTHETIST, CERTIFIED REGISTERED

## 2019-12-30 PROCEDURE — 25010000002 ROPIVACAINE PER 1 MG: Performed by: NURSE ANESTHETIST, CERTIFIED REGISTERED

## 2019-12-30 PROCEDURE — 80048 BASIC METABOLIC PNL TOTAL CA: CPT | Performed by: INTERNAL MEDICINE

## 2019-12-30 PROCEDURE — 25010000002 MORPHINE PER 10 MG: Performed by: INTERNAL MEDICINE

## 2019-12-30 PROCEDURE — 85025 COMPLETE CBC W/AUTO DIFF WBC: CPT | Performed by: INTERNAL MEDICINE

## 2019-12-30 PROCEDURE — 94640 AIRWAY INHALATION TREATMENT: CPT

## 2019-12-30 PROCEDURE — 99232 SBSQ HOSP IP/OBS MODERATE 35: CPT | Performed by: INTERNAL MEDICINE

## 2019-12-30 PROCEDURE — 25010000002 ONDANSETRON PER 1 MG: Performed by: ORTHOPAEDIC SURGERY

## 2019-12-30 PROCEDURE — 97530 THERAPEUTIC ACTIVITIES: CPT | Performed by: PHYSICAL THERAPIST

## 2019-12-30 RX ORDER — LORAZEPAM 0.5 MG/1
0.5 TABLET ORAL NIGHTLY PRN
Status: DISCONTINUED | OUTPATIENT
Start: 2019-12-30 | End: 2020-01-01 | Stop reason: HOSPADM

## 2019-12-30 RX ORDER — HYDROCODONE BITARTRATE AND ACETAMINOPHEN 5; 325 MG/1; MG/1
1 TABLET ORAL EVERY 6 HOURS PRN
Status: DISCONTINUED | OUTPATIENT
Start: 2019-12-30 | End: 2019-12-30

## 2019-12-30 RX ORDER — BUPIVACAINE HYDROCHLORIDE 2.5 MG/ML
INJECTION, SOLUTION EPIDURAL; INFILTRATION; INTRACAUDAL
Status: COMPLETED | OUTPATIENT
Start: 2019-12-30 | End: 2019-12-30

## 2019-12-30 RX ORDER — ROPIVACAINE HYDROCHLORIDE 2 MG/ML
6 INJECTION, SOLUTION EPIDURAL; INFILTRATION CONTINUOUS
Status: DISCONTINUED | OUTPATIENT
Start: 2019-12-30 | End: 2020-01-01 | Stop reason: HOSPADM

## 2019-12-30 RX ORDER — MORPHINE SULFATE 2 MG/ML
2 INJECTION, SOLUTION INTRAMUSCULAR; INTRAVENOUS
Status: DISCONTINUED | OUTPATIENT
Start: 2019-12-30 | End: 2020-01-01 | Stop reason: HOSPADM

## 2019-12-30 RX ORDER — HYDROCODONE BITARTRATE AND ACETAMINOPHEN 10; 325 MG/1; MG/1
1 TABLET ORAL EVERY 4 HOURS PRN
Status: DISCONTINUED | OUTPATIENT
Start: 2019-12-30 | End: 2020-01-01 | Stop reason: HOSPADM

## 2019-12-30 RX ORDER — DEXAMETHASONE SODIUM PHOSPHATE 10 MG/ML
INJECTION, SOLUTION INTRAMUSCULAR; INTRAVENOUS
Status: COMPLETED | OUTPATIENT
Start: 2019-12-30 | End: 2019-12-30

## 2019-12-30 RX ADMIN — BUPIVACAINE HYDROCHLORIDE 20 ML: 2.5 INJECTION, SOLUTION EPIDURAL; INFILTRATION; INTRACAUDAL; PERINEURAL at 10:20

## 2019-12-30 RX ADMIN — QUETIAPINE FUMARATE 100 MG: 100 TABLET ORAL at 20:28

## 2019-12-30 RX ADMIN — DEXAMETHASONE SODIUM PHOSPHATE 2 MG: 10 INJECTION, SOLUTION INTRAMUSCULAR; INTRAVENOUS at 10:20

## 2019-12-30 RX ADMIN — MORPHINE SULFATE: 1 INJECTION INTRAVENOUS at 05:47

## 2019-12-30 RX ADMIN — IPRATROPIUM BROMIDE AND ALBUTEROL SULFATE 3 ML: 2.5; .5 SOLUTION RESPIRATORY (INHALATION) at 07:17

## 2019-12-30 RX ADMIN — MORPHINE SULFATE: 1 INJECTION INTRAVENOUS at 14:58

## 2019-12-30 RX ADMIN — ONDANSETRON 4 MG: 2 INJECTION INTRAMUSCULAR; INTRAVENOUS at 18:24

## 2019-12-30 RX ADMIN — OXCARBAZEPINE 300 MG: 150 TABLET, FILM COATED ORAL at 20:32

## 2019-12-30 RX ADMIN — TIZANIDINE 4 MG: 4 TABLET ORAL at 20:28

## 2019-12-30 RX ADMIN — MORPHINE SULFATE: 1 INJECTION INTRAVENOUS at 00:56

## 2019-12-30 RX ADMIN — BUPIVACAINE HYDROCHLORIDE 25 ML: 2.5 INJECTION, SOLUTION EPIDURAL; INFILTRATION; INTRACAUDAL at 10:40

## 2019-12-30 RX ADMIN — HYDROCODONE BITARTRATE AND ACETAMINOPHEN 1 TABLET: 10; 325 TABLET ORAL at 20:28

## 2019-12-30 RX ADMIN — MELATONIN TAB 5 MG 5 MG: 5 TAB at 05:20

## 2019-12-30 RX ADMIN — LORAZEPAM 0.5 MG: 0.5 TABLET ORAL at 20:28

## 2019-12-30 RX ADMIN — OXCARBAZEPINE 300 MG: 150 TABLET, FILM COATED ORAL at 09:49

## 2019-12-30 RX ADMIN — ROPIVACAINE HYDROCHLORIDE 6 ML/HR: 2 INJECTION, SOLUTION EPIDURAL; INFILTRATION at 12:15

## 2019-12-30 RX ADMIN — ASPIRIN 325 MG: 325 TABLET, COATED ORAL at 09:49

## 2019-12-31 PROCEDURE — 97116 GAIT TRAINING THERAPY: CPT | Performed by: PHYSICAL THERAPIST

## 2019-12-31 PROCEDURE — 94799 UNLISTED PULMONARY SVC/PX: CPT

## 2019-12-31 PROCEDURE — 25010000002 ROPIVACAINE PER 1 MG: Performed by: NURSE ANESTHETIST, CERTIFIED REGISTERED

## 2019-12-31 PROCEDURE — 99239 HOSP IP/OBS DSCHRG MGMT >30: CPT | Performed by: INTERNAL MEDICINE

## 2019-12-31 RX ORDER — ROPIVACAINE HYDROCHLORIDE 2 MG/ML
6 INJECTION, SOLUTION EPIDURAL; INFILTRATION CONTINUOUS
Start: 2019-12-31 | End: 2020-01-04

## 2019-12-31 RX ORDER — BISACODYL 10 MG
10 SUPPOSITORY, RECTAL RECTAL DAILY PRN
Start: 2019-12-31 | End: 2020-11-02

## 2019-12-31 RX ORDER — ACETAMINOPHEN 325 MG/1
325 TABLET ORAL EVERY 4 HOURS PRN
Start: 2019-12-31 | End: 2020-11-02

## 2019-12-31 RX ORDER — CHOLECALCIFEROL (VITAMIN D3) 125 MCG
5 CAPSULE ORAL NIGHTLY
Start: 2019-12-31 | End: 2020-11-02

## 2019-12-31 RX ADMIN — IPRATROPIUM BROMIDE AND ALBUTEROL SULFATE 3 ML: 2.5; .5 SOLUTION RESPIRATORY (INHALATION) at 19:23

## 2019-12-31 RX ADMIN — OXCARBAZEPINE 300 MG: 150 TABLET, FILM COATED ORAL at 20:37

## 2019-12-31 RX ADMIN — TIZANIDINE 4 MG: 4 TABLET ORAL at 20:37

## 2019-12-31 RX ADMIN — IPRATROPIUM BROMIDE AND ALBUTEROL SULFATE 3 ML: 2.5; .5 SOLUTION RESPIRATORY (INHALATION) at 12:12

## 2019-12-31 RX ADMIN — ROPIVACAINE HYDROCHLORIDE 6 ML/HR: 2 INJECTION, SOLUTION EPIDURAL; INFILTRATION at 02:50

## 2019-12-31 RX ADMIN — LORAZEPAM 0.5 MG: 0.5 TABLET ORAL at 20:37

## 2019-12-31 RX ADMIN — IPRATROPIUM BROMIDE AND ALBUTEROL SULFATE 3 ML: 2.5; .5 SOLUTION RESPIRATORY (INHALATION) at 07:28

## 2019-12-31 RX ADMIN — HYDROCODONE BITARTRATE AND ACETAMINOPHEN 1 TABLET: 10; 325 TABLET ORAL at 12:53

## 2019-12-31 RX ADMIN — ROPIVACAINE HYDROCHLORIDE 6 ML/HR: 2 INJECTION, SOLUTION EPIDURAL; INFILTRATION at 22:18

## 2019-12-31 RX ADMIN — OXCARBAZEPINE 300 MG: 150 TABLET, FILM COATED ORAL at 08:18

## 2019-12-31 RX ADMIN — ASPIRIN 325 MG: 325 TABLET, COATED ORAL at 08:18

## 2019-12-31 RX ADMIN — IPRATROPIUM BROMIDE AND ALBUTEROL SULFATE 3 ML: 2.5; .5 SOLUTION RESPIRATORY (INHALATION) at 16:18

## 2019-12-31 RX ADMIN — HYDROCODONE BITARTRATE AND ACETAMINOPHEN 1 TABLET: 10; 325 TABLET ORAL at 17:29

## 2019-12-31 RX ADMIN — HYDROCODONE BITARTRATE AND ACETAMINOPHEN 1 TABLET: 10; 325 TABLET ORAL at 08:28

## 2019-12-31 RX ADMIN — SODIUM CHLORIDE, PRESERVATIVE FREE 10 ML: 5 INJECTION INTRAVENOUS at 20:37

## 2019-12-31 RX ADMIN — MAGNESIUM HYDROXIDE 10 ML: 2400 SUSPENSION ORAL at 08:28

## 2019-12-31 RX ADMIN — QUETIAPINE FUMARATE 100 MG: 100 TABLET ORAL at 20:38

## 2020-01-01 VITALS
TEMPERATURE: 98.5 F | HEART RATE: 85 BPM | DIASTOLIC BLOOD PRESSURE: 84 MMHG | OXYGEN SATURATION: 94 % | SYSTOLIC BLOOD PRESSURE: 155 MMHG | WEIGHT: 116 LBS | BODY MASS INDEX: 21.35 KG/M2 | HEIGHT: 62 IN | RESPIRATION RATE: 16 BRPM

## 2020-01-01 LAB
ANION GAP SERPL CALCULATED.3IONS-SCNC: 12 MMOL/L (ref 5–15)
BUN BLD-MCNC: 14 MG/DL (ref 8–23)
BUN/CREAT SERPL: 20 (ref 7–25)
CALCIUM SPEC-SCNC: 9.9 MG/DL (ref 8.6–10.5)
CHLORIDE SERPL-SCNC: 92 MMOL/L (ref 98–107)
CO2 SERPL-SCNC: 27 MMOL/L (ref 22–29)
CREAT BLD-MCNC: 0.7 MG/DL (ref 0.57–1)
GFR SERPL CREATININE-BSD FRML MDRD: 82 ML/MIN/1.73
GLUCOSE BLD-MCNC: 115 MG/DL (ref 65–99)
POTASSIUM BLD-SCNC: 4.4 MMOL/L (ref 3.5–5.2)
SODIUM BLD-SCNC: 131 MMOL/L (ref 136–145)

## 2020-01-01 PROCEDURE — 80048 BASIC METABOLIC PNL TOTAL CA: CPT | Performed by: INTERNAL MEDICINE

## 2020-01-01 PROCEDURE — 99232 SBSQ HOSP IP/OBS MODERATE 35: CPT | Performed by: INTERNAL MEDICINE

## 2020-01-01 PROCEDURE — 97110 THERAPEUTIC EXERCISES: CPT

## 2020-01-01 PROCEDURE — 97116 GAIT TRAINING THERAPY: CPT

## 2020-01-01 PROCEDURE — 94799 UNLISTED PULMONARY SVC/PX: CPT

## 2020-01-01 RX ADMIN — LORAZEPAM 0.5 MG: 0.5 TABLET ORAL at 09:20

## 2020-01-01 RX ADMIN — HYDROCODONE BITARTRATE AND ACETAMINOPHEN 1 TABLET: 10; 325 TABLET ORAL at 00:57

## 2020-01-01 RX ADMIN — SODIUM CHLORIDE, PRESERVATIVE FREE 10 ML: 5 INJECTION INTRAVENOUS at 08:18

## 2020-01-01 RX ADMIN — ASPIRIN 325 MG: 325 TABLET, COATED ORAL at 08:18

## 2020-01-01 RX ADMIN — IPRATROPIUM BROMIDE AND ALBUTEROL SULFATE 3 ML: 2.5; .5 SOLUTION RESPIRATORY (INHALATION) at 06:40

## 2020-01-01 RX ADMIN — IPRATROPIUM BROMIDE AND ALBUTEROL SULFATE 3 ML: 2.5; .5 SOLUTION RESPIRATORY (INHALATION) at 11:42

## 2020-01-01 RX ADMIN — OXCARBAZEPINE 300 MG: 150 TABLET, FILM COATED ORAL at 08:18

## 2020-01-01 NOTE — PROGRESS NOTES
"/84 (BP Location: Left arm, Patient Position: Lying)   Pulse 74   Temp 98.5 °F (36.9 °C) (Oral)   Resp 16   Ht 157.5 cm (62\")   Wt 52.6 kg (116 lb)   SpO2 94%   Breastfeeding No   BMI 21.22 kg/m²     Lab Results (last 24 hours)     Procedure Component Value Units Date/Time    Basic Metabolic Panel [[id number]]  (Abnormal) Collected:  01/01/20 0506    Specimen:  Blood Updated:  01/01/20 0635     Glucose 115 mg/dL      BUN 14 mg/dL      Creatinine 0.70 mg/dL      Sodium 131 mmol/L      Potassium 4.4 mmol/L      Chloride 92 mmol/L      CO2 27.0 mmol/L      Calcium 9.9 mg/dL      eGFR Non African Amer 82 mL/min/1.73      BUN/Creatinine Ratio 20.0     Anion Gap 12.0 mmol/L     Narrative:       GFR Normal >60  Chronic Kidney Disease <60  Kidney Failure <15            Imaging Results (Last 24 Hours)     ** No results found for the last 24 hours. **          Patient Care Team:  Griselda Holden MD as PCP - General (Family Medicine)  Taty Montano DNP, APRN as Nurse Practitioner (Family Medicine)  Justin Cardoso MD as Consulting Physician (Ophthalmology)  Angi Rutledge MD (Gastroenterology)  Justin Parrish MD as Consulting Physician (Dermatology)  Derrick Harding II, MD (Pain Medicine)    SUBJECTIVE: She was anxious this morning but is feeling better now.  Reports pain is well controlled.  Had trouble sleeping last night.  She did have a BM.  She is ready to go to rehab today.    PHYSICAL EXAM  Left short leg splint is clean, dry and intact  Sensation intact light touch and able to wiggle toes  Brisk capillary refill         Closed trimalleolar fracture, left, initial encounter    Essential hypertension    CKD (chronic kidney disease) stage 2, GFR 60-89 ml/min    MS (multiple sclerosis) (CMS/HCC)    Hepatitis C antibody test positive    Neuropathic pain    GERD without esophagitis    Hyperglycemia    History of tobacco use      PLAN / DISPOSITION: 69-year-old female postop day #4 " status post ORIF left ankle fracture     -She is ready to go to rehab today.  -Continue to mobilize with therapy with strict nonweightbearing left lower extremity.  PT/OT have recommended inpatient rehab upon discharge and the patient is agreeable to this plan.  She has significant weakness at baseline with her MS.  -Aspirin for DVT prophylaxis  -Okay with me to transfer to [facility] today.  -Follow-up in 2 weeks       Stan Lozoya MD  01/01/20  11:07 AM

## 2020-01-01 NOTE — PLAN OF CARE
Problem: Patient Care Overview  Goal: Plan of Care Review  Flowsheets (Taken 1/1/2020 0428)  Progress: improving  Plan of Care Reviewed With: patient  Outcome Summary: Pt VSS. Pt with minimal c/o pain controlled with PO pain meds and Ropivicaine infusing per order. Splint in place to left ankle. Warm toes and wiggling, no numbness to toes. Probable DC to [facility] today. Rested well.     Problem: Fall Risk (Adult)  Goal: Identify Related Risk Factors and Signs and Symptoms  Flowsheets (Taken 1/1/2020 0428)  Related Risk Factors (Fall Risk): fear of falling; gait/mobility problems; history of falls  Signs and Symptoms (Fall Risk): presence of risk factors  Goal: Absence of Fall  Flowsheets (Taken 1/1/2020 0428)  Absence of Fall: making progress toward outcome     Problem: Pain, Chronic (Adult)  Goal: Identify Related Risk Factors and Signs and Symptoms  Flowsheets (Taken 1/1/2020 0428)  Related Risk Factors (Chronic Pain): surgery  Signs and Symptoms (Chronic Pain): verbalization of pain descriptors  Goal: Acceptable Pain/Comfort Level and Functional Ability  Flowsheets (Taken 1/1/2020 0428)  Acceptable Pain/Comfort Level and Functional Ability: making progress toward outcome     Problem: Skin Injury Risk (Adult)  Goal: Identify Related Risk Factors and Signs and Symptoms  Flowsheets (Taken 1/1/2020 0428)  Related Risk Factors (Skin Injury Risk): mobility impaired  Goal: Skin Health and Integrity  Flowsheets (Taken 1/1/2020 0428)  Skin Health and Integrity: making progress toward outcome     Problem: Hypertensive Disease/Crisis (Arterial) (Adult)  Goal: Signs and Symptoms of Listed Potential Problems Will be Absent, Minimized or Managed (Hypertensive Disease/Crisis)  Flowsheets (Taken 1/1/2020 0428)  Problems Assessed (Hypertensive Disease/Crisis (Arterial)): all  Problems Present (Hypertensive Disease): situational response     Problem: Fracture Orthopaedic (Adult)  Goal: Signs and Symptoms of Listed Potential Problems  Will be Absent, Minimized or Managed (Fracture Orthopaedic)  Flowsheets (Taken 1/1/2020 9771)  Problems Assessed (Orthopaedic Fracture): all  Problems Present (Orthopaedic Fracture): functional deficit/self-care deficit; pain

## 2020-01-01 NOTE — PROGRESS NOTES
Continued Stay Note  [facility]     Patient Name: Angela Diaz Parent  MRN: [id number]  Today's Date: 1/1/2020    Admit Date: 12/27/2019    Discharge Plan     Row Name 01/01/20 0844       Plan    Plan  [facility]    Plan Comments  Spoke with Tanisha at [facility] this morning and confirmed that patient is able to return to [facility] today/GRU.  Son to transport.          Discharge Codes    No documentation.       Expected Discharge Date and Time     Expected Discharge Date Expected Discharge Time    Dec 31, 2019             Candida José RN

## 2020-01-01 NOTE — THERAPY DISCHARGE NOTE
Patient Name: Angela Diaz Parent  : 1950    MRN: [id number]                              Today's Date: 2020       Admit Date: 2019    Visit Dx:     ICD-10-CM ICD-9-CM   1. Closed trimalleolar fracture of left ankle, initial encounter S82.852A 824.6   2. Fall, initial encounter W19.XXXA E888.9   3. Impaired mobility and ADLs Z74.09 799.89     Patient Active Problem List   Diagnosis   • Essential hypertension   • Palpitations   • Other forms of angina pectoris (CMS/HCC)    • CKD (chronic kidney disease) stage 2, GFR 60-89 ml/min   • MS (multiple sclerosis) (CMS/HCC)   • Mild aortic regurgitation   • Chronic constipation   • MINDI (generalized anxiety disorder)   • Polyp of colon   • Hepatitis C antibody test positive   • Neuropathic pain   • Prediabetes   • GERD without esophagitis   • Hyperglycemia   • Closed trimalleolar fracture, left, initial encounter   • History of tobacco use     Past Medical History:   Diagnosis Date   • Acid reflux    • Anxiety    • Arthritis    • Cataract    • CKD (chronic kidney disease) stage 2, GFR 60-89 ml/min 2018   • Colon polyp    • Depression    • GERD (gastroesophageal reflux disease)    • Headache    • Heart murmur    • History of blood transfusion     after surgery    • Hypertension    • Mild aortic regurgitation 2019   • MS (multiple sclerosis) (CMS/HCC)    • Pneumonia    • Prediabetes 2019     Past Surgical History:   Procedure Laterality Date   • CATARACT EXTRACTION     • CERVICAL BIOPSY      benign   • HEMORRHOIDECTOMY     • TUBAL ABDOMINAL LIGATION       General Information     Row Name 20 1000          PT Evaluation Time/Intention    Document Type  therapy note (daily note);discharge evaluation/summary  -MB     Mode of Treatment  physical therapy  -MB     Row Name 20 1000          General Information    Patient Profile Reviewed?  yes  -MB     Existing Precautions/Restrictions  fall;left;non-weight bearing;other  (see comments) L adductor canal nerve catheter  -MB     Row Name 01/01/20 1000          Cognitive Assessment/Intervention- PT/OT    Orientation Status (Cognition)  oriented x 4  -MB     Row Name 01/01/20 1000          Safety Issues, Functional Mobility    Safety Issues Affecting Function (Mobility)  ability to follow commands;awareness of need for assistance;insight into deficits/self awareness;judgment;positioning of assistive device;safety precaution awareness;safety precautions follow-through/compliance  -MB     Impairments Affecting Function (Mobility)  balance;endurance/activity tolerance;pain;range of motion (ROM);strength  -MB       User Key  (r) = Recorded By, (t) = Taken By, (c) = Cosigned By    Initials Name Provider Type    MB Lea Mark, PT Physical Therapist        Mobility     Row Name 01/01/20 1000          Bed Mobility Assessment/Treatment    Supine-Sit Esmeralda (Bed Mobility)  conditional independence  -MB     Sit-Supine Esmeralda (Bed Mobility)  not tested  -MB     Assistive Device (Bed Mobility)  bed rails  -MB     Comment (Bed Mobility)  Pt. advanced to EOB w/ appropriate safety and sequencing.   -MB     Row Name 01/01/20 1000          Transfer Assessment/Treatment    Comment (Transfers)  STS from EOB, recliner w/ VCs for safe hand placement and to maintain NWB LLE.  Pt. required assist to set up knee scooter and lock brakes.    -MB     Row Name 01/01/20 1000          Bed-Chair Transfer    Bed-Chair Esmeralda (Transfers)  stand by assist;verbal cues  -MB     Assistive Device (Bed-Chair Transfers)  walker, front-wheeled  -MB     Row Name 01/01/20 1000          Sit-Stand Transfer    Sit-Stand Esmeralda (Transfers)  stand by assist;verbal cues  -MB     Assistive Device (Sit-Stand Transfers)  walker, front-wheeled  -MB     Row Name 01/01/20 1000          Gait/Stairs Assessment/Training    Gait/Stairs Assessment/Training  gait/ambulation independence;gait/ambulation assistive  device;distance ambulated;gait pattern  -MB     [place] Level (Gait)  contact guard;verbal cues  -MB     Assistive Device (Gait)  walker, front-wheeled;walker, knee scooter  -MB     Distance in Feet (Gait)  5' (RW), 100' (knee scooter)  -MB     Pattern (Gait)  swing-to  -MB     Deviations/Abnormal Patterns (Gait)  jv decreased  -MB     Bilateral Gait Deviations  forward flexed posture  -MB     Comment (Gait/Stairs)  Pt. ambulated to chair w/ RW, NWB LLE w/ cues for step sequence.  Pt. ambulated 100ft w/ knee scooter w/ consistent VCs for upright posture and safety during direction changes.  Pt. denied dizziness throughout.    -MB     Row Name 01/01/20 1000          Mobility Assessment/Intervention    Extremity Weight-bearing Status  left lower extremity  -MB     Left Lower Extremity (Weight-bearing Status)  (S) non weight-bearing (NWB)  -MB       User Key  (r) = Recorded By, (t) = Taken By, (c) = Cosigned By    Initials Name Provider Type    Lea Baldwin, PT Physical Therapist        Obj/Interventions     Row Name 01/01/20 1000          Therapeutic Exercise    Upper Extremity Range of Motion (Therapeutic Exercise)  shoulder flexion/extension, bilateral;shoulder abduction/adduction, bilateral  -MB     Lower Extremity (Therapeutic Exercise)  LAQ (long arc quad), bilateral;gluteal sets;quad sets, bilateral  -MB     Lower Extremity Range of Motion (Therapeutic Exercise)  ankle dorsiflexion/plantar flexion, right  -MB     Exercise Type (Therapeutic Exercise)  isometric contraction, static  -MB     Sets/Reps (Therapeutic Exercise)  1/10  -MB     Row Name 01/01/20 1000          Static Standing Balance    Level of [place] (Supported Standing, Static Balance)  contact guard assist  -MB     Time Able to Maintain Position (Supported Standing, Static Balance)  1 to 2 minutes  -MB     Assistive Device Utilized (Supported Standing, Static Balance)  walker, rolling  -MB     Row Name 01/01/20 1000           Dynamic Standing Balance    Level of [place], Reaches Outside Midline (Standing, Dynamic Balance)  contact guard assist  -MB     Time Able to Maintain Position, Reaches Outside Midline (Standing, Dynamic Balance)  4 to 5 minutes  -MB     Assistive Device Utilized (Supported Standing, Dynamic Balance)  walker, rolling knee scooter  -MB       User Key  (r) = Recorded By, (t) = Taken By, (c) = Cosigned By    Initials Name Provider Type    Lea Baldwin, PT Physical Therapist        Goals/Plan     Row Name 01/01/20 1000          Bed Mobility Goal 1 (PT)    Activity/Assistive Device (Bed Mobility Goal 1, PT)  sit to supine;supine to sit  -MB     [place] Level/Cues Needed (Bed Mobility Goal 1, PT)  independent  -MB     Time Frame (Bed Mobility Goal 1, PT)  long term goal (LTG);1 week  -MB     Progress/Outcomes (Bed Mobility Goal 1, PT)  goal met  -MB     Row Name 01/01/20 1000          Transfer Goal 1 (PT)    Activity/Assistive Device (Transfer Goal 1, PT)  sit-to-stand/stand-to-sit  -MB     [place] Level/Cues Needed (Transfer Goal 1, PT)  conditional independence  -MB     Time Frame (Transfer Goal 1, PT)  long term goal (LTG);10 days  -MB     Progress/Outcome (Transfer Goal 1, PT)  good progress toward goal;goal partially met;discharged from facility  -MB     Row Name 01/01/20 1000          Gait Training Goal 1 (PT)    Activity/Assistive Device (Gait Training Goal 1, PT)  assistive device use  -MB     [place] Level (Gait Training Goal 1, PT)  conditional independence  -MB     Distance (Gait Goal 1, PT)  150  -MB     Time Frame (Gait Training Goal 1, PT)  long term goal (LTG);10 days  -MB     Progress/Outcome (Gait Training Goal 1, PT)  continuing progress toward goal;discharged from facility;goal partially met  -MB       User Key  (r) = Recorded By, (t) = Taken By, (c) = Cosigned By    Initials Name Provider Type    Lea Baldwin PT Physical Therapist        Clinical Impression      Row Name 01/01/20 1000          Pain Scale: Numbers Pre/Post-Treatment    Pain Scale: Numbers, Pretreatment  4/10  -MB     Pain Scale: Numbers, Post-Treatment  4/10  -MB     Pain Location - Side  Left  -MB     Pain Location - Orientation  incisional  -MB     Pain Location  ankle  -MB     Pain Intervention(s)  Medication (See MAR);Ambulation/increased activity;Repositioned  -MB     Row Name 01/01/20 1000          Plan of Care Review    Plan of Care Reviewed With  patient  -MB     Progress  improving  -MB     Outcome Summary  Pt. demo. improved independence w/ mobility; however, continues to require cues for safety and maintaining NWB LLE.  Pt. performed transfers (w/ RW, knee scooter) w/ SBA and ambulated w/ 100ft w/ knee scooter w/ CGA.  Anticipate D/C to [facility] later today.   -MB     Row Name 01/01/20 1000          Vital Signs    Pre Systolic BP Rehab  155  -MB     Pre Treatment Diastolic BP  84  -MB     Intra Systolic BP Rehab  152  -MB     Intra Treatment Diastolic BP  79  -MB     O2 Delivery Pre Treatment  room air  -MB     O2 Delivery Post Treatment  room air  -MB     Pre Patient Position  Supine  -MB     Intra Patient Position  Standing  -MB     Post Patient Position  Sitting  -MB     Row Name 01/01/20 1000          Positioning and Restraints    Pre-Treatment Position  in bed  -MB     Post Treatment Position  chair  -MB     In Chair  notified nsg;reclined;call light within reach;encouraged to call for assist;exit alarm on;legs elevated  -MB       User Key  (r) = Recorded By, (t) = Taken By, (c) = Cosigned By    Initials Name Provider Type    Lea Baldwin, PT Physical Therapist        Outcome Measures     Row Name 01/01/20 1000          How much help from another person do you currently need...    Turning from your back to your side while in flat bed without using bedrails?  4  -MB     Moving from lying on back to sitting on the side of a flat bed without bedrails?  3  -MB     Moving to and from a bed  to a chair (including a wheelchair)?  3  -MB     Standing up from a chair using your arms (e.g., wheelchair, bedside chair)?  3  -MB     Climbing 3-5 steps with a railing?  1  -MB     To walk in hospital room?  3  -MB     AM-PAC 6 Clicks Score (PT)  17  -MB       User Key  (r) = Recorded By, (t) = Taken By, (c) = Cosigned By    Initials Name Provider Type    Lea Baldwin, PT Physical Therapist          PT Recommendation and Plan     Plan of Care Reviewed With: patient  Progress: improving  Outcome Summary: Pt. demo. improved independence w/ mobility; however, continues to require cues for safety and maintaining NWB LLE.  Pt. performed transfers (w/ RW, knee scooter) w/ SBA and ambulated w/ 100ft w/ knee scooter w/ CGA.  Anticipate D/C to [facility] later today.      Time Calculation:   PT Charges     Row Name 01/01/20 1535             Time Calculation    Start Time  1000  -MB      PT Received On  01/01/20  -MB      PT Goal Re-Cert Due Date  01/08/20  -MB         Time Calculation- PT    Total Timed Code Minutes- PT  35 minute(s)  -MB         Timed Charges    34738 - PT Therapeutic Exercise Minutes  15  -MB      66957 - Gait Training Minutes   20  -MB        User Key  (r) = Recorded By, (t) = Taken By, (c) = Cosigned By    Initials Name Provider Type    Lea Baldwin, PT Physical Therapist        Therapy Charges for Today     Code Description Service Date Service Provider Modifiers Qty    33139920664 HC PT THER PROC EA 15 MIN 1/1/2020 Lea Mark, PT GP 1    [id number] HC GAIT TRAINING EA 15 MIN 1/1/2020 Lea Mark, PT GP 1          PT G-Codes  Outcome Measure Options: AM-PAC 6 Clicks Basic Mobility (PT)  AM-PAC 6 Clicks Score (PT): 17  AM-PAC 6 Clicks Score (OT): 19         Lea Mark PT  1/1/2020

## 2020-01-01 NOTE — PROGRESS NOTES
[facility] Medicine Services  PROGRESS NOTE    Patient Name: Angela Diaz Parent  : 1950  MRN: [id number]    Date of Admission: 2019  Primary Care Physician: Griselda Holden MD    Subjective   Subjective     CC: f/u ankle fx    HPI: Patient's discharge postponed due to not having bm. Had BM last night and now wants to go to rehab. Anxious this morning. Still not sleeping well.    Review of Systems  Gen- No fevers, chills  CV- No chest pain, palpitations  Resp- No cough, dyspnea  GI- No N/V/D, abd pain    Objective   Objective     Vital Signs:   Temp:  [98.1 °F (36.7 °C)-98.5 °F (36.9 °C)] 98.5 °F (36.9 °C)  Heart Rate:  [71-79] 74  Resp:  [16-20] 16  BP: (135-170)/(70-87) 155/84        Physical Exam:  Constitutional: No acute distress, awake, alert, chronically ill appearing  HENT: NCAT, mucous membranes moist  Respiratory: Clear to auscultation bilaterally, respiratory effort normal   Cardiovascular: RRR, no murmurs, rubs, or gallops, palpable pedal pulses bilaterally  Gastrointestinal: Positive bowel sounds, soft, nontender, nondistended  Musculoskeletal: Left leg dressed with dressing c/d/i.  Psychiatric: Anxious  Neurologic: Oriented x 3, strength symmetric in all extremities, Cranial Nerves grossly intact to confrontation, speech clear  Skin: No rashes    Results Reviewed:    Results from last 7 days   Lab Units 19  0559 19  0619  0014   WBC 10*3/mm3 11.32* 12.20* 7.69   HEMOGLOBIN g/dL 10.2* 9.9* 11.5*   HEMATOCRIT % 31.1* 30.4* 34.5   PLATELETS 10*3/mm3 298 292 303   INR   --   --  1.05     Results from last 7 days   Lab Units 20  0506 19  0559 19  0612 19  0014   SODIUM mmol/L 131* 134* 135* 131*   POTASSIUM mmol/L 4.4 3.7 3.5 3.7   CHLORIDE mmol/L 92* 97* 100 93*   CO2 mmol/L 27.0 26.0 22.0 26.0   BUN mg/dL 14 7* 11 15   CREATININE mg/dL 0.70 0.65 0.79 0.89   GLUCOSE mg/dL 115* 115* 106* 251*   CALCIUM mg/dL  9.9 9.2 9.3 8.8   ALT (SGPT) U/L  --   --   --  9   AST (SGOT) U/L  --   --   --  15     Estimated Creatinine Clearance: 53.6 mL/min (by C-G formula based on SCr of 0.7 mg/dL).    Microbiology Results Abnormal     Procedure Component Value - Date/Time    Urine Culture - Urine, Urine, Clean Catch [[id number]] Collected:  12/28/19 0450    Lab Status:  Final result Specimen:  Urine, Clean Catch Updated:  12/29/19 1056     Urine Culture <25,000 CFU/mL Mixed Frankie Isolated    Narrative:       Specimen contains mixed organisms of questionable pathogenicity which indicates contamination with commensal frankie.  Further identification is unlikely to provide clinically useful information.  Suggest recollection.          Imaging Results (Last 24 Hours)     ** No results found for the last 24 hours. **          Results for orders placed during the hospital encounter of 01/03/19   Adult Transthoracic Echo Complete W/ Cont if Necessary Per Protocol    Narrative · Left ventricular systolic function is normal.  · Estimated EF appears to be in the range of 61 - 65%.  · Mild aortic valve regurgitation is present.          I have reviewed the medications:  Scheduled Meds:  aspirin 325 mg Oral Daily   ipratropium-albuterol 3 mL Nebulization 4x Daily - RT   OXcarbazepine 300 mg Oral BID   QUEtiapine 100 mg Oral Nightly   sodium chloride 10 mL Intravenous Q12H     Continuous Infusions:  lactated ringers 9 mL/hr Last Rate: Stopped (12/28/19 0817)   ropivacaine (NAROPIN) 0.2% peripheral nerve cath (moog) 6 mL/hr Last Rate: 6 mL/hr (12/31/19 2218)   sodium chloride 75 mL/hr      PRN Meds:.•  acetaminophen **OR** acetaminophen  •  acetaminophen  •  bisacodyl  •  diphenhydrAMINE **OR** diphenhydrAMINE  •  diphenhydrAMINE  •  HYDROcodone-acetaminophen  •  lactated ringers  •  LORazepam  •  magnesium hydroxide  •  melatonin  •  Morphine  •  naloxone  •  [DISCONTINUED] HYDROmorphone **AND** naloxone  •  ondansetron  •  ondansetron  •  sodium  chloride  •  tiZANidine      Assessment/Plan   Assessment & Plan     Active Hospital Problems    Diagnosis  POA   • **Closed trimalleolar fracture, left, initial encounter [S82.039C]  Yes   • History of tobacco use [Z87.891]  Not Applicable   • GERD without esophagitis [K21.9]  Yes   • Hyperglycemia [R73.9]  Yes   • Neuropathic pain [M79.2]  Yes   • Hepatitis C antibody test positive [R76.8]  Yes   • MS (multiple sclerosis) (CMS/HCC) [G35]  Yes   • Essential hypertension [I10]  Yes   • CKD (chronic kidney disease) stage 2, GFR 60-89 ml/min [N18.2]  Yes      Resolved Hospital Problems   No resolved problems to display.        Brief Hospital Course to date:  Angela Warren is a 69 y.o. female with a past medical history significant for MS with neuropathic pain, CKD, HTN, and anxiety/depression who presents s/p mechanical fall with left Trimalleolar ankle fracture.     A/P:  --Continue strict NWB LLE at d/c w/ ASA for dvt ppx, tolerating well. F/U Dr. Lozoya 2 weeks  --Pain currently controlled. Continue current pain regimen at d/c.  --Continue bowel regimen at d/c.   --Okay to d/c to [facility] today.    Disposition: I expect the patient to be discharged to [facility] today.    CODE STATUS:   Code Status and Medical Interventions:   Ordered at: 12/28/19 0240     Level Of Support Discussed With:    Patient     Code Status:    CPR     Medical Interventions (Level of Support Prior to Arrest):    Full         Electronically signed by Sandra Duarte II, DO, 01/01/20, 10:47 AM.

## 2020-01-01 NOTE — PROGRESS NOTES
[place]    Acute pain service Inpatient Progress Note    Patient Name: Angela Warren  :  1950  MRN:  [id number]        Acute Pain  Service Inpatient Progress Note:    Analgesia:Good  Pain Score:1/10  LOC: alert and awake  Resp Status: room air  Cardiac: VS stable  Side Effects:None  Catheter Site:clean, dressing intact and dry  Cath type: peripheral nerve cath with ON Q  Infusion rate: 6ml/hr  Catheter Plan:Catheter to remain Insitu and Continue catheter infusion rate unchanged  Comments: ---------------------------------------------------------------------------------  Physical Therapy Manual Muscle Testing Results:  MMT (Manual Muscle Testing)  General MMT Comments: RLE 5/5 DF/PF 4+/5 knee extension. LLE hip flexion/knee extension 2/5 with report of feeling of heaviness and burning in area of nerve block (19 0529)]    Physical Therapy - Plan of Care Review - Outcome Summary:  Outcome Summary: Pt able to amb 40' with knee scooter CGA with VC's on sequencing and maintaining upright posture as patient tends to want to lean on knee scooter. Pt limited with ambulation by dizziness. Continue to recommend inpatient rehab at discharge. ([phone])]    Occupational Therapy - Plan of Care Review - Outcome Summary:   ]  ----------------------------------------------------------------------------------

## 2020-01-01 NOTE — PROGRESS NOTES
Continued Stay Note  [facility]     Patient Name: Angela Diaz Parent  MRN: [id number]  Today's Date: 1/1/2020    Admit Date: 12/27/2019    Discharge Plan     Row Name 01/01/20 1203       Plan    Plan Comments  DC summary has been faxed to [phone] per .        Discharge Codes    No documentation.       Expected Discharge Date and Time     Expected Discharge Date Expected Discharge Time    Dec 31, 2019             Candida José RN

## 2020-01-01 NOTE — PLAN OF CARE
Problem: Patient Care Overview  Goal: Plan of Care Review  Flowsheets (Taken 1/1/2020 1000)  Outcome Summary: Pt. demo. improved independence w/ mobility; however, continues to require cues for safety and maintaining NWB LLE.  Pt. performed transfers (w/ RW, knee scooter) w/ SBA and ambulated w/ 100ft w/ knee scooter w/ CGA.  Anticipate D/C to [facility] later today.

## 2020-01-02 ENCOUNTER — READMISSION MANAGEMENT (OUTPATIENT)
Dept: CALL CENTER | Facility: HOSPITAL | Age: 70
End: 2020-01-02

## 2020-01-02 NOTE — OUTREACH NOTE
Prep Survey      Responses   Facility patient discharged from?  [place]   Is patient eligible?  No   What are the reasons patient is not eligible?  [facility]   Does the patient have one of the following disease processes/diagnoses(primary or secondary)?  Other   Prep survey completed?  Yes          Larisa Betancourt RN

## 2020-01-09 ENCOUNTER — TRANSITIONAL CARE MANAGEMENT TELEPHONE ENCOUNTER (OUTPATIENT)
Dept: FAMILY MEDICINE CLINIC | Facility: CLINIC | Age: 70
End: 2020-01-09

## 2020-01-10 NOTE — OUTREACH NOTE
Pt was dc'd from [facility] 1/8/20 s/p left ankle fracture repair secondary to a fall. Called pt for TCM. She states she's really sore all over from using a walker but getting a scooter today. The majority of this call was spent with pt discussing her desire for a new pcp and requests to stay within [facility]. I scheduled her a new patient appt w/ Emily Emerson 1/22 at [facility] since it's nearest to her home. Appt reminder will be mailed. Appts w/ Dr Symone Guajardo cancelled as requested. She denies any other questions or needs and voiced appreciation for the call.

## 2020-01-21 DIAGNOSIS — G89.29 CHRONIC LEFT SHOULDER PAIN: ICD-10-CM

## 2020-01-21 DIAGNOSIS — M25.512 CHRONIC LEFT SHOULDER PAIN: ICD-10-CM

## 2020-01-21 RX ORDER — NAPROXEN 500 MG/1
TABLET ORAL
Qty: 180 TABLET | Refills: 0 | Status: SHIPPED | OUTPATIENT
Start: 2020-01-21 | End: 2020-04-27

## 2020-02-28 ENCOUNTER — OFFICE VISIT (OUTPATIENT)
Dept: NEUROLOGY | Facility: CLINIC | Age: 70
End: 2020-02-28

## 2020-02-28 VITALS
DIASTOLIC BLOOD PRESSURE: 78 MMHG | OXYGEN SATURATION: 98 % | WEIGHT: 113.8 LBS | BODY MASS INDEX: 20.94 KG/M2 | HEART RATE: 99 BPM | HEIGHT: 62 IN | SYSTOLIC BLOOD PRESSURE: 132 MMHG

## 2020-02-28 DIAGNOSIS — G35 MS (MULTIPLE SCLEROSIS) (HCC): Primary | ICD-10-CM

## 2020-02-28 DIAGNOSIS — M79.2 NEUROPATHIC PAIN: ICD-10-CM

## 2020-02-28 DIAGNOSIS — I10 ESSENTIAL HYPERTENSION: ICD-10-CM

## 2020-02-28 PROCEDURE — 99214 OFFICE O/P EST MOD 30 MIN: CPT | Performed by: PSYCHIATRY & NEUROLOGY

## 2020-02-28 RX ORDER — TIZANIDINE 4 MG/1
4 TABLET ORAL 2 TIMES DAILY
Qty: 60 TABLET | Refills: 5 | Status: SHIPPED | OUTPATIENT
Start: 2020-02-28 | End: 2020-09-18

## 2020-02-28 RX ORDER — LOSARTAN POTASSIUM 25 MG/1
25 TABLET ORAL DAILY
Qty: 90 TABLET | Refills: 1 | Status: SHIPPED | OUTPATIENT
Start: 2020-02-28 | End: 2020-09-08

## 2020-02-28 RX ORDER — ALPRAZOLAM 1 MG/1
TABLET ORAL
COMMUNITY
Start: 2020-01-08 | End: 2020-02-28

## 2020-02-28 RX ORDER — QUETIAPINE FUMARATE 100 MG/1
100 TABLET, FILM COATED ORAL NIGHTLY
Qty: 30 TABLET | Refills: 6 | Status: SHIPPED | OUTPATIENT
Start: 2020-02-28 | End: 2020-05-11 | Stop reason: SDUPTHER

## 2020-02-28 NOTE — PROGRESS NOTES
Subjective   Patient ID: Angela Diaz Parent is a 69 y.o. female     Chief Complaint   Patient presents with   • Multiple Sclerosis     Follow Up         History of Present Illness    69 y.o. female returns in follow up for RRMS.  Last visit on 12/12/19 ordered labs, started OXC.    Admitted to [facility] 12/27/19 - 1/1/2020 for closed trimalleolar left peter fx.  Fell getting OOB.  Surgery Dr Lozoya.      Reports severe fatigue.     Started on OXC and flexor spasms have only occurred twice.     Feel better off Rebif.      Hep C positive but inactive.     Problem history:    Dx in 1997 previously followed by Dr Jensen and Taty Montano.   Treated with Rebif two different times for 5 years and off for a year then back on for 7 - 9 years.  Stopped Rebif two months ago.      Last MRI Brain 18 months ago.  Anxiety for small spaces.      Fatigue is severe.      Pain in heads with sharp shooting episodes.  Sits on head and puts them in hot water.  Movement worsens pain.  Sharp shooting pains down shins once a week.      Gait unsteady.      Attention and concentration decreasing.      Reviewed medical records:    Followed by pain management treated with narcotics.  Complains of pain in hands.  Taking Xanax for anxiety referred to Psychiatry.      Hep C ab reactive; quantitation not detected.     Past Medical History:   Diagnosis Date   • Acid reflux    • Anxiety    • Arthritis    • Cataract    • CKD (chronic kidney disease) stage 2, GFR 60-89 ml/min 2018   • Colon polyp    • Depression    • GERD (gastroesophageal reflux disease)    • Headache    • Heart murmur    • History of blood transfusion 1971    after surgery    • Hypertension 2018   • Mild aortic regurgitation 01/03/2019   • MS (multiple sclerosis) (CMS/[facility])    • Pneumonia    • Prediabetes 12/12/2019     Family History   Problem Relation Age of Onset   • Thyroid disease Mother    • Hyperlipidemia Sister    • Thyroid disease Sister    • Other Sister         parathyroid disease   •  "Hypertension Brother    • Migraines Brother    • Other Brother         kidney stones   • Other Niece         parathyroid disease   • Breast cancer Neg Hx    • Ovarian cancer Neg Hx      Social History     Socioeconomic History   • Marital status:      Spouse name: Not on file   • Number of children: Not on file   • Years of education: Not on file   • Highest education level: Not on file   Tobacco Use   • Smoking status: Former Smoker     Packs/day: 0.50     Types: Cigarettes     Last attempt to quit: 2019     Years since quittin.2   • Smokeless tobacco: Never Used   • Tobacco comment: \"trying to quit\"   Substance and Sexual Activity   • Alcohol use: No   • Drug use: No   • Sexual activity: Defer       Review of Systems   Constitutional: Positive for fatigue. Negative for activity change and unexpected weight change.   HENT: Negative for tinnitus and trouble swallowing.    Eyes: Negative for photophobia and visual disturbance.   Respiratory: Negative for apnea, cough and choking.    Cardiovascular: Negative for leg swelling.   Gastrointestinal: Negative for nausea and vomiting.   Endocrine: Negative for cold intolerance and heat intolerance.   Genitourinary: Negative for difficulty urinating, frequency, menstrual problem and urgency.   Musculoskeletal: Positive for arthralgias. Negative for back pain, gait problem, myalgias and neck pain.   Skin: Negative for color change and rash.   Allergic/Immunologic: Negative for immunocompromised state.   Neurological: Positive for weakness, numbness and headaches. Negative for dizziness, tremors, seizures, syncope, facial asymmetry, speech difficulty and light-headedness.   Hematological: Negative for adenopathy. Does not bruise/bleed easily.   Psychiatric/Behavioral: Positive for dysphoric mood. Negative for behavioral problems, confusion, decreased concentration, hallucinations and sleep disturbance. The patient is nervous/anxious.        Objective " "    Vitals:    02/28/20 1543   BP: 132/78   Pulse: 99   SpO2: 98%   Weight: 51.6 kg (113 lb 12.8 oz)   Height: 157.5 cm (62.01\")       Neurologic Exam     Mental Status   Registration: recalls 3 of 3 objects. Recall at 5 minutes: recalls 3 of 3 objects. Follows 3 step commands.   Attention: normal. Concentration: normal.   Level of consciousness: alert  Knowledge: good and consistent with education.   Able to name object. Able to read. Able to repeat. Able to write. Normal comprehension.     Cranial Nerves     CN II   Visual fields full to confrontation.   Visual acuity: normal  Right visual field deficit: none  Left visual field deficit: none     CN III, IV, VI   Right pupil: Shape: regular. Reactivity: brisk. Consensual response: intact.   Left pupil: Shape: regular. Reactivity: brisk. Consensual response: intact.   Nystagmus: none   Diplopia: none  Ophthalmoparesis: none  Upgaze: normal  Downgaze: normal  Conjugate gaze: present  Vestibulo-ocular reflex: present    CN V   Facial sensation intact.   Right corneal reflex: normal  Left corneal reflex: normal    CN VII   Right facial weakness: none  Left facial weakness: none    CN VIII   Hearing: intact    CN IX, X   Palate: symmetric  Right gag reflex: normal  Left gag reflex: normal    CN XI   Right sternocleidomastoid strength: normal  Left sternocleidomastoid strength: normal    CN XII   Tongue: not atrophic  Fasciculations: absent  Tongue deviation: none    Motor Exam   Muscle bulk: normal  Overall muscle tone: normal  Right arm tone: normal  Left arm tone: normal  Right leg tone: normal  Left leg tone: normal    Sensory Exam   Light touch normal.   Vibration normal.   Proprioception normal.   Pinprick normal.     Gait, Coordination, and Reflexes     Gait  Gait: non-neurologic and shuffling    Tremor   Resting tremor: absent  Intention tremor: absent  Action tremor: absent    Reflexes   Reflexes 2+ except as noted.       Physical Exam   Constitutional: She " appears well-developed and well-nourished.   Nursing note and vitals reviewed.      Admission on 12/27/2019, Discharged on 01/01/2020   Component Date Value Ref Range Status   • Extra Tube 12/28/2019 hold for add-on   Final    Auto resulted   • Extra Tube 12/28/2019 Hold for add-ons.   Final    Auto resulted.   • Extra Tube 12/28/2019 hold for add-on   Final    Auto resulted   • Extra Tube 12/28/2019 Hold for add-ons.   Final    Auto resulted.   • Glucose 12/28/2019 251* 65 - 99 mg/dL Final   • BUN 12/28/2019 15  8 - 23 mg/dL Final   • Creatinine 12/28/2019 0.89  0.57 - 1.00 mg/dL Final   • Sodium 12/28/2019 131* 136 - 145 mmol/L Final   • Potassium 12/28/2019 3.7  3.5 - 5.2 mmol/L Final   • Chloride 12/28/2019 93* 98 - 107 mmol/L Final   • CO2 12/28/2019 26.0  22.0 - 29.0 mmol/L Final   • Calcium 12/28/2019 8.8  8.6 - 10.5 mg/dL Final   • Total Protein 12/28/2019 6.4  6.0 - 8.5 g/dL Final   • Albumin 12/28/2019 4.00  3.50 - 5.20 g/dL Final   • ALT (SGPT) 12/28/2019 9  1 - 33 U/L Final   • AST (SGOT) 12/28/2019 15  1 - 32 U/L Final   • Alkaline Phosphatase 12/28/2019 64  39 - 117 U/L Final   • Total Bilirubin 12/28/2019 <0.2* 0.2 - 1.2 mg/dL Final   • eGFR Non African Amer 12/28/2019 63  >60 mL/min/1.73 Final   • Globulin 12/28/2019 2.4  gm/dL Final   • A/G Ratio 12/28/2019 1.7  g/dL Final   • BUN/Creatinine Ratio 12/28/2019 16.9  7.0 - 25.0 Final   • Anion Gap 12/28/2019 12.0  5.0 - 15.0 mmol/L Final   • Protime 12/28/2019 13.2  11.2 - 14.3 Seconds Final   • INR 12/28/2019 1.05  0.85 - 1.16 Final   • WBC 12/28/2019 7.69  3.40 - 10.80 10*3/mm3 Final   • RBC 12/28/2019 3.51* 3.77 - 5.28 10*6/mm3 Final   • Hemoglobin 12/28/2019 11.5* 12.0 - 15.9 g/dL Final   • Hematocrit 12/28/2019 34.5  34.0 - 46.6 % Final   • MCV 12/28/2019 98.3* 79.0 - 97.0 fL Final   • MCH 12/28/2019 32.8  26.6 - 33.0 pg Final   • MCHC 12/28/2019 33.3  31.5 - 35.7 g/dL Final   • RDW 12/28/2019 13.0  12.3 - 15.4 % Final   • RDW-SD 12/28/2019 46.2   37.0 - 54.0 fl Final   • MPV 12/28/2019 9.8  6.0 - 12.0 fL Final   • Platelets 12/28/2019 303  140 - 450 10*3/mm3 Final   • Neutrophil % 12/28/2019 64.5  42.7 - 76.0 % Final   • Lymphocyte % 12/28/2019 23.5  19.6 - 45.3 % Final   • Monocyte % 12/28/2019 9.2  5.0 - 12.0 % Final   • Eosinophil % 12/28/2019 1.6  0.3 - 6.2 % Final   • Basophil % 12/28/2019 0.4  0.0 - 1.5 % Final   • Immature Grans % 12/28/2019 0.8* 0.0 - 0.5 % Final   • Neutrophils, Absolute 12/28/2019 4.96  1.70 - 7.00 10*3/mm3 Final   • Lymphocytes, Absolute 12/28/2019 1.81  0.70 - 3.10 10*3/mm3 Final   • Monocytes, Absolute 12/28/2019 0.71  0.10 - 0.90 10*3/mm3 Final   • Eosinophils, Absolute 12/28/2019 0.12  0.00 - 0.40 10*3/mm3 Final   • Basophils, Absolute 12/28/2019 0.03  0.00 - 0.20 10*3/mm3 Final   • Immature Grans, Absolute 12/28/2019 0.06* 0.00 - 0.05 10*3/mm3 Final   • nRBC 12/28/2019 0.0  0.0 - 0.2 /100 WBC Final   • Color, UA 12/28/2019 Yellow  Yellow, Straw Final   • Appearance, UA 12/28/2019 Clear  Clear Final   • pH, UA 12/28/2019 6.5  5.0 - 8.0 Final   • Specific Gravity, UA 12/28/2019 1.014  1.001 - 1.030 Final   • Glucose, UA 12/28/2019 Negative  Negative Final   • Ketones, UA 12/28/2019 Negative  Negative Final   • Bilirubin, UA 12/28/2019 Negative  Negative Final   • Blood, UA 12/28/2019 Negative  Negative Final   • Protein, UA 12/28/2019 Negative  Negative Final   • Leuk Esterase, UA 12/28/2019 Trace* Negative Final   • Nitrite, UA 12/28/2019 Negative  Negative Final   • Urobilinogen, UA 12/28/2019 0.2 E.U./dL  0.2 - 1.0 E.U./dL Final   • RBC, UA 12/28/2019 0-2  None Seen, 0-2 /HPF Final   • WBC, UA 12/28/2019 6-12* None Seen, 0-2 /HPF Final   • Bacteria, UA 12/28/2019 None Seen  None Seen, Trace /HPF Final   • Squamous Epithelial Cells, UA 12/28/2019 0-2  None Seen, 0-2 /HPF Final   • Hyaline Casts, UA 12/28/2019 0-6  0 - 6 /LPF Final   • Methodology 12/28/2019 Automated Microscopy   Final   • Urine Culture 12/28/2019 <25,000  CFU/mL Mixed Iza Isolated   Final   • Glucose 12/29/2019 106* 65 - 99 mg/dL Final   • BUN 12/29/2019 11  8 - 23 mg/dL Final   • Creatinine 12/29/2019 0.79  0.57 - 1.00 mg/dL Final   • Sodium 12/29/2019 135* 136 - 145 mmol/L Final   • Potassium 12/29/2019 3.5  3.5 - 5.2 mmol/L Final   • Chloride 12/29/2019 100  98 - 107 mmol/L Final   • CO2 12/29/2019 22.0  22.0 - 29.0 mmol/L Final   • Calcium 12/29/2019 9.3  8.6 - 10.5 mg/dL Final   • eGFR Non African Amer 12/29/2019 72  >60 mL/min/1.73 Final   • BUN/Creatinine Ratio 12/29/2019 13.9  7.0 - 25.0 Final   • Anion Gap 12/29/2019 13.0  5.0 - 15.0 mmol/L Final   • WBC 12/29/2019 12.20* 3.40 - 10.80 10*3/mm3 Final   • RBC 12/29/2019 3.03* 3.77 - 5.28 10*6/mm3 Final   • Hemoglobin 12/29/2019 9.9* 12.0 - 15.9 g/dL Final   • Hematocrit 12/29/2019 30.4* 34.0 - 46.6 % Final   • MCV 12/29/2019 100.3* 79.0 - 97.0 fL Final   • MCH 12/29/2019 32.7  26.6 - 33.0 pg Final   • MCHC 12/29/2019 32.6  31.5 - 35.7 g/dL Final   • RDW 12/29/2019 13.2  12.3 - 15.4 % Final   • RDW-SD 12/29/2019 47.5  37.0 - 54.0 fl Final   • MPV 12/29/2019 9.4  6.0 - 12.0 fL Final   • Platelets 12/29/2019 292  140 - 450 10*3/mm3 Final   • Neutrophil % 12/29/2019 65.7  42.7 - 76.0 % Final   • Lymphocyte % 12/29/2019 20.4  19.6 - 45.3 % Final   • Monocyte % 12/29/2019 12.8* 5.0 - 12.0 % Final   • Eosinophil % 12/29/2019 0.2* 0.3 - 6.2 % Final   • Basophil % 12/29/2019 0.3  0.0 - 1.5 % Final   • Immature Grans % 12/29/2019 0.6* 0.0 - 0.5 % Final   • Neutrophils, Absolute 12/29/2019 8.01* 1.70 - 7.00 10*3/mm3 Final   • Lymphocytes, Absolute 12/29/2019 2.49  0.70 - 3.10 10*3/mm3 Final   • Monocytes, Absolute 12/29/2019 1.56* 0.10 - 0.90 10*3/mm3 Final   • Eosinophils, Absolute 12/29/2019 0.03  0.00 - 0.40 10*3/mm3 Final   • Basophils, Absolute 12/29/2019 0.04  0.00 - 0.20 10*3/mm3 Final   • Immature Grans, Absolute 12/29/2019 0.07* 0.00 - 0.05 10*3/mm3 Final   • nRBC 12/29/2019 0.0  0.0 - 0.2 /100 WBC Final   •  Glucose 12/30/2019 115* 65 - 99 mg/dL Final   • BUN 12/30/2019 7* 8 - 23 mg/dL Final   • Creatinine 12/30/2019 0.65  0.57 - 1.00 mg/dL Final   • Sodium 12/30/2019 134* 136 - 145 mmol/L Final   • Potassium 12/30/2019 3.7  3.5 - 5.2 mmol/L Final   • Chloride 12/30/2019 97* 98 - 107 mmol/L Final   • CO2 12/30/2019 26.0  22.0 - 29.0 mmol/L Final   • Calcium 12/30/2019 9.2  8.6 - 10.5 mg/dL Final   • eGFR Non African Amer 12/30/2019 90  >60 mL/min/1.73 Final   • BUN/Creatinine Ratio 12/30/2019 10.8  7.0 - 25.0 Final   • Anion Gap 12/30/2019 11.0  5.0 - 15.0 mmol/L Final   • WBC 12/30/2019 11.32* 3.40 - 10.80 10*3/mm3 Final   • RBC 12/30/2019 3.11* 3.77 - 5.28 10*6/mm3 Final   • Hemoglobin 12/30/2019 10.2* 12.0 - 15.9 g/dL Final   • Hematocrit 12/30/2019 31.1* 34.0 - 46.6 % Final   • MCV 12/30/2019 100.0* 79.0 - 97.0 fL Final   • MCH 12/30/2019 32.8  26.6 - 33.0 pg Final   • MCHC 12/30/2019 32.8  31.5 - 35.7 g/dL Final   • RDW 12/30/2019 13.4  12.3 - 15.4 % Final   • RDW-SD 12/30/2019 48.8  37.0 - 54.0 fl Final   • MPV 12/30/2019 9.5  6.0 - 12.0 fL Final   • Platelets 12/30/2019 298  140 - 450 10*3/mm3 Final   • Neutrophil % 12/30/2019 58.3  42.7 - 76.0 % Final   • Lymphocyte % 12/30/2019 27.2  19.6 - 45.3 % Final   • Monocyte % 12/30/2019 10.8  5.0 - 12.0 % Final   • Eosinophil % 12/30/2019 2.8  0.3 - 6.2 % Final   • Basophil % 12/30/2019 0.4  0.0 - 1.5 % Final   • Immature Grans % 12/30/2019 0.5  0.0 - 0.5 % Final   • Neutrophils, Absolute 12/30/2019 6.59  1.70 - 7.00 10*3/mm3 Final   • Lymphocytes, Absolute 12/30/2019 3.08  0.70 - 3.10 10*3/mm3 Final   • Monocytes, Absolute 12/30/2019 1.22* 0.10 - 0.90 10*3/mm3 Final   • Eosinophils, Absolute 12/30/2019 0.32  0.00 - 0.40 10*3/mm3 Final   • Basophils, Absolute 12/30/2019 0.05  0.00 - 0.20 10*3/mm3 Final   • Immature Grans, Absolute 12/30/2019 0.06* 0.00 - 0.05 10*3/mm3 Final   • nRBC 12/30/2019 0.0  0.0 - 0.2 /100 WBC Final   • Glucose 01/01/2020 115* 65 - 99 mg/dL  Final   • BUN 01/01/2020 14  8 - 23 mg/dL Final   • Creatinine 01/01/2020 0.70  0.57 - 1.00 mg/dL Final   • Sodium 01/01/2020 131* 136 - 145 mmol/L Final   • Potassium 01/01/2020 4.4  3.5 - 5.2 mmol/L Final   • Chloride 01/01/2020 92* 98 - 107 mmol/L Final   • CO2 01/01/2020 27.0  22.0 - 29.0 mmol/L Final   • Calcium 01/01/2020 9.9  8.6 - 10.5 mg/dL Final   • eGFR Non African Amer 01/01/2020 82  >60 mL/min/1.73 Final   • BUN/Creatinine Ratio 01/01/2020 20.0  7.0 - 25.0 Final   • Anion Gap 01/01/2020 12.0  5.0 - 15.0 mmol/L Final         Assessment/Plan     Problem List Items Addressed This Visit        Cardiovascular and Mediastinum    Essential hypertension    Relevant Medications    losartan (COZAAR) 25 MG tablet       Nervous and Auditory    MS (multiple sclerosis) (CMS/HCC) - Primary    Current Assessment & Plan     Off DMT    Pt will obtain past MRI Brain to review     Flexor spasms improved on OXC          Neuropathic pain    Current Assessment & Plan     Refill Zanaflex and OXC                   No follow-ups on file.

## 2020-04-26 DIAGNOSIS — G89.29 CHRONIC LEFT SHOULDER PAIN: ICD-10-CM

## 2020-04-26 DIAGNOSIS — M25.512 CHRONIC LEFT SHOULDER PAIN: ICD-10-CM

## 2020-04-27 RX ORDER — NAPROXEN 500 MG/1
TABLET ORAL
Qty: 180 TABLET | Refills: 0 | Status: SHIPPED | OUTPATIENT
Start: 2020-04-27 | End: 2020-09-19

## 2020-05-11 ENCOUNTER — TELEPHONE (OUTPATIENT)
Dept: NEUROLOGY | Facility: CLINIC | Age: 70
End: 2020-05-11

## 2020-05-11 RX ORDER — QUETIAPINE FUMARATE 100 MG/1
100 TABLET, FILM COATED ORAL NIGHTLY
Qty: 30 TABLET | Refills: 6 | Status: SHIPPED | OUTPATIENT
Start: 2020-05-11 | End: 2020-05-11 | Stop reason: SDUPTHER

## 2020-05-11 RX ORDER — QUETIAPINE FUMARATE 100 MG/1
100 TABLET, FILM COATED ORAL NIGHTLY
Qty: 90 TABLET | Refills: 6 | Status: SHIPPED | OUTPATIENT
Start: 2020-05-11 | End: 2021-06-23

## 2020-05-11 NOTE — TELEPHONE ENCOUNTER
----- Message from Luis A Abel MA sent at 5/11/2020  9:30 AM EDT -----  Patient requesting 90 day supply for Quetiapine Fumarate 100mg [id number]

## 2020-05-27 ENCOUNTER — TRANSCRIBE ORDERS (OUTPATIENT)
Dept: LAB | Facility: HOSPITAL | Age: 70
End: 2020-05-27

## 2020-05-27 ENCOUNTER — TRANSCRIBE ORDERS (OUTPATIENT)
Dept: ADMINISTRATIVE | Facility: HOSPITAL | Age: 70
End: 2020-05-27

## 2020-05-27 ENCOUNTER — HOSPITAL ENCOUNTER (OUTPATIENT)
Dept: GENERAL RADIOLOGY | Facility: HOSPITAL | Age: 70
Discharge: HOME | End: 2020-05-27
Admitting: ORTHOPAEDIC SURGERY

## 2020-05-27 ENCOUNTER — LAB (OUTPATIENT)
Dept: LAB | Facility: HOSPITAL | Age: 70
End: 2020-05-27

## 2020-05-27 ENCOUNTER — APPOINTMENT (OUTPATIENT)
Dept: PREADMISSION TESTING | Facility: HOSPITAL | Age: 70
End: 2020-05-27

## 2020-05-27 DIAGNOSIS — Z87.898 PERSONAL HISTORY OF OTHER LYMPHATIC AND HEMATOPOIETIC NEOPLASM: ICD-10-CM

## 2020-05-27 DIAGNOSIS — Z01.818 OTHER SPECIFIED PRE-OPERATIVE EXAMINATION: ICD-10-CM

## 2020-05-27 DIAGNOSIS — R73.09 IMPAIRED GLUCOSE TOLERANCE TEST: ICD-10-CM

## 2020-05-27 DIAGNOSIS — Z01.811 PRE-OP CHEST EXAM: ICD-10-CM

## 2020-05-27 DIAGNOSIS — Z87.898 PERSONAL HISTORY OF OTHER LYMPHATIC AND HEMATOPOIETIC NEOPLASM: Primary | ICD-10-CM

## 2020-05-27 DIAGNOSIS — Z01.811 PRE-OP CHEST EXAM: Primary | ICD-10-CM

## 2020-05-27 LAB
BASOPHILS # BLD AUTO: 0.03 10*3/MM3 (ref 0–0.2)
BASOPHILS NFR BLD AUTO: 0.3 % (ref 0–1.5)
DEPRECATED RDW RBC AUTO: 44 FL (ref 37–54)
EOSINOPHIL # BLD AUTO: 0.09 10*3/MM3 (ref 0–0.4)
EOSINOPHIL NFR BLD AUTO: 1 % (ref 0.3–6.2)
ERYTHROCYTE [DISTWIDTH] IN BLOOD BY AUTOMATED COUNT: 12.9 % (ref 12.3–15.4)
HBA1C MFR BLD: 6 % (ref 4.8–5.6)
HCT VFR BLD AUTO: 40.8 % (ref 34–46.6)
HGB BLD-MCNC: 13.7 G/DL (ref 12–15.9)
IMM GRANULOCYTES # BLD AUTO: 0.05 10*3/MM3 (ref 0–0.05)
IMM GRANULOCYTES NFR BLD AUTO: 0.5 % (ref 0–0.5)
LYMPHOCYTES # BLD AUTO: 2.94 10*3/MM3 (ref 0.7–3.1)
LYMPHOCYTES NFR BLD AUTO: 31 % (ref 19.6–45.3)
MCH RBC QN AUTO: 31.5 PG (ref 26.6–33)
MCHC RBC AUTO-ENTMCNC: 33.6 G/DL (ref 31.5–35.7)
MCV RBC AUTO: 93.8 FL (ref 79–97)
MONOCYTES # BLD AUTO: 0.84 10*3/MM3 (ref 0.1–0.9)
MONOCYTES NFR BLD AUTO: 8.9 % (ref 5–12)
NEUTROPHILS # BLD AUTO: 5.52 10*3/MM3 (ref 1.7–7)
NEUTROPHILS NFR BLD AUTO: 58.3 % (ref 42.7–76)
NRBC BLD AUTO-RTO: 0 /100 WBC (ref 0–0.2)
PLATELET # BLD AUTO: 440 10*3/MM3 (ref 140–450)
PMV BLD AUTO: 9.8 FL (ref 6–12)
RBC # BLD AUTO: 4.35 10*6/MM3 (ref 3.77–5.28)
WBC NRBC COR # BLD: 9.47 10*3/MM3 (ref 3.4–10.8)

## 2020-05-27 PROCEDURE — U0002 COVID-19 LAB TEST NON-CDC: HCPCS

## 2020-05-27 PROCEDURE — U0004 COV-19 TEST NON-CDC HGH THRU: HCPCS

## 2020-05-27 PROCEDURE — 80048 BASIC METABOLIC PNL TOTAL CA: CPT

## 2020-05-27 PROCEDURE — 71046 X-RAY EXAM CHEST 2 VIEWS: CPT

## 2020-05-27 PROCEDURE — 83036 HEMOGLOBIN GLYCOSYLATED A1C: CPT

## 2020-05-27 PROCEDURE — 85025 COMPLETE CBC W/AUTO DIFF WBC: CPT

## 2020-05-27 PROCEDURE — 93010 ELECTROCARDIOGRAM REPORT: CPT | Performed by: INTERNAL MEDICINE

## 2020-05-27 PROCEDURE — 93005 ELECTROCARDIOGRAM TRACING: CPT

## 2020-05-27 PROCEDURE — C9803 HOPD COVID-19 SPEC COLLECT: HCPCS

## 2020-05-27 PROCEDURE — 36415 COLL VENOUS BLD VENIPUNCTURE: CPT

## 2020-05-28 LAB
ANION GAP SERPL CALCULATED.3IONS-SCNC: 15.2 MMOL/L (ref 5–15)
BUN BLD-MCNC: 13 MG/DL (ref 8–23)
BUN/CREAT SERPL: 15.7 (ref 7–25)
CALCIUM SPEC-SCNC: 10.2 MG/DL (ref 8.6–10.5)
CHLORIDE SERPL-SCNC: 94 MMOL/L (ref 98–107)
CO2 SERPL-SCNC: 22.8 MMOL/L (ref 22–29)
CREAT BLD-MCNC: 0.83 MG/DL (ref 0.57–1)
GFR SERPL CREATININE-BSD FRML MDRD: 68 ML/MIN/1.73
GLUCOSE BLD-MCNC: 109 MG/DL (ref 65–99)
POTASSIUM BLD-SCNC: 4.2 MMOL/L (ref 3.5–5.2)
REF LAB TEST METHOD: NORMAL
SARS-COV-2 RNA RESP QL NAA+PROBE: NOT DETECTED
SODIUM BLD-SCNC: 132 MMOL/L (ref 136–145)

## 2020-07-26 DIAGNOSIS — M25.512 CHRONIC LEFT SHOULDER PAIN: ICD-10-CM

## 2020-07-26 DIAGNOSIS — G89.29 CHRONIC LEFT SHOULDER PAIN: ICD-10-CM

## 2020-07-27 RX ORDER — NAPROXEN 500 MG/1
TABLET ORAL
Qty: 180 TABLET | Refills: 0 | OUTPATIENT
Start: 2020-07-27

## 2020-08-09 DIAGNOSIS — G35 MULTIPLE SCLEROSIS (HCC): ICD-10-CM

## 2020-08-10 RX ORDER — OXCARBAZEPINE 300 MG/1
TABLET, FILM COATED ORAL
Qty: 180 TABLET | Refills: 0 | Status: SHIPPED | OUTPATIENT
Start: 2020-08-10 | End: 2020-11-02

## 2020-09-08 DIAGNOSIS — I10 ESSENTIAL HYPERTENSION: ICD-10-CM

## 2020-09-08 RX ORDER — LOSARTAN POTASSIUM 25 MG/1
TABLET ORAL
Qty: 90 TABLET | Refills: 1 | Status: SHIPPED | OUTPATIENT
Start: 2020-09-08 | End: 2021-03-15

## 2020-09-18 DIAGNOSIS — G89.29 CHRONIC LEFT SHOULDER PAIN: ICD-10-CM

## 2020-09-18 DIAGNOSIS — M25.512 CHRONIC LEFT SHOULDER PAIN: ICD-10-CM

## 2020-09-18 RX ORDER — TIZANIDINE 4 MG/1
TABLET ORAL
Qty: 180 TABLET | Refills: 1 | Status: SHIPPED | OUTPATIENT
Start: 2020-09-18 | End: 2021-01-04

## 2020-09-19 RX ORDER — NAPROXEN 500 MG/1
TABLET ORAL
Qty: 60 TABLET | Refills: 1 | Status: SHIPPED | OUTPATIENT
Start: 2020-09-19 | End: 2021-03-22

## 2020-11-02 ENCOUNTER — OFFICE VISIT (OUTPATIENT)
Dept: FAMILY MEDICINE CLINIC | Facility: CLINIC | Age: 70
End: 2020-11-02

## 2020-11-02 VITALS
OXYGEN SATURATION: 96 % | WEIGHT: 103.8 LBS | DIASTOLIC BLOOD PRESSURE: 80 MMHG | RESPIRATION RATE: 12 BRPM | BODY MASS INDEX: 19.1 KG/M2 | HEART RATE: 94 BPM | TEMPERATURE: 98 F | SYSTOLIC BLOOD PRESSURE: 138 MMHG | HEIGHT: 62 IN

## 2020-11-02 DIAGNOSIS — G35 MULTIPLE SCLEROSIS (HCC): ICD-10-CM

## 2020-11-02 DIAGNOSIS — F41.9 ANXIETY: Primary | ICD-10-CM

## 2020-11-02 DIAGNOSIS — R63.4 WEIGHT LOSS: ICD-10-CM

## 2020-11-02 LAB
ALBUMIN SERPL-MCNC: 5 G/DL (ref 3.5–5.2)
ALBUMIN/GLOB SERPL: 1.9 G/DL
ALP SERPL-CCNC: 98 U/L (ref 39–117)
ALT SERPL W P-5'-P-CCNC: 17 U/L (ref 1–33)
ANION GAP SERPL CALCULATED.3IONS-SCNC: 8.7 MMOL/L (ref 5–15)
AST SERPL-CCNC: 21 U/L (ref 1–32)
BASOPHILS # BLD AUTO: 0.03 10*3/MM3 (ref 0–0.2)
BASOPHILS NFR BLD AUTO: 0.3 % (ref 0–1.5)
BILIRUB SERPL-MCNC: 0.3 MG/DL (ref 0–1.2)
BUN SERPL-MCNC: 12 MG/DL (ref 8–23)
BUN/CREAT SERPL: 16 (ref 7–25)
CALCIUM SPEC-SCNC: 10.5 MG/DL (ref 8.6–10.5)
CHLORIDE SERPL-SCNC: 95 MMOL/L (ref 98–107)
CO2 SERPL-SCNC: 28.3 MMOL/L (ref 22–29)
CREAT SERPL-MCNC: 0.75 MG/DL (ref 0.57–1)
DEPRECATED RDW RBC AUTO: 43.9 FL (ref 37–54)
EOSINOPHIL # BLD AUTO: 0.13 10*3/MM3 (ref 0–0.4)
EOSINOPHIL NFR BLD AUTO: 1.4 % (ref 0.3–6.2)
ERYTHROCYTE [DISTWIDTH] IN BLOOD BY AUTOMATED COUNT: 12.5 % (ref 12.3–15.4)
GFR SERPL CREATININE-BSD FRML MDRD: 76 ML/MIN/1.73
GLOBULIN UR ELPH-MCNC: 2.7 GM/DL
GLUCOSE SERPL-MCNC: 105 MG/DL (ref 65–99)
HCT VFR BLD AUTO: 41.5 % (ref 34–46.6)
HGB BLD-MCNC: 14.4 G/DL (ref 12–15.9)
IMM GRANULOCYTES # BLD AUTO: 0.05 10*3/MM3 (ref 0–0.05)
IMM GRANULOCYTES NFR BLD AUTO: 0.5 % (ref 0–0.5)
LYMPHOCYTES # BLD AUTO: 2.47 10*3/MM3 (ref 0.7–3.1)
LYMPHOCYTES NFR BLD AUTO: 26.1 % (ref 19.6–45.3)
MCH RBC QN AUTO: 33.3 PG (ref 26.6–33)
MCHC RBC AUTO-ENTMCNC: 34.7 G/DL (ref 31.5–35.7)
MCV RBC AUTO: 95.8 FL (ref 79–97)
MONOCYTES # BLD AUTO: 0.81 10*3/MM3 (ref 0.1–0.9)
MONOCYTES NFR BLD AUTO: 8.6 % (ref 5–12)
NEUTROPHILS NFR BLD AUTO: 5.97 10*3/MM3 (ref 1.7–7)
NEUTROPHILS NFR BLD AUTO: 63.1 % (ref 42.7–76)
NRBC BLD AUTO-RTO: 0.1 /100 WBC (ref 0–0.2)
PLATELET # BLD AUTO: 412 10*3/MM3 (ref 140–450)
PMV BLD AUTO: 9.7 FL (ref 6–12)
POTASSIUM SERPL-SCNC: 5.1 MMOL/L (ref 3.5–5.2)
PROT SERPL-MCNC: 7.7 G/DL (ref 6–8.5)
RBC # BLD AUTO: 4.33 10*6/MM3 (ref 3.77–5.28)
SODIUM SERPL-SCNC: 132 MMOL/L (ref 136–145)
TSH SERPL DL<=0.05 MIU/L-ACNC: 0.87 UIU/ML (ref 0.27–4.2)
WBC # BLD AUTO: 9.46 10*3/MM3 (ref 3.4–10.8)

## 2020-11-02 PROCEDURE — 80053 COMPREHEN METABOLIC PANEL: CPT | Performed by: FAMILY MEDICINE

## 2020-11-02 PROCEDURE — 99214 OFFICE O/P EST MOD 30 MIN: CPT | Performed by: FAMILY MEDICINE

## 2020-11-02 PROCEDURE — 84443 ASSAY THYROID STIM HORMONE: CPT | Performed by: FAMILY MEDICINE

## 2020-11-02 PROCEDURE — 85025 COMPLETE CBC W/AUTO DIFF WBC: CPT | Performed by: FAMILY MEDICINE

## 2020-11-02 PROCEDURE — 36415 COLL VENOUS BLD VENIPUNCTURE: CPT | Performed by: FAMILY MEDICINE

## 2020-11-02 RX ORDER — HYDROXYZINE HYDROCHLORIDE 25 MG/1
25 TABLET, FILM COATED ORAL 2 TIMES DAILY PRN
Qty: 30 TABLET | Refills: 0 | Status: SHIPPED | OUTPATIENT
Start: 2020-11-02 | End: 2020-12-28

## 2020-11-02 RX ORDER — VENLAFAXINE HYDROCHLORIDE 37.5 MG/1
37.5 CAPSULE, EXTENDED RELEASE ORAL DAILY
Qty: 30 CAPSULE | Refills: 0 | Status: SHIPPED | OUTPATIENT
Start: 2020-11-02 | End: 2020-11-29

## 2020-11-02 RX ORDER — OXCARBAZEPINE 300 MG/1
TABLET, FILM COATED ORAL
Qty: 180 TABLET | Refills: 3 | Status: SHIPPED | OUTPATIENT
Start: 2020-11-02 | End: 2021-08-20

## 2020-11-02 NOTE — PROGRESS NOTES
Subjective   Angela Diaz Parent is a 69 y.o. female.   Establish care    Has had increased weight loss, 22 lbs since 5/20. Pt is eating normally. Has increased stress and anxiety, decreased energy. Has panic attacks and chest pain. Had EKG in May which was stable.  Patient is requesting alprazolam.  She states she was on this up until January of this year.  Was in [facility] in January for tib fib fracture. Had to have hardware removed 5/20 due to rejection of the hardware. Not currently in PT. Continues to follow with ortho.  Lives with son.  Anxiety  Presents for follow-up visit. Symptoms include chest pain, depressed mood, excessive worry, irritability, malaise, nervous/anxious behavior and panic. Patient reports no dizziness, insomnia, nausea, palpitations, shortness of breath or suicidal ideas. Symptoms occur most days. The patient sleeps 8 hours per night. The quality of sleep is good. Nighttime awakenings: occasional.     Compliance with medications is %.   Pt follows with pain management for chronic pain syndrome.    The following portions of the patient's history were reviewed and updated as appropriate: allergies, current medications, past family history, past medical history, past social history, past surgical history and problem list.  Vitals:    11/02/20 1035   BP: 138/80   Pulse: 94   Resp: 12   Temp: 98 °F (36.7 °C)   SpO2: 96%     Body mass index is 18.98 kg/m².  Review of Systems   Constitutional: Positive for irritability. Negative for activity change, fatigue, fever, unexpected weight gain and unexpected weight loss.   HENT: Negative.  Negative for congestion, sneezing and sore throat.    Eyes: Negative.  Negative for blurred vision, double vision and visual disturbance.   Respiratory: Negative.  Negative for cough, chest tightness, shortness of breath and wheezing.    Cardiovascular: Positive for chest pain. Negative for palpitations and leg swelling.   Gastrointestinal: Negative.   Negative for abdominal distention, abdominal pain, blood in stool, constipation, diarrhea and nausea.   Endocrine: Negative.  Negative for cold intolerance and heat intolerance.   Genitourinary: Negative.  Negative for dysuria, frequency, urgency and urinary incontinence.   Musculoskeletal: Negative.  Negative for arthralgias and myalgias.   Skin: Negative.  Negative for rash.   Allergic/Immunologic: Negative.    Neurological: Negative.  Negative for dizziness, syncope, numbness and memory problem.   Hematological: Negative.  Negative for adenopathy.   Psychiatric/Behavioral: Positive for depressed mood. Negative for suicidal ideas. The patient is nervous/anxious. The patient does not have insomnia.    All other systems reviewed and are negative.      Objective   Physical Exam  Vitals signs and nursing note reviewed.   Constitutional:       General: She is not in acute distress.     Appearance: She is well-developed. She is not diaphoretic.   HENT:      Right Ear: External ear normal.      Left Ear: External ear normal.      Nose: Nose normal.   Eyes:      Conjunctiva/sclera: Conjunctivae normal.      Pupils: Pupils are equal, round, and reactive to light.   Neck:      Musculoskeletal: Normal range of motion and neck supple.      Thyroid: No thyromegaly.   Cardiovascular:      Rate and Rhythm: Normal rate and regular rhythm.      Heart sounds: Normal heart sounds. No murmur.   Pulmonary:      Effort: Pulmonary effort is normal. No respiratory distress.      Breath sounds: Normal breath sounds. No wheezing.   Abdominal:      General: Bowel sounds are normal. There is no distension.      Palpations: Abdomen is soft. There is no mass.      Tenderness: There is no abdominal tenderness. There is no guarding or rebound.      Hernia: No hernia is present.   Musculoskeletal: Normal range of motion.         General: No tenderness.   Lymphadenopathy:      Cervical: No cervical adenopathy.   Skin:     General: Skin is warm and  dry.      Coloration: Skin is not pale.      Findings: No erythema or rash.   Neurological:      Mental Status: She is alert and oriented to person, place, and time.      Deep Tendon Reflexes: Reflexes are normal and symmetric.   Psychiatric:         Behavior: Behavior normal.         Thought Content: Thought content normal.         Judgment: Judgment normal.             Assessment/Plan   Diagnoses and all orders for this visit:    1. Anxiety (Primary)  -     venlafaxine XR (Effexor XR) 37.5 MG 24 hr capsule; Take 1 capsule by mouth Daily.  Dispense: 30 capsule; Refill: 0  -     Ambulatory Referral to Behavioral Health  -     hydrOXYzine (ATARAX) 25 MG tablet; Take 1 tablet by mouth 2 (Two) Times a Day As Needed for Anxiety.  Dispense: 30 tablet; Refill: 0    2. Weight loss  -     CBC & Differential  -     Comprehensive Metabolic Panel  -     TSH  -     CBC Auto Differential      Refuses referral to Heart clinic. States she has had negative cardiac work up.  I discussed with patient that I would not be prescribing alprazolam for chronic use.  Patient screened positive for depression based on a PHQ-9 score of 18 on 11/2/2020. Follow-up recommendations include: PCP managing depression.  Today I have spent a total of 25 minutes face to face with Angela Diaz Briana.  During that time, a total of 15 minutes was spent counseling on anxiety and weight loss.  Discussed medications and side effects, compliance and follow-up.         Yes

## 2020-11-04 ENCOUNTER — TELEPHONE (OUTPATIENT)
Dept: FAMILY MEDICINE CLINIC | Facility: CLINIC | Age: 70
End: 2020-11-04

## 2020-11-04 NOTE — TELEPHONE ENCOUNTER
Patient requested a call back.     Patient stated she was prescribed    venlafaxine XR (Effexor XR) 37.5 MG 24 hr capsule   On 11/2/20. Patient has been experiencing severe headaches, stomach pain, and diarrhea since starting this medication. Patient stated she can not continue to take this medication.    Patient also wanted to let Dr. Browning know that she had been previously prescribed hydrOXYzine (ATARAX) 25 MG tablet by her dermatologist, Dr. Parrish. Patient wanted to let Dr. Browning know that is why the medication sounded familiar to her.    Please call and advise. Patient call back [phone]    [facility]/pharmacy #[id number] - [place], [place] - [address] [phone] [facility] [phone]

## 2020-11-08 ENCOUNTER — EPISODE CHANGES (OUTPATIENT)
Dept: CASE MANAGEMENT | Facility: OTHER | Age: 70
End: 2020-11-08

## 2020-11-28 DIAGNOSIS — F41.9 ANXIETY: ICD-10-CM

## 2020-11-29 RX ORDER — VENLAFAXINE HYDROCHLORIDE 37.5 MG/1
CAPSULE, EXTENDED RELEASE ORAL
Qty: 30 CAPSULE | Refills: 0 | Status: SHIPPED | OUTPATIENT
Start: 2020-11-29 | End: 2020-11-30 | Stop reason: SDUPTHER

## 2020-11-30 DIAGNOSIS — F41.9 ANXIETY: ICD-10-CM

## 2020-11-30 RX ORDER — VENLAFAXINE HYDROCHLORIDE 37.5 MG/1
37.5 CAPSULE, EXTENDED RELEASE ORAL DAILY
Qty: 30 CAPSULE | Refills: 0 | Status: SHIPPED | OUTPATIENT
Start: 2020-11-30 | End: 2020-12-02 | Stop reason: SDUPTHER

## 2020-12-02 DIAGNOSIS — F41.9 ANXIETY: ICD-10-CM

## 2020-12-02 RX ORDER — VENLAFAXINE HYDROCHLORIDE 37.5 MG/1
37.5 CAPSULE, EXTENDED RELEASE ORAL DAILY
Qty: 90 CAPSULE | Refills: 0 | Status: SHIPPED | OUTPATIENT
Start: 2020-12-02 | End: 2021-03-22

## 2020-12-05 ENCOUNTER — PATIENT OUTREACH (OUTPATIENT)
Dept: CASE MANAGEMENT | Facility: OTHER | Age: 70
End: 2020-12-05

## 2020-12-05 NOTE — OUTREACH NOTE
Care Coordination Assessment    Documented/Reviewed By: Kecia Allen RN Date/time: 12/5/2020  1:44 PM   Assessment completed with: patient  Enrolled in care management program: Yes  Living arrangement: children (Comment: adult son lives with patient)  Support system: family, neighbors  Type of residence: private residence  Home care services: No  Equipment used at home: wheelchair, cane, walker  Communication device: No  Bed or wheelchair confined: No  Inadequate nutrition: No  Medication adherence problem: No  Experiencing side effects from current medications: No  History of fall(s) in last 6 months: No  Difficulty keeping appointments: No

## 2020-12-05 NOTE — OUTREACH NOTE
Care Plan Note      Responses   Lifestyle Goals  Routine follow-up with doctor(s)   Barriers  Other (See Comment) [Weakness, no energy]   Self Management  Medication Adherence, Increase Physical Activities   Suggested Appointments  Make an Appointment with Behavioral Therapy   Annual Wellness Visit:   Care Coordinator Will Schedule   Care Gaps Addressed  Colon Cancer Screening, Mammogram, Flu Shot, Pneumonia Vaccine   Colon Cancer Screening Type  Colonoscopy   Colonoscopy Status  Up to Date (< 10 yrs)   Flu Shot Status  Refused   Mammogram Status  Up to Date   Pneumonia Vaccine Status  Refused   Other Patient Education/Resources   24/7 [facility] Nurse Call Line   24/7 Nurse Call Line Education Method  Verbal   Ed Visits past 12 months:  1   Hospitalizations past 12 months  1   Medication Adherence  Medications understood        The main concerns and/or symptoms the patient would like to address are:  Patient stated, since ankle was broken, then hardware had to be taken out in May 2020, she has lost about 25 lbs, and weighs now about 100lbs.  Stated she is very weak, and has no energy.  Said normally she is very active, but this year, since May, her activity is minimal; said this has been the worst year of her life.  Patient said she knows she deals with anxiety and depression.  She voiced compliance with all her daily medications, and said she has everything filled.  Reported she only was able to take one of the antidepressants that Dr. Browning ordered in Nov.2,2020 appt; it caused her to have extreme diarrhea, so she stopped it.  Said she did report this to the PCP office, so Dr. Browning would know.  Patient said she was told her lab-work in November all came back okay.  Reports she is able to walk okay; uses a Cane most of time; uses her Walker when feeling more weak than usual. Patient said she lives with her son; son does all the shopping, cooking, cleaning; he drives her to [place], if she cannot drive.   Patient reports she is eating and drinking well; she drinks Ensure to supplement.  Patient said Dr. Browning' office provided her with 3 phone numbers to call for Behavioral Health appointments; she has not called yet.      Education/instruction provided by Care Coordinator:   Explained role of Ambulatory  and contact information given.  Supportive listening provided as patient talked of her experiences this year effecting her health.  Discussed importance of her diet, to include protein with every meal, fresh fruits and vegetables, supplementing with Ensure every day.  Discussed importance of taking care of mental and emotional health as well as physical health, and encouraged patient to call for Behavioral Health appointment Monday.  Patient agreed.  Reviewed need to schedule AWV with Dr. Browning, and offered to make this appt for her.  Patient agreed to this. Provided [facility] 24/7 Nurse Phone Line #.  Patient voiced appreciation for the RN-ACM call.    Follow Up Outreach Due:   Next week     Kecia Allen RN  Ambulatory     12/5/2020, 13:50 EST

## 2020-12-07 ENCOUNTER — PATIENT OUTREACH (OUTPATIENT)
Dept: CASE MANAGEMENT | Facility: OTHER | Age: 70
End: 2020-12-07

## 2020-12-07 NOTE — OUTREACH NOTE
Care Coordination Note    Called patient's PCP, Dr. Berenice Browning' office, spoke with Iris.  Requested and received patient's Annual Wellness Visit appointment: Monday, 1-18-21, at 1:45pm.    Kecia Allen RN  Ambulatory     12/7/2020, 11:32 EST

## 2020-12-07 NOTE — OUTREACH NOTE
Patient Outreach Note    Called patient and informed her of her Annual Wellness Visit appt for Monday, January 18, 2021, at 1:45pm.  She voiced understanding and agreement and said she wrote it down.  Talked with patient about calling the Behavioral Health numbers today that she was given, and making an appointment; importance of taking care of her mental and emotional health along with her physical health.  Patient said she thought she would call today for an appt.  Discussed with patient about the value of increasing physical activity a little each day, even 5-10 minutes at a time, whether walking around inside house, or good to get fresh air outside of the house if weather allows.  Patient said she is trying to do this, along with using 2 lb weights to use her muscles more.  Discussed Advanced Directives with patient; she said she has a Living Will, that has not been notorized.  Discussed MyChart; patient voiced no interest.  Will follow up with patient next month, or as needed.      Kecia Allen RN  Ambulatory     12/7/2020, 11:39 EST

## 2020-12-18 DIAGNOSIS — M25.512 CHRONIC LEFT SHOULDER PAIN: ICD-10-CM

## 2020-12-18 DIAGNOSIS — G89.29 CHRONIC LEFT SHOULDER PAIN: ICD-10-CM

## 2020-12-18 RX ORDER — NAPROXEN 500 MG/1
TABLET ORAL
Qty: 60 TABLET | Refills: 1 | OUTPATIENT
Start: 2020-12-18

## 2020-12-26 DIAGNOSIS — F41.9 ANXIETY: ICD-10-CM

## 2020-12-28 RX ORDER — HYDROXYZINE HYDROCHLORIDE 25 MG/1
25 TABLET, FILM COATED ORAL 2 TIMES DAILY PRN
Qty: 30 TABLET | Refills: 0 | Status: SHIPPED | OUTPATIENT
Start: 2020-12-28 | End: 2021-01-11

## 2021-01-04 RX ORDER — TIZANIDINE 4 MG/1
TABLET ORAL
Qty: 180 TABLET | Refills: 0 | Status: SHIPPED | OUTPATIENT
Start: 2021-01-04 | End: 2021-03-22

## 2021-01-11 DIAGNOSIS — F41.9 ANXIETY: ICD-10-CM

## 2021-01-11 RX ORDER — HYDROXYZINE HYDROCHLORIDE 25 MG/1
25 TABLET, FILM COATED ORAL 2 TIMES DAILY PRN
Qty: 30 TABLET | Refills: 0 | Status: SHIPPED | OUTPATIENT
Start: 2021-01-11 | End: 2021-02-01

## 2021-01-30 DIAGNOSIS — F41.9 ANXIETY: ICD-10-CM

## 2021-02-01 RX ORDER — HYDROXYZINE HYDROCHLORIDE 25 MG/1
25 TABLET, FILM COATED ORAL 2 TIMES DAILY PRN
Qty: 30 TABLET | Refills: 0 | Status: SHIPPED | OUTPATIENT
Start: 2021-02-01 | End: 2021-02-15

## 2021-02-09 DIAGNOSIS — F41.9 ANXIETY: ICD-10-CM

## 2021-02-09 RX ORDER — HYDROXYZINE HYDROCHLORIDE 25 MG/1
TABLET, FILM COATED ORAL
Qty: 30 TABLET | Refills: 0 | OUTPATIENT
Start: 2021-02-09

## 2021-02-13 DIAGNOSIS — F41.9 ANXIETY: ICD-10-CM

## 2021-02-15 RX ORDER — HYDROXYZINE HYDROCHLORIDE 25 MG/1
TABLET, FILM COATED ORAL
Qty: 30 TABLET | Refills: 0 | Status: SHIPPED | OUTPATIENT
Start: 2021-02-15 | End: 2021-03-22

## 2021-03-14 DIAGNOSIS — I10 ESSENTIAL HYPERTENSION: ICD-10-CM

## 2021-03-15 RX ORDER — LOSARTAN POTASSIUM 25 MG/1
TABLET ORAL
Qty: 30 TABLET | Refills: 0 | Status: SHIPPED | OUTPATIENT
Start: 2021-03-15 | End: 2021-07-09

## 2021-03-22 ENCOUNTER — OFFICE VISIT (OUTPATIENT)
Dept: FAMILY MEDICINE CLINIC | Facility: CLINIC | Age: 71
End: 2021-03-22

## 2021-03-22 VITALS
HEIGHT: 62 IN | DIASTOLIC BLOOD PRESSURE: 96 MMHG | BODY MASS INDEX: 19.14 KG/M2 | OXYGEN SATURATION: 96 % | SYSTOLIC BLOOD PRESSURE: 160 MMHG | HEART RATE: 101 BPM | WEIGHT: 104 LBS

## 2021-03-22 DIAGNOSIS — R53.83 FATIGUE, UNSPECIFIED TYPE: ICD-10-CM

## 2021-03-22 DIAGNOSIS — R10.33 PERIUMBILICAL ABDOMINAL PAIN: ICD-10-CM

## 2021-03-22 DIAGNOSIS — R05.9 COUGH: Primary | ICD-10-CM

## 2021-03-22 DIAGNOSIS — R73.03 PRE-DIABETES: ICD-10-CM

## 2021-03-22 DIAGNOSIS — R63.4 WEIGHT LOSS: ICD-10-CM

## 2021-03-22 PROBLEM — F32.9 REACTIVE DEPRESSION: Status: ACTIVE | Noted: 2021-03-22

## 2021-03-22 PROBLEM — R53.82 CHRONIC FATIGUE: Status: ACTIVE | Noted: 2021-03-22

## 2021-03-22 PROBLEM — R10.30 LOWER ABDOMINAL PAIN: Status: ACTIVE | Noted: 2021-03-22

## 2021-03-22 PROCEDURE — 99214 OFFICE O/P EST MOD 30 MIN: CPT | Performed by: INTERNAL MEDICINE

## 2021-03-22 RX ORDER — ALBUTEROL SULFATE 90 UG/1
2 AEROSOL, METERED RESPIRATORY (INHALATION) EVERY 4 HOURS PRN
Qty: 17 G | Refills: 6 | Status: SHIPPED | OUTPATIENT
Start: 2021-03-22 | End: 2021-11-08

## 2021-03-22 RX ORDER — TIZANIDINE 4 MG/1
TABLET ORAL
Qty: 180 TABLET | Refills: 0 | Status: SHIPPED | OUTPATIENT
Start: 2021-03-22 | End: 2021-07-20

## 2021-03-22 NOTE — PROGRESS NOTES
Angela Warren  1950  [id number]  Patient Care Team:  Kris Malin MD as PCP - General (Internal Medicine)  Justin Cardoso MD as Consulting Physician (Ophthalmology)  Justin Parrish MD as Consulting Physician (Dermatology)  Derrick Harding II, MD (Pain Medicine)  Stan Lozoya MD as Consulting Physician (Orthopedic Surgery)  Mehdi Petit MD as Consulting Physician (Neurology)  Kecia Allen RN as Ambulatory  ([facility])    Angela Warren is a 70 y.o. female here today to establish care.  This patient is accompanied by their   who contributes to the history of their care.    Chief Complaint:    Chief Complaint   Patient presents with   • Establish Care   • Weight Loss     states she has extreme weight loss    • Cough     last couple months quit smoking in 2019 but has started again and wants to quit smoking again   • Depression     states not knowing whats wrong with her and not being able to do anything         History of Present Illness:    I have reviewed and/or updated the patient's past medical, past surgical, family, social history, problem list and allergies as appropriate.      reports weight loss from 120 lbs since being discharged from ankle fracture. Weight actually increase 1 lb since may    Reports cough forpast several months. No hemoptyis. + wheezing coughs up sputum, clear to white. No fevers chills or night sweats. Still smoking 1/2 ppd. 5/2019 was able to quit for one year. Started back after surgery.    Loss of interest feels depressed. Tried effexor but could not tolerate 2/2 to side effects. Takes seroquel.. cant do anything. Feels isolated. [place] frequently [place]. Reports no energy, legs weak and dizzy. Can't breath very good . But doesn't feel she has copd.    No problem eating. Denies abdominal pain, n/v/diarrhea. Reports abdominal pain every morning at 5 am. Takes her pain medication and it resolved for the entire day.  Hurts in periumbilical region, sharp, penetrating pain. Heating pad does not help. Has been happening for months. Sx began 4 mon ago. Has not discussed with MD until now. Denies blood in stool. No urinary symptoms. Is cold  Intolerant. Sees Dr. Petit for MS, last seen in spring.    Denies chest pain, has high heart rate at times. No palpitations. BP runs high at DrDomenic kennedy    Past Medical History:   Diagnosis Date   • Acid reflux    • Anxiety    • Arthritis    • Cataract    • CKD (chronic kidney disease) stage 2, GFR 60-89 ml/min 2018   • Colon polyp    • Depression    • GERD (gastroesophageal reflux disease)    • Headache    • Heart murmur    • History of blood transfusion 1971    after surgery    • Hypertension 2018   • Infectious viral hepatitis    • Mild aortic regurgitation 01/03/2019   • MS (multiple sclerosis) (CMS/HCC)    • Pneumonia    • Prediabetes 12/12/2019       Past Surgical History:   Procedure Laterality Date   • ANKLE OPEN REDUCTION INTERNAL FIXATION Left 12/28/2019    Procedure: ANKLE OPEN REDUCTION INTERNAL FIXATION;  Surgeon: Stan Lozoya MD;  [facility];  Service: Orthopedics   • CATARACT EXTRACTION  2009   • CERVICAL BIOPSY  1971    benign   • HEMORRHOIDECTOMY  1971   • TUBAL ABDOMINAL LIGATION  1985        Family History   Problem Relation Age of Onset   • Thyroid disease Mother    • Hyperlipidemia Sister    • Thyroid disease Sister    • Other Sister         parathyroid disease   • Hypertension Brother    • Migraines Brother    • Other Brother         kidney stones   • Other Niece         parathyroid disease   • Breast cancer Neg Hx    • Ovarian cancer Neg Hx        Social History     Socioeconomic History   • Marital status:      Spouse name: Not on file   • Number of children: Not on file   • Years of education: Not on file   • Highest education level: Not on file   Tobacco Use   • Smoking status: Light Tobacco Smoker     Packs/day: 0.50     Types:  "Cigarettes   • Smokeless tobacco: Never Used   Substance and Sexual Activity   • Alcohol use: No   • Drug use: No   • Sexual activity: Defer       Allergies   Allergen Reactions   • Penicillins Anaphylaxis   • Lisinopril Rash and Cough       Review of Systems:    Review of Systems   Constitutional: Positive for activity change, fatigue and unexpected weight gain. Negative for appetite change and fever.   HENT: Negative.    Eyes: Negative.    Respiratory: Positive for cough, shortness of breath and wheezing.    Cardiovascular: Negative for chest pain, palpitations and leg swelling.   Gastrointestinal: Positive for abdominal pain. Negative for blood in stool, constipation, diarrhea and nausea.   Endocrine: Positive for cold intolerance. Negative for polydipsia, polyphagia and polyuria.   Genitourinary: Negative.    Musculoskeletal: Positive for arthralgias.   Skin: Negative.    Neurological: Positive for dizziness and weakness.   Hematological: Negative for adenopathy. Does not bruise/bleed easily.   Psychiatric/Behavioral: Positive for depressed mood. The patient is nervous/anxious.        Vitals:    03/22/21 1433   BP: 160/96   Pulse: 101   SpO2: 96%   Weight: 47.2 kg (104 lb)   Height: 157.5 cm (62.01\")     Body mass index is 19.02 kg/m².      Current Outpatient Medications:   •  HYDROcodone-acetaminophen (NORCO)  MG per tablet, , Disp: , Rfl:   •  losartan (COZAAR) 25 MG tablet, TAKE 1 TABLET BY MOUTH EVERY DAY, Disp: 30 tablet, Rfl: 0  •  OXcarbazepine (TRILEPTAL) 300 MG tablet, TAKE 1 TABLET BY MOUTH TWICE A DAY, Disp: 180 tablet, Rfl: 3  •  polyethylene glycol (MIRALAX) powder, Take 17 g by mouth Daily., Disp: 1 each, Rfl: 11  •  QUEtiapine (SEROquel) 100 MG tablet, Take 1 tablet by mouth Every Night., Disp: 90 tablet, Rfl: 6  •  tiZANidine (ZANAFLEX) 4 MG tablet, TAKE 1 TABLET BY MOUTH TWICE A DAY, Disp: 180 tablet, Rfl: 0  •  albuterol sulfate  (90 Base) MCG/ACT inhaler, Inhale 2 puffs Every 4 " (Four) Hours As Needed for Wheezing., Disp: 17 g, Rfl: 6    Physical Exam:    Physical Exam  Vitals and nursing note reviewed.   Constitutional:       General: She is not in acute distress.     Appearance: She is well-developed. She is not diaphoretic.      Comments: underweight   HENT:      Head: Normocephalic and atraumatic.      Right Ear: External ear normal.      Left Ear: Tympanic membrane and external ear normal.      Nose: Nose normal.      Mouth/Throat:      Mouth: Mucous membranes are moist.      Pharynx: Oropharynx is clear. No oropharyngeal exudate.   Eyes:      General: No scleral icterus.        Right eye: No discharge.         Left eye: No discharge.      Conjunctiva/sclera: Conjunctivae normal.   Neck:      Thyroid: No thyromegaly.      Vascular: No JVD.      Trachea: No tracheal deviation.   Cardiovascular:      Rate and Rhythm: Normal rate and regular rhythm.      Pulses: Normal pulses.      Heart sounds: Normal heart sounds.      Comments: PMI nondisplaced  Pulmonary:      Effort: Pulmonary effort is normal. No respiratory distress.      Breath sounds: Rhonchi present. No wheezing or rales.      Comments: Rhonchi and wheezing bilaterally.  Prolonged expiratory phase.  Chest:      Chest wall: No tenderness.   Abdominal:      General: Bowel sounds are normal.      Palpations: Abdomen is soft. There is no mass.      Tenderness: There is no abdominal tenderness. There is no guarding or rebound.      Comments: Generalized tenderness in the bilateral lower quadrants worsened midline.  No rebound   Musculoskeletal:         General: No swelling or deformity.      Cervical back: Normal range of motion and neck supple.      Right lower leg: No edema.      Left lower leg: No edema.      Comments: Normal gait    Lymphadenopathy:      Cervical: No cervical adenopathy.   Skin:     General: Skin is warm and dry.      Capillary Refill: Capillary refill takes less than 2 seconds.      Coloration: Skin is not pale.       Findings: No rash.   Neurological:      Mental Status: She is alert and oriented to person, place, and time.      Motor: No abnormal muscle tone.      Coordination: Coordination normal.   Psychiatric:         Judgment: Judgment normal.         Procedures    Results Review:    None    Assessment/Plan:  This is a 70-year-old female smoker reports difficulty gaining weight despite a voracious appetite.  Differential is quite broad.  I do believe she has underlying COPD and may have some pulmonary cachexia with this.  She is in denial over the degree of her pulmonary involvement with her smoking history.  She does not believe she has COPD however agrees to a pulmonary evaluation.  Given her complaints and risk factors I recommended a CT of her chest in addition to pulmonary functions.  Given her abdominal pain and her complaints of recommended oral contrast CT.  Labs have been ordered including CBC CMP TSH B12 and hemoglobin A1c given her history of prediabetes.  Patient vehemently declines going to a counselor regarding depression or anxiety and does not feel that this is her underlying history.  Problem List Items Addressed This Visit        Endocrine and Metabolic    Weight loss    Relevant Orders    CT Abdomen Pelvis Without Contrast    CT Chest Without Contrast    CBC & Differential    Comprehensive Metabolic Panel    TSH Rfx On Abnormal To Free T4    Hemoglobin A1c       Pulmonary and Pneumonias    Cough - Primary    Relevant Orders    Ambulatory Referral to Pulmonology    Full Pulmonary Function Test With Bronchodilator    CT Abdomen Pelvis Without Contrast    CT Chest Without Contrast    CBC & Differential      Other Visit Diagnoses     Fatigue, unspecified type        Relevant Orders    CBC & Differential    Comprehensive Metabolic Panel    TSH Rfx On Abnormal To Free T4    Vitamin B12    Hemoglobin A1c    Periumbilical abdominal pain        Relevant Orders    CBC & Differential    Pre-diabetes         Relevant Orders    Hemoglobin A1c          Plan of care reviewed with patient at the conclusion of today's visit. Education was provided regarding diagnosis and management.  Patient verbalizes understanding of and agreement with management plan.    Return in about 2 months (around 5/22/2021).    Kris Malin MD    Please note that portions of this note may have been completed with a voice recognition program. Efforts were made to edit the dictations, but occasionally words are mistranscribed.

## 2021-04-09 ENCOUNTER — PATIENT OUTREACH (OUTPATIENT)
Dept: CASE MANAGEMENT | Facility: OTHER | Age: 71
End: 2021-04-09

## 2021-04-09 NOTE — OUTREACH NOTE
"Patient Outreach Note    Called patient in follow up for HRCM.  Patient stated she is \"hanging in there\".  Talked of recent family deaths and grief and sadness. Supportive listening provided; offered sincere condolences.  Stated she has been eating well and drinking a lot of water.  Reports her weight stays around 103-106 lbs.  Said she is taking her medications daily as ordered.  Reported she continues to have abdominal pain around 4am every morning and it goes away about mid-morning.  Stated she is tired of feeling bad. Discussed importance of exercise and getting outside on pretty days.  Patient said she is doing this.  She talked of support system with her son and family; good neighbors.  Reviewed upcoming tests that are scheduled. Patient stated she saw Dr. Malin (PCP) on 3/22/21 and he has ordered tests for her to get.  Patient said she has them all on her calendar and plans to be at each.  No other questions or concerns voiced at this time.      Kecia Allen RN  Ambulatory     4/9/2021, 16:09 EDT      "

## 2021-04-12 ENCOUNTER — HOSPITAL ENCOUNTER (OUTPATIENT)
Dept: CT IMAGING | Facility: HOSPITAL | Age: 71
Discharge: HOME | End: 2021-04-12
Admitting: INTERNAL MEDICINE

## 2021-04-12 DIAGNOSIS — R05.9 COUGH: ICD-10-CM

## 2021-04-12 DIAGNOSIS — R63.4 WEIGHT LOSS: ICD-10-CM

## 2021-04-12 PROCEDURE — 74176 CT ABD & PELVIS W/O CONTRAST: CPT

## 2021-04-12 PROCEDURE — 71250 CT THORAX DX C-: CPT

## 2021-04-13 DIAGNOSIS — I10 ESSENTIAL HYPERTENSION: ICD-10-CM

## 2021-04-13 RX ORDER — LOSARTAN POTASSIUM 25 MG/1
TABLET ORAL
Qty: 30 TABLET | Refills: 0 | OUTPATIENT
Start: 2021-04-13

## 2021-04-13 NOTE — TELEPHONE ENCOUNTER
Patient states she is ok and does not need refills on this right now, however when she does she will contact her PCP, . Sounds good!

## 2021-04-19 ENCOUNTER — OFFICE VISIT (OUTPATIENT)
Dept: NEUROLOGY | Facility: CLINIC | Age: 71
End: 2021-04-19

## 2021-04-19 VITALS
HEIGHT: 62 IN | SYSTOLIC BLOOD PRESSURE: 156 MMHG | HEART RATE: 94 BPM | BODY MASS INDEX: 19.77 KG/M2 | DIASTOLIC BLOOD PRESSURE: 74 MMHG | OXYGEN SATURATION: 96 % | WEIGHT: 107.4 LBS

## 2021-04-19 DIAGNOSIS — F32.9 REACTIVE DEPRESSION: Chronic | ICD-10-CM

## 2021-04-19 DIAGNOSIS — M79.2 NEUROPATHIC PAIN: Chronic | ICD-10-CM

## 2021-04-19 DIAGNOSIS — G35 MS (MULTIPLE SCLEROSIS) (HCC): Primary | Chronic | ICD-10-CM

## 2021-04-19 PROCEDURE — 99214 OFFICE O/P EST MOD 30 MIN: CPT | Performed by: PSYCHIATRY & NEUROLOGY

## 2021-04-19 RX ORDER — MIRTAZAPINE 30 MG/1
30 TABLET, FILM COATED ORAL NIGHTLY
Qty: 30 TABLET | Refills: 2 | Status: SHIPPED | OUTPATIENT
Start: 2021-04-19 | End: 2021-07-13

## 2021-04-19 NOTE — PROGRESS NOTES
"Chief Complaint    Multiple Sclerosis    Subjective          Angela Diaz Parent presents to [facility] for RRMS.    History of Present Illness    70 y.o. female returns in follow up.  Last visit on 2/28/20 continued Zanaflex and OXC.      Admitted to [facility] 12/27/19 - 1/1/2020 for closed trimalleolar left peter fx. Fell getting OOB.  Surgery Dr Lozoya.      May 29, 2020 removed hardware.  Long time to heal and lost 20 lbs.  Able to bear weight on left leg.  Numbness below knee on left leg.       Wt loss and fatigue.       OXC and flexor spasms improved.      Hep C positive but inactive.      Problem history:     Dx in 1997 previously followed by Dr Jensen and Taty Montano.   Treated with Rebif two different times for 5 years and off for a year then back on for 7 - 9 years.  Stopped Rebif two months ago.       Last MRI Brain 18 months ago.  Anxiety for small spaces.       Fatigue is severe.       Pain in heads with sharp shooting episodes.  Sits on head and puts them in hot water.  Movement worsens pain.  Sharp shooting pains down shins once a week.       Gait unsteady.       Attention and concentration decreasing.       Reviewed medical records:     Followed by pain management treated with narcotics.  Complains of pain in hands.  Taking Xanax for anxiety referred to Psychiatry.       Hep C ab reactive; quantitation not detected.     Objective   Vital Signs:   /74   Pulse 94   Ht 157.5 cm (62.01\")   Wt 48.7 kg (107 lb 6.4 oz)   SpO2 96%   BMI 19.64 kg/m²     Physical Exam  Eyes:      Extraocular Movements: EOM normal.      Pupils: Pupils are equal, round, and reactive to light.   Neurological:      Mental Status: She is oriented to person, place, and time.      Gait: Gait is intact.      Deep Tendon Reflexes: Strength normal.   Psychiatric:         Speech: Speech normal.          Neurologic Exam     Mental Status   Oriented to person, place, and time.   Speech: speech is normal   Level " of consciousness: alert  Knowledge: good and consistent with education.   Normal comprehension.     Cranial Nerves   Cranial nerves II through XII intact.     CN II   Visual fields full to confrontation.   Visual acuity: normal  Right visual field deficit: none  Left visual field deficit: none     CN III, IV, VI   Pupils are equal, round, and reactive to light.  Extraocular motions are normal.   Nystagmus: none   Diplopia: none  Ophthalmoparesis: none  Upgaze: normal  Downgaze: normal  Conjugate gaze: present    CN V   Facial sensation intact.   Right corneal reflex: normal  Left corneal reflex: normal    CN VII   Right facial weakness: none  Left facial weakness: none    CN VIII   Hearing: intact    CN IX, X   Palate: symmetric  Right gag reflex: normal  Left gag reflex: normal    CN XI   Right sternocleidomastoid strength: normal  Left sternocleidomastoid strength: normal    CN XII   Tongue: not atrophic  Fasciculations: absent  Tongue deviation: none    Motor Exam   Muscle bulk: normal  Overall muscle tone: normal    Strength   Strength 5/5 throughout.     Sensory Exam   Light touch normal.   Sensory deficit distribution on left: sciatic    Gait, Coordination, and Reflexes     Gait  Gait: normal    Tremor   Resting tremor: absent  Intention tremor: absent  Action tremor: absent    Reflexes   Reflexes 2+ except as noted.      Result Review :   The following data was reviewed by: Mehdi Petit MD on 04/19/2021:  Common labs    Common Labsle 5/27/20 5/27/20 5/27/20 11/2/20 11/2/20    1012 1012 1012 1112 1112   Glucose   109 (A)  105 (A)   BUN   13  12   Creatinine   0.83  0.75   eGFR Non African Am   68  76   Sodium   132 (A)  132 (A)   Potassium   4.2  5.1   Chloride   94 (A)  95 (A)   Calcium   10.2  10.5   Albumin     5.00   Total Bilirubin     0.3   Alkaline Phosphatase     98   AST (SGOT)     21   ALT (SGPT)     17   WBC  9.47  9.46    Hemoglobin  13.7  14.4    Hematocrit  40.8  41.5    Platelets  440  412     Hemoglobin A1C 6.00 (A)       (A) Abnormal value                      Assessment and Plan    Diagnoses and all orders for this visit:    1. MS (multiple sclerosis) (CMS/[facility]) (Primary)  Assessment & Plan:  No new or worsening off DMT          2. Reactive depression  Assessment & Plan:  Started Remeron 30 mg qhs       3. Neuropathic pain  Assessment & Plan:  Continue OXC and Zanaflex       Other orders  -     mirtazapine (Remeron) 30 MG tablet; Take 1 tablet by mouth Every Night.  Dispense: 30 tablet; Refill: 2      Follow Up   No follow-ups on file.  Patient was given instructions and counseling regarding her condition or for health maintenance advice. Please see specific information pulled into the AVS if appropriate.

## 2021-04-26 ENCOUNTER — LAB (OUTPATIENT)
Dept: LAB | Facility: HOSPITAL | Age: 71
End: 2021-04-26

## 2021-04-26 DIAGNOSIS — R10.33 PERIUMBILICAL ABDOMINAL PAIN: ICD-10-CM

## 2021-04-26 DIAGNOSIS — R53.83 FATIGUE, UNSPECIFIED TYPE: ICD-10-CM

## 2021-04-26 DIAGNOSIS — R73.03 PRE-DIABETES: ICD-10-CM

## 2021-04-26 DIAGNOSIS — R05.9 COUGH: ICD-10-CM

## 2021-04-26 DIAGNOSIS — R63.4 WEIGHT LOSS: ICD-10-CM

## 2021-04-26 LAB
BASOPHILS # BLD AUTO: 0.03 10*3/MM3 (ref 0–0.2)
BASOPHILS NFR BLD AUTO: 0.3 % (ref 0–1.5)
DEPRECATED RDW RBC AUTO: 43.5 FL (ref 37–54)
EOSINOPHIL # BLD AUTO: 0.14 10*3/MM3 (ref 0–0.4)
EOSINOPHIL NFR BLD AUTO: 1.6 % (ref 0.3–6.2)
ERYTHROCYTE [DISTWIDTH] IN BLOOD BY AUTOMATED COUNT: 12 % (ref 12.3–15.4)
HBA1C MFR BLD: 5.65 % (ref 4.8–5.6)
HCT VFR BLD AUTO: 39.7 % (ref 34–46.6)
HGB BLD-MCNC: 13.4 G/DL (ref 12–15.9)
IMM GRANULOCYTES # BLD AUTO: 0.04 10*3/MM3 (ref 0–0.05)
IMM GRANULOCYTES NFR BLD AUTO: 0.5 % (ref 0–0.5)
LYMPHOCYTES # BLD AUTO: 2.96 10*3/MM3 (ref 0.7–3.1)
LYMPHOCYTES NFR BLD AUTO: 34.4 % (ref 19.6–45.3)
MCH RBC QN AUTO: 33.1 PG (ref 26.6–33)
MCHC RBC AUTO-ENTMCNC: 33.8 G/DL (ref 31.5–35.7)
MCV RBC AUTO: 98 FL (ref 79–97)
MONOCYTES # BLD AUTO: 0.66 10*3/MM3 (ref 0.1–0.9)
MONOCYTES NFR BLD AUTO: 7.7 % (ref 5–12)
NEUTROPHILS NFR BLD AUTO: 4.77 10*3/MM3 (ref 1.7–7)
NEUTROPHILS NFR BLD AUTO: 55.5 % (ref 42.7–76)
NRBC BLD AUTO-RTO: 0 /100 WBC (ref 0–0.2)
PLATELET # BLD AUTO: 427 10*3/MM3 (ref 140–450)
PMV BLD AUTO: 9.1 FL (ref 6–12)
RBC # BLD AUTO: 4.05 10*6/MM3 (ref 3.77–5.28)
VIT B12 BLD-MCNC: 297 PG/ML (ref 211–946)
WBC # BLD AUTO: 8.6 10*3/MM3 (ref 3.4–10.8)

## 2021-04-26 PROCEDURE — 80053 COMPREHEN METABOLIC PANEL: CPT

## 2021-04-26 PROCEDURE — 85025 COMPLETE CBC W/AUTO DIFF WBC: CPT

## 2021-04-26 PROCEDURE — 83036 HEMOGLOBIN GLYCOSYLATED A1C: CPT

## 2021-04-26 PROCEDURE — 84443 ASSAY THYROID STIM HORMONE: CPT

## 2021-04-26 PROCEDURE — 82607 VITAMIN B-12: CPT

## 2021-04-27 LAB
ALBUMIN SERPL-MCNC: 4.6 G/DL (ref 3.5–5.2)
ALBUMIN/GLOB SERPL: 1.7 G/DL
ALP SERPL-CCNC: 98 U/L (ref 39–117)
ALT SERPL W P-5'-P-CCNC: 17 U/L (ref 1–33)
ANION GAP SERPL CALCULATED.3IONS-SCNC: 11.4 MMOL/L (ref 5–15)
AST SERPL-CCNC: 17 U/L (ref 1–32)
BILIRUB SERPL-MCNC: <0.2 MG/DL (ref 0–1.2)
BUN SERPL-MCNC: 12 MG/DL (ref 8–23)
BUN/CREAT SERPL: 15.6 (ref 7–25)
CALCIUM SPEC-SCNC: 9.7 MG/DL (ref 8.6–10.5)
CHLORIDE SERPL-SCNC: 92 MMOL/L (ref 98–107)
CO2 SERPL-SCNC: 27.6 MMOL/L (ref 22–29)
CREAT SERPL-MCNC: 0.77 MG/DL (ref 0.57–1)
GFR SERPL CREATININE-BSD FRML MDRD: 74 ML/MIN/1.73
GLOBULIN UR ELPH-MCNC: 2.7 GM/DL
GLUCOSE SERPL-MCNC: 166 MG/DL (ref 65–99)
POTASSIUM SERPL-SCNC: 4.5 MMOL/L (ref 3.5–5.2)
PROT SERPL-MCNC: 7.3 G/DL (ref 6–8.5)
SODIUM SERPL-SCNC: 131 MMOL/L (ref 136–145)
TSH SERPL DL<=0.05 MIU/L-ACNC: 2.4 UIU/ML (ref 0.27–4.2)

## 2021-04-30 DIAGNOSIS — Z01.812 BLOOD TESTS PRIOR TO TREATMENT OR PROCEDURE: Primary | ICD-10-CM

## 2021-05-02 DIAGNOSIS — I10 ESSENTIAL HYPERTENSION: ICD-10-CM

## 2021-05-03 RX ORDER — LOSARTAN POTASSIUM 25 MG/1
TABLET ORAL
Qty: 30 TABLET | Refills: 0 | OUTPATIENT
Start: 2021-05-03

## 2021-06-14 ENCOUNTER — PATIENT OUTREACH (OUTPATIENT)
Dept: CASE MANAGEMENT | Facility: OTHER | Age: 71
End: 2021-06-14

## 2021-06-14 NOTE — OUTREACH NOTE
Ambulatory Case Management Note    Patient Outreach    Called patient in follow up for HRCM.  Patient said she is doing better; she has seen a Pulmonary doctor, saying everything checked out good.  Stated she is feeling better, and getting outside.  Patient stated she still has anxiety attacks at times, for no apparent reason; she feels better physically, not mentally.  Discussed with patient importance of communicating this with her PCP and getting his recommendations.  Reviewed next appt is scheduled for 7-8-21; she also needs a Medicare AWV appts scheduled.  Patient said she is comfortable calling the PCP office and will call to see if PCP has an opening before 7/8 to discuss panic attacks, and go on and also schedule her AWV appt. For later on.  Patient voiced appreciation for the call; no questions or concerns voiced at this time.  Reminded patient if needs or concerns arise, she can call RN-ACM if she wants.      There are no recently modified care plans to display for this patient.      Kecia Allen RN  Ambulatory Case Management    6/14/2021, 14:32 EDT

## 2021-06-23 RX ORDER — QUETIAPINE FUMARATE 100 MG/1
TABLET, FILM COATED ORAL
Qty: 90 TABLET | Refills: 6 | Status: SHIPPED | OUTPATIENT
Start: 2021-06-23 | End: 2022-07-26

## 2021-06-28 DIAGNOSIS — Z01.812 BLOOD TESTS PRIOR TO TREATMENT OR PROCEDURE: Primary | ICD-10-CM

## 2021-06-29 ENCOUNTER — CLINICAL SUPPORT NO REQUIREMENTS (OUTPATIENT)
Dept: PULMONOLOGY | Facility: CLINIC | Age: 71
End: 2021-06-29

## 2021-06-29 DIAGNOSIS — Z01.812 BLOOD TESTS PRIOR TO TREATMENT OR PROCEDURE: ICD-10-CM

## 2021-06-29 PROCEDURE — U0004 COV-19 TEST NON-CDC HGH THRU: HCPCS | Performed by: INTERNAL MEDICINE

## 2021-06-29 PROCEDURE — 99211 OFF/OP EST MAY X REQ PHY/QHP: CPT | Performed by: INTERNAL MEDICINE

## 2021-06-29 PROCEDURE — U0005 INFEC AGEN DETEC AMPLI PROBE: HCPCS | Performed by: INTERNAL MEDICINE

## 2021-06-30 LAB — SARS-COV-2 RNA NOSE QL NAA+PROBE: NOT DETECTED

## 2021-07-01 ENCOUNTER — OFFICE VISIT (OUTPATIENT)
Dept: PULMONOLOGY | Facility: CLINIC | Age: 71
End: 2021-07-01

## 2021-07-01 DIAGNOSIS — Z87.891 HISTORY OF TOBACCO USE: Primary | ICD-10-CM

## 2021-07-01 NOTE — PROGRESS NOTES
New Patient Pulmonary Office Visit      Patient Name: Angela Warren    Referring Physician: Kris Malin MD    Chief Complaint:  No chief complaint on file.      History of Present Illness: Angela Warren is a 70 y.o. female who is here today to establish care with Pulmonary.  Patient has a past medical history significant for reflux, multiple sclerosis, tobacco abuse, depression, hypertension, and mild aortic regurg.  Who presents to pulmonary for evaluation of a cough.    Review of Systems:   Review of Systems   Constitutional: Negative for activity change, appetite change, chills and diaphoresis.   HENT: Negative for congestion, postnasal drip, sinus pressure and voice change.    Eyes: Negative for blurred vision.   Respiratory: Positive for cough. Negative for shortness of breath and wheezing.    Cardiovascular: Negative for chest pain.   Gastrointestinal: Negative for abdominal pain.   Musculoskeletal: Negative for myalgias.   Skin: Negative for color change and dry skin.   Allergic/Immunologic: Negative for environmental allergies.   Neurological: Negative for weakness and confusion.   Hematological: Negative for adenopathy.   Psychiatric/Behavioral: Negative for sleep disturbance and depressed mood.       Past Medical History:   Past Medical History:   Diagnosis Date   • Acid reflux    • Anxiety    • Arthritis    • Cataract    • CKD (chronic kidney disease) stage 2, GFR 60-89 ml/min 2018   • Colon polyp    • Depression    • GERD (gastroesophageal reflux disease)    • Headache    • Heart murmur    • History of blood transfusion 1971    after surgery    • Hypertension 2018   • Infectious viral hepatitis    • Mild aortic regurgitation 01/03/2019   • MS (multiple sclerosis) (CMS/[facility])    • Pneumonia    • Prediabetes 12/12/2019       Past Surgical History:   Past Surgical History:   Procedure Laterality Date   • ANKLE OPEN REDUCTION INTERNAL FIXATION Left 12/28/2019    Procedure: ANKLE OPEN REDUCTION  INTERNAL FIXATION;  Surgeon: Stan Lozoya MD;  Location: [facility];  Service: Orthopedics   • CATARACT EXTRACTION  2009   • CERVICAL BIOPSY  1971    benign   • HEMORRHOIDECTOMY  1971   • TUBAL ABDOMINAL LIGATION  1985       Family History:   Family History   Problem Relation Age of Onset   • Thyroid disease Mother    • Hyperlipidemia Sister    • Thyroid disease Sister    • Other Sister         parathyroid disease   • Hypertension Brother    • Migraines Brother    • Other Brother         kidney stones   • Other Niece         parathyroid disease   • Breast cancer Neg Hx    • Ovarian cancer Neg Hx        Social History:   Social History     Socioeconomic History   • Marital status:      Spouse name: Not on file   • Number of children: Not on file   • Years of education: Not on file   • Highest education level: Not on file   Tobacco Use   • Smoking status: Light Tobacco Smoker     Packs/day: 0.50     Types: Cigarettes   • Smokeless tobacco: Never Used   Vaping Use   • Vaping Use: Never used   Substance and Sexual Activity   • Alcohol use: No   • Drug use: No   • Sexual activity: Defer       Medications:     Current Outpatient Medications:   •  albuterol sulfate  (90 Base) MCG/ACT inhaler, Inhale 2 puffs Every 4 (Four) Hours As Needed for Wheezing., Disp: 17 g, Rfl: 6  •  HYDROcodone-acetaminophen (NORCO)  MG per tablet, Every 6 (Six) Hours As Needed., Disp: , Rfl:   •  losartan (COZAAR) 25 MG tablet, TAKE 1 TABLET BY MOUTH EVERY DAY, Disp: 30 tablet, Rfl: 0  •  mirtazapine (Remeron) 30 MG tablet, Take 1 tablet by mouth Every Night., Disp: 30 tablet, Rfl: 2  •  OXcarbazepine (TRILEPTAL) 300 MG tablet, TAKE 1 TABLET BY MOUTH TWICE A DAY, Disp: 180 tablet, Rfl: 3  •  polyethylene glycol (MIRALAX) powder, Take 17 g by mouth Daily., Disp: 1 each, Rfl: 11  •  QUEtiapine (SEROquel) 100 MG tablet, TAKE 1 TABLET BY MOUTH EVERY DAY AT NIGHT, Disp: 90 tablet, Rfl: 6  •  tiZANidine (ZANAFLEX) 4 MG  tablet, TAKE 1 TABLET BY MOUTH TWICE A DAY, Disp: 180 tablet, Rfl: 0    Allergies:   Allergies   Allergen Reactions   • Penicillins Anaphylaxis   • Lisinopril Rash and Cough       Physical Exam:  Vital Signs: There were no vitals filed for this visit.    Physical Exam  Vitals and nursing note reviewed.   Constitutional:       General: She is not in acute distress.     Appearance: She is well-developed and normal weight. She is not ill-appearing or toxic-appearing.   HENT:      Head: Normocephalic and atraumatic.   Cardiovascular:      Rate and Rhythm: Normal rate and regular rhythm.      Pulses: Normal pulses.      Heart sounds: Normal heart sounds. No murmur heard.   No friction rub. No gallop.    Pulmonary:      Effort: Pulmonary effort is normal. No respiratory distress.      Breath sounds: Normal breath sounds. No wheezing, rhonchi or rales.   Musculoskeletal:      Right lower leg: No edema.      Left lower leg: No edema.   Skin:     General: Skin is warm and dry.   Neurological:      Mental Status: She is alert and oriented to person, place, and time.           There is no immunization history on file for this patient.    Results Review:   - I personally reviewed the pts imaging from CT scan of the chest from 4/12/2021 shows no acute infiltrates, nodules, or masses.   - I personally reviewed the pts labs from 4/26/2020 laboratory CBC notable for a hemoglobin of 13.4, normal white blood cell count, normal platelet count, otherwise unremarkable and a CMP notable for sodium level of 131, glucose elevation of 166, chloride 92  - I personally reviewed the pts PFT from ***  - I personally reviewed the pts Echo from 1/3/2019 showed EF of 61 to 65% with mild aortic valve regurg  - I personally reviewed the pts chart with regards to evaluation by her PCP and neurologist a history of MS.    Assessment / Plan:   There are no diagnoses linked to this encounter.    Follow Up:   No follow-ups on file.     MARY Waddell  DO  Pulmonary and Critical Care Medicine  Note Electronically Signed    Please note that portions of this note may have been completed with a voice recognition program. Efforts were made to edit the dictations, but occasionally words are mistranscribed.

## 2021-07-09 ENCOUNTER — OFFICE VISIT (OUTPATIENT)
Dept: FAMILY MEDICINE CLINIC | Facility: CLINIC | Age: 71
End: 2021-07-09

## 2021-07-09 VITALS
WEIGHT: 105.6 LBS | HEART RATE: 88 BPM | DIASTOLIC BLOOD PRESSURE: 82 MMHG | BODY MASS INDEX: 19.43 KG/M2 | OXYGEN SATURATION: 96 % | HEIGHT: 62 IN | SYSTOLIC BLOOD PRESSURE: 160 MMHG

## 2021-07-09 DIAGNOSIS — F40.01 AGORAPHOBIA WITH PANIC ATTACKS: Primary | ICD-10-CM

## 2021-07-09 DIAGNOSIS — K21.9 GERD WITHOUT ESOPHAGITIS: ICD-10-CM

## 2021-07-09 DIAGNOSIS — I10 ESSENTIAL HYPERTENSION: ICD-10-CM

## 2021-07-09 DIAGNOSIS — F41.9 ANXIETY: ICD-10-CM

## 2021-07-09 DIAGNOSIS — R63.4 WEIGHT LOSS: ICD-10-CM

## 2021-07-09 DIAGNOSIS — R53.82 CHRONIC FATIGUE: ICD-10-CM

## 2021-07-09 PROCEDURE — 99214 OFFICE O/P EST MOD 30 MIN: CPT | Performed by: INTERNAL MEDICINE

## 2021-07-09 RX ORDER — BISOPROLOL FUMARATE 10 MG/1
10 TABLET, FILM COATED ORAL DAILY
Qty: 30 TABLET | Refills: 9 | Status: SHIPPED | OUTPATIENT
Start: 2021-07-09 | End: 2022-04-08

## 2021-07-09 RX ORDER — PAROXETINE HYDROCHLORIDE 20 MG/1
20 TABLET, FILM COATED ORAL EVERY MORNING
Qty: 30 TABLET | Refills: 6 | Status: SHIPPED | OUTPATIENT
Start: 2021-07-09 | End: 2021-10-05

## 2021-07-09 NOTE — PROGRESS NOTES
"Angela Warren  1950  [id number]  Patient Care Team:  Kris Malin MD as PCP - General (Internal Medicine)  Justin Cardoso MD as Consulting Physician (Ophthalmology)  Justin Parrish MD as Consulting Physician (Dermatology)  Derrick Harding II, MD (Pain Medicine)  Stan Lozoya MD as Consulting Physician (Orthopedic Surgery)  Mehdi Petit MD as Consulting Physician (Neurology)  Kecia Allen RN as Ambulatory  ([facility])    Angela Warren is a 70 y.o. female here today for follow up.     This patient is accompanied by their self who contributes to the history of their care.    Chief Complaint:    Chief Complaint   Patient presents with   • Anxiety     Follow up, It is worse than last visit   • Hypertension     Stopped taking medication because she states it does not work.         History of Present Illness:  I have reviewed and/or updated the patient's past medical, past surgical, family, social history, problem list and allergies as appropriate.    started on remeron my neurology. Is now anxious and fearful, terrified to leave her home.  Has tried buspar in late 90s. lexapro effexor. Without benefit. Had been treated with xanax with success in past.  Does not drink alcohol.  Feels she is very good.  Despite this she has significant fear.  Stopped her blood pressure Cozaar secondary to \"not working\".  Has gained 5 lbs with use of remeron. Sees  Pulmonary upcoming.  Maintains chronic cough.  Continues to take albuterol with some relief.    Review of Systems:    Review of Systems   Constitutional: Positive for fatigue and unexpected weight gain.   HENT: Negative.    Respiratory: Positive for cough and shortness of breath.    Cardiovascular: Negative for chest pain and palpitations.   Gastrointestinal: Negative.    Endocrine: Negative for cold intolerance, heat intolerance, polydipsia and polyuria.   Musculoskeletal: Positive for back pain.   Neurological: " "Negative for headache.   Hematological: Negative for adenopathy. Does not bruise/bleed easily.   Psychiatric/Behavioral: Positive for sleep disturbance and depressed mood. Negative for self-injury. The patient is nervous/anxious.        Vitals:    07/09/21 1132   BP: 160/82   Pulse: 88   SpO2: 96%   Weight: 47.9 kg (105 lb 9.6 oz)   Height: 157.5 cm (62.01\")   PainSc:   6   PainLoc: Ankle     Body mass index is 19.31 kg/m².      Current Outpatient Medications:   •  albuterol sulfate  (90 Base) MCG/ACT inhaler, Inhale 2 puffs Every 4 (Four) Hours As Needed for Wheezing., Disp: 17 g, Rfl: 6  •  HYDROcodone-acetaminophen (NORCO)  MG per tablet, Every 6 (Six) Hours As Needed., Disp: , Rfl:   •  mirtazapine (Remeron) 30 MG tablet, Take 1 tablet by mouth Every Night., Disp: 30 tablet, Rfl: 2  •  OXcarbazepine (TRILEPTAL) 300 MG tablet, TAKE 1 TABLET BY MOUTH TWICE A DAY, Disp: 180 tablet, Rfl: 3  •  polyethylene glycol (MIRALAX) powder, Take 17 g by mouth Daily., Disp: 1 each, Rfl: 11  •  QUEtiapine (SEROquel) 100 MG tablet, TAKE 1 TABLET BY MOUTH EVERY DAY AT NIGHT, Disp: 90 tablet, Rfl: 6  •  tiZANidine (ZANAFLEX) 4 MG tablet, TAKE 1 TABLET BY MOUTH TWICE A DAY, Disp: 180 tablet, Rfl: 0  •  bisoprolol (ZEBeta) 10 MG tablet, Take 1 tablet by mouth Daily., Disp: 30 tablet, Rfl: 9  •  PARoxetine (Paxil) 20 MG tablet, Take 1 tablet by mouth Every Morning., Disp: 30 tablet, Rfl: 6    Physical Exam:    Physical Exam  Vitals and nursing note reviewed.   Constitutional:       General: She is not in acute distress.     Appearance: She is well-developed. She is not ill-appearing, toxic-appearing or diaphoretic.      Comments: Anxious   HENT:      Head: Normocephalic and atraumatic.      Right Ear: Tympanic membrane and external ear normal.      Left Ear: Tympanic membrane and external ear normal.      Mouth/Throat:      Mouth: Mucous membranes are moist.      Pharynx: No oropharyngeal exudate.   Eyes:      General: " No scleral icterus.        Right eye: No discharge.         Left eye: No discharge.      Extraocular Movements: Extraocular movements intact.      Conjunctiva/sclera: Conjunctivae normal.   Neck:      Thyroid: No thyromegaly.      Vascular: No JVD.      Trachea: No tracheal deviation.   Cardiovascular:      Rate and Rhythm: Normal rate and regular rhythm.      Pulses: Normal pulses.      Heart sounds: Normal heart sounds.      Comments: PMI nondisplaced  Pulmonary:      Effort: Pulmonary effort is normal. Prolonged expiration present.      Breath sounds: Normal breath sounds. No wheezing or rales.   Abdominal:      General: Bowel sounds are normal.      Palpations: Abdomen is soft.      Tenderness: There is no abdominal tenderness. There is no guarding or rebound.   Musculoskeletal:      Cervical back: Normal range of motion and neck supple. No rigidity.      Comments: Normal gait   Lymphadenopathy:      Cervical: No cervical adenopathy.   Skin:     General: Skin is warm and dry.      Capillary Refill: Capillary refill takes less than 2 seconds.      Coloration: Skin is not pale.      Findings: No rash.   Neurological:      Mental Status: She is alert and oriented to person, place, and time.      Motor: No abnormal muscle tone.      Coordination: Coordination normal.   Psychiatric:         Mood and Affect: Mood is anxious. Affect is tearful.         Speech: Speech is rapid and pressured.         Behavior: Behavior normal.         Thought Content: Thought content is not paranoid or delusional. Thought content does not include homicidal or suicidal ideation. Thought content does not include homicidal or suicidal plan.         Cognition and Memory: Cognition is not impaired. Memory is not impaired. She does not exhibit impaired recent memory or impaired remote memory.         Judgment: Judgment normal.         Procedures    Results Review:    I reviewed the patient's new clinical  results.    Assessment/Plan:      Problem List Items Addressed This Visit        Cardiac and Vasculature    Essential hypertension    Current Assessment & Plan     Hypertension is worsening.  Dietary sodium restriction.  Stop smoking.  Medication changes per orders.  Blood pressure will be reassessed at the next regular appointment     Discussed cardioselective Betablocker, hopefully wll help with anxiety as well.         Relevant Medications    bisoprolol (ZEBeta) 10 MG tablet       Endocrine and Metabolic    Weight loss    Current Assessment & Plan      I strongly suspect 2/2 to SEVERE anxiety and underlying copd. This seems to have improved with remeron. I have added paxil 20 mg daily as well for appetite and severe anxiety            Gastrointestinal Abdominal     GERD without esophagitis       Symptoms and Signs    Chronic fatigue    Current Assessment & Plan     2/2 to deconditioning chronic resp faiilure as well as depression/anxiety           Other Visit Diagnoses     Agoraphobia with panic attacks    -  Primary    Relevant Medications    PARoxetine (Paxil) 20 MG tablet    Other Relevant Orders    Ambulatory Referral to Psychiatry    Ambulatory Referral to Psychology    Anxiety        Relevant Orders    Ambulatory Referral to Psychiatry    Ambulatory Referral to Psychology          Plan of care reviewed with patient at the conclusion of today's visit. Education was provided regarding diagnosis and management.  Patient verbalizes understanding of and agreement with management plan.    Return in about 3 months (around 10/9/2021), or hypertension/agoraphobia.    Kris Malin MD    Please note that portions of this note may have been completed with a voice recognition program. Efforts were made to edit the dictations, but occasionally words are mistranscribed.

## 2021-07-10 NOTE — ASSESSMENT & PLAN NOTE
I strongly suspect 2/2 to SEVERE anxiety and underlying copd. This seems to have improved with remeron. I have added paxil 20 mg daily as well for appetite and severe anxiety

## 2021-07-10 NOTE — ASSESSMENT & PLAN NOTE
Hypertension is worsening.  Dietary sodium restriction.  Stop smoking.  Medication changes per orders.  Blood pressure will be reassessed at the next regular appointment     Discussed cardioselective Betablocker, hopefully wll help with anxiety as well.

## 2021-07-13 RX ORDER — MIRTAZAPINE 30 MG/1
TABLET, FILM COATED ORAL
Qty: 30 TABLET | Refills: 2 | Status: SHIPPED | OUTPATIENT
Start: 2021-07-13 | End: 2021-10-05 | Stop reason: SDUPTHER

## 2021-07-14 ENCOUNTER — TELEPHONE (OUTPATIENT)
Dept: FAMILY MEDICINE CLINIC | Facility: CLINIC | Age: 71
End: 2021-07-14

## 2021-07-14 ENCOUNTER — DOCUMENTATION (OUTPATIENT)
Dept: PSYCHIATRY | Facility: CLINIC | Age: 71
End: 2021-07-14

## 2021-07-14 NOTE — PROGRESS NOTES
"Spoke to this patient today about a referral that we had for her. Patient was unwilling to set up My Chart and states that she is not very good with computers. However, patient also states that she needs to be seen very soon. She stated to me that she was \"at the end of her rope.\" Also stated that \"she is tired of her everyday and that waits until the end of the day so she can go to sleep, she is \"fed up with everything\" and has been having panic attacks. Patient just \"needs some help.\" Since patient was unable to set up My Chart for a virtual visit, I reached out to Dr. Kris Lorenzo's office and let them know what the patient had relayed to me. I also offered the patient to be seen as a walk in at [facility]. Patient stated at she would reach out to them and see if they accept her insurance and wait to hear back from her PCP.   "

## 2021-07-14 NOTE — TELEPHONE ENCOUNTER
JB FROM [facility] CALLED SAYING THAT THE PATIENT SEEMED THAT SHE WAS IN DISTRESS, AND IS UNABLE OR UNWILLING TO SET UP MYCHART IN ORDER TO DO AN INITIAL VIDEO VISIT. JB SAYS THE PATIENT SHOULD BE SEEN SOON. PLEASE ADVISE.    CALL BACK NUMBER- [phone]

## 2021-07-20 RX ORDER — TIZANIDINE 4 MG/1
TABLET ORAL
Qty: 180 TABLET | Refills: 0 | Status: SHIPPED | OUTPATIENT
Start: 2021-07-20 | End: 2021-10-05 | Stop reason: SDUPTHER

## 2021-08-11 ENCOUNTER — TELEPHONE (OUTPATIENT)
Dept: NEUROLOGY | Facility: CLINIC | Age: 71
End: 2021-08-11

## 2021-08-11 NOTE — TELEPHONE ENCOUNTER
Provider: NIRMALA  Caller: PT  Relationship to Patient: SELF  Pharmacy: N/A  Phone Number: [phone]  Reason for Call: FYI:    PT CALLED TO CANCEL HER APPT ON 8/19/21 W/LOULOU; SHE ONLY WANTS TO SEE DR. SLIVESTRE; WOULD NOT R/S BECAUSE SHE IS 'JUST NOT FEELING UP TO PAR' AND WILL CALL BACK LATER.    THIS APPT WAS A 4 MONTH MS CLINIC FOLLOW UP.    THANK YOU.

## 2021-08-16 ENCOUNTER — TELEPHONE (OUTPATIENT)
Dept: FAMILY MEDICINE CLINIC | Facility: CLINIC | Age: 71
End: 2021-08-16

## 2021-08-20 ENCOUNTER — TELEPHONE (OUTPATIENT)
Dept: NEUROLOGY | Facility: CLINIC | Age: 71
End: 2021-08-20

## 2021-08-20 DIAGNOSIS — G35 MULTIPLE SCLEROSIS (HCC): ICD-10-CM

## 2021-08-20 RX ORDER — OXCARBAZEPINE 300 MG/1
TABLET, FILM COATED ORAL
Qty: 180 TABLET | Refills: 1 | Status: SHIPPED | OUTPATIENT
Start: 2021-08-20 | End: 2021-10-05 | Stop reason: SDUPTHER

## 2021-08-20 NOTE — TELEPHONE ENCOUNTER
Oxcarbazepine refill:     Last filled: 11/02/2020 with 3 additional refills.     Last seen: 04/19/2021 by Damaso Balbuena future appt

## 2021-08-24 ENCOUNTER — TELEPHONE (OUTPATIENT)
Dept: NEUROLOGY | Facility: CLINIC | Age: 71
End: 2021-08-24

## 2021-08-24 NOTE — TELEPHONE ENCOUNTER
Provider: NIRMALA  Caller: PATIENT  Relationship to Patient: PATIENT    Reason for Call: PATIENT WANTS TO KNOW IF DR SILVESTRE THINKS ITS SAFE FOR HER TO GET THE VACCINE- WITH HER HAVING M.S.     PLEASE ADVISE. THANKS

## 2021-08-24 NOTE — TELEPHONE ENCOUNTER
Notified Angela she could get the vaccine. She is not doing infusion so no correlation involved and she is thrilled so will run up to the [employer] down the [address] and get Pfizer like her sister. Thanks!

## 2021-10-05 ENCOUNTER — OFFICE VISIT (OUTPATIENT)
Dept: NEUROLOGY | Facility: CLINIC | Age: 71
End: 2021-10-05

## 2021-10-05 VITALS
HEART RATE: 110 BPM | WEIGHT: 103 LBS | BODY MASS INDEX: 18.95 KG/M2 | OXYGEN SATURATION: 95 % | HEIGHT: 62 IN | DIASTOLIC BLOOD PRESSURE: 72 MMHG | SYSTOLIC BLOOD PRESSURE: 130 MMHG | TEMPERATURE: 97.3 F

## 2021-10-05 DIAGNOSIS — F32.9 REACTIVE DEPRESSION: Chronic | ICD-10-CM

## 2021-10-05 DIAGNOSIS — G35 MS (MULTIPLE SCLEROSIS) (HCC): Primary | Chronic | ICD-10-CM

## 2021-10-05 DIAGNOSIS — M79.2 NEUROPATHIC PAIN: Chronic | ICD-10-CM

## 2021-10-05 DIAGNOSIS — G35 MULTIPLE SCLEROSIS (HCC): ICD-10-CM

## 2021-10-05 PROCEDURE — 99214 OFFICE O/P EST MOD 30 MIN: CPT | Performed by: PSYCHIATRY & NEUROLOGY

## 2021-10-05 RX ORDER — TIZANIDINE 4 MG/1
4 TABLET ORAL 2 TIMES DAILY
Qty: 180 TABLET | Refills: 3 | Status: SHIPPED | OUTPATIENT
Start: 2021-10-05 | End: 2022-08-22

## 2021-10-05 RX ORDER — OXCARBAZEPINE 300 MG/1
300 TABLET, FILM COATED ORAL 2 TIMES DAILY
Qty: 180 TABLET | Refills: 3 | Status: SHIPPED | OUTPATIENT
Start: 2021-10-05 | End: 2022-05-06

## 2021-10-05 RX ORDER — MIRTAZAPINE 30 MG/1
30 TABLET, FILM COATED ORAL
Qty: 90 TABLET | Refills: 3 | Status: SHIPPED | OUTPATIENT
Start: 2021-10-05 | End: 2022-08-25

## 2021-10-05 NOTE — PROGRESS NOTES
"Chief Complaint  Multiple Sclerosis    Subjective          Angela Diaz Parent presents to [facility]     History of Present Illness    70 y.o. female returns in follow up.  Last visit on 4/19/21 continued Zanaflex and OXC, rx Remeron.       Ankle continues to heat after hardware removal.  Left ankle hurts to touch and has throbbing pain.       Wt stable at 103.       OXC controlling flexor spasms improved.       Hep C positive but inactive.      Problem history:     Dx in 1997 previously followed by Dr Jensen and Taty Montano.   Treated with Rebif two different times for 5 years and off for a year then back on for 7 - 9 years.  Stopped Rebif two months ago.       Last MRI Brain 18 months ago.  Anxiety for small spaces.       Fatigue is severe.       Pain in heads with sharp shooting episodes.  Sits on head and puts them in hot water.  Movement worsens pain.  Sharp shooting pains down shins once a week.       Gait unsteady.       Attention and concentration decreasing.       Reviewed medical records:     Followed by pain management treated with narcotics.  Complains of pain in hands.  Taking Xanax for anxiety referred to Psychiatry.       Hep C ab reactive; quantitation not detected.      Objective   Vital Signs:   /72   Pulse 110   Temp 97.3 °F (36.3 °C)   Ht 157.5 cm (62.01\")   Wt 46.7 kg (103 lb)   SpO2 95%   BMI 18.83 kg/m²     Physical Exam  Eyes:      Extraocular Movements: EOM normal.      Pupils: Pupils are equal, round, and reactive to light.   Neurological:      Mental Status: She is oriented to person, place, and time.      Deep Tendon Reflexes: Strength normal.   Psychiatric:         Speech: Speech normal.          Neurologic Exam     Mental Status   Oriented to person, place, and time.   Speech: speech is normal   Level of consciousness: alert  Knowledge: good and consistent with education.   Normal comprehension.     Cranial Nerves   Cranial nerves II through XII " intact.     CN II   Visual fields full to confrontation.   Visual acuity: normal  Right visual field deficit: none  Left visual field deficit: none     CN III, IV, VI   Pupils are equal, round, and reactive to light.  Extraocular motions are normal.   Nystagmus: none   Diplopia: none  Ophthalmoparesis: none  Upgaze: normal  Downgaze: normal  Conjugate gaze: present    CN V   Facial sensation intact.   Right corneal reflex: normal  Left corneal reflex: normal    CN VII   Right facial weakness: none  Left facial weakness: none    CN VIII   Hearing: intact    CN IX, X   Palate: symmetric  Right gag reflex: normal  Left gag reflex: normal    CN XI   Right sternocleidomastoid strength: normal  Left sternocleidomastoid strength: normal    CN XII   Tongue: not atrophic  Fasciculations: absent  Tongue deviation: none    Motor Exam   Muscle bulk: normal  Overall muscle tone: normal    Strength   Strength 5/5 throughout.     Sensory Exam   Light touch normal.     Gait, Coordination, and Reflexes     Gait  Gait: non-neurologic    Tremor   Resting tremor: absent  Intention tremor: absent  Action tremor: absent    Reflexes   Reflexes 2+ except as noted.      Result Review :   The following data was reviewed by: Mehdi Petit MD on 10/05/2021:  Common labs    Common Labsle 11/2/20 11/2/20 4/26/21 4/26/21 4/26/21    1112 1112 1339 1339 1339   Glucose  105 (A)   166 (A)   BUN  12   12   Creatinine  0.75   0.77   eGFR Non African Am  76   74   Sodium  132 (A)   131 (A)   Potassium  5.1   4.5   Chloride  95 (A)   92 (A)   Calcium  10.5   9.7   Albumin  5.00   4.60   Total Bilirubin  0.3   <0.2   Alkaline Phosphatase  98   98   AST (SGOT)  21   17   ALT (SGPT)  17   17   WBC 9.46  8.60     Hemoglobin 14.4  13.4     Hematocrit 41.5  39.7     Platelets 412  427     Hemoglobin A1C    5.65 (A)    (A) Abnormal value                      Assessment and Plan    Diagnoses and all orders for this visit:    1. MS (multiple sclerosis) (HCC)  (Primary)  Assessment & Plan:  Sx stable off DMT        2. Neuropathic pain  Assessment & Plan:  Controlled on OXC       3. Reactive depression  Assessment & Plan:  Sx moderate on Seroquel, Remeron        Follow Up   No follow-ups on file.  Patient was given instructions and counseling regarding her condition or for health maintenance advice. Please see specific information pulled into the AVS if appropriate.

## 2021-10-11 ENCOUNTER — TRANSCRIBE ORDERS (OUTPATIENT)
Dept: GENERAL RADIOLOGY | Facility: HOSPITAL | Age: 71
End: 2021-10-11

## 2021-10-11 ENCOUNTER — HOSPITAL ENCOUNTER (OUTPATIENT)
Dept: GENERAL RADIOLOGY | Facility: HOSPITAL | Age: 71
Discharge: HOME | End: 2021-10-11
Admitting: PHYSICAL MEDICINE & REHABILITATION

## 2021-10-11 DIAGNOSIS — M25.572 PAIN AND SWELLING OF ANKLE, LEFT: ICD-10-CM

## 2021-10-11 DIAGNOSIS — M25.472 PAIN AND SWELLING OF ANKLE, LEFT: Primary | ICD-10-CM

## 2021-10-11 DIAGNOSIS — M25.472 PAIN AND SWELLING OF ANKLE, LEFT: ICD-10-CM

## 2021-10-11 DIAGNOSIS — M25.572 PAIN AND SWELLING OF ANKLE, LEFT: Primary | ICD-10-CM

## 2021-10-11 PROCEDURE — 73600 X-RAY EXAM OF ANKLE: CPT

## 2021-10-14 ENCOUNTER — OFFICE VISIT (OUTPATIENT)
Dept: FAMILY MEDICINE CLINIC | Facility: CLINIC | Age: 71
End: 2021-10-14

## 2021-10-14 VITALS
OXYGEN SATURATION: 96 % | WEIGHT: 103 LBS | RESPIRATION RATE: 16 BRPM | DIASTOLIC BLOOD PRESSURE: 90 MMHG | HEART RATE: 101 BPM | HEIGHT: 62 IN | BODY MASS INDEX: 18.95 KG/M2 | SYSTOLIC BLOOD PRESSURE: 138 MMHG | TEMPERATURE: 97.3 F

## 2021-10-14 DIAGNOSIS — Z51.81 MEDICATION MONITORING ENCOUNTER: ICD-10-CM

## 2021-10-14 DIAGNOSIS — R63.4 WEIGHT LOSS: Primary | ICD-10-CM

## 2021-10-14 DIAGNOSIS — F41.9 ANXIETY: ICD-10-CM

## 2021-10-14 DIAGNOSIS — R10.32 LEFT LOWER QUADRANT ABDOMINAL PAIN: ICD-10-CM

## 2021-10-14 PROCEDURE — G0483 DRUG TEST DEF 22+ CLASSES: HCPCS | Performed by: INTERNAL MEDICINE

## 2021-10-14 PROCEDURE — 99214 OFFICE O/P EST MOD 30 MIN: CPT | Performed by: INTERNAL MEDICINE

## 2021-10-14 PROCEDURE — 80307 DRUG TEST PRSMV CHEM ANLYZR: CPT | Performed by: INTERNAL MEDICINE

## 2021-10-14 RX ORDER — OMEPRAZOLE 40 MG/1
40 CAPSULE, DELAYED RELEASE ORAL DAILY
Qty: 30 CAPSULE | Refills: 6 | Status: SHIPPED | OUTPATIENT
Start: 2021-10-14 | End: 2022-04-08

## 2021-10-14 RX ORDER — CLORAZEPATE DIPOTASSIUM 3.75 MG/1
3.75 TABLET ORAL 2 TIMES DAILY
Qty: 60 TABLET | Refills: 2 | Status: SHIPPED | OUTPATIENT
Start: 2021-10-14 | End: 2022-01-25 | Stop reason: SDUPTHER

## 2021-10-14 NOTE — PROGRESS NOTES
Angela Warren  1950  [id number]  Patient Care Team:  Kris Malin MD as PCP - General (Internal Medicine)  Justin Cardoso MD as Consulting Physician (Ophthalmology)  Justin Parrish MD as Consulting Physician (Dermatology)  Derrick Harding II, MD (Pain Medicine)  Stan Lozoya MD as Consulting Physician (Orthopedic Surgery)  Mehdi Petit MD as Consulting Physician (Neurology)    Angela Warren is a 70 y.o. female here today for follow up.      This patient is accompanied by their self who contributes to the history of their care.    Chief Complaint:    Chief Complaint   Patient presents with   • Hypertension   • agoraphobia         History of Present Illness:  I have reviewed and/or updated the patient's past medical, past surgical, family, social history, problem list and allergies as appropriate.     On going weight loss. She feels she eats and has an appetite. Reports severe abd pain daily which occasionally gets better with pepto. There is no nausea vomiting or diarrhea. Did not see behavioral health, nor is she taking the anxiety meds 2/2 to headache. Paxil. Her anxiety is worsening.Is wanting help with anxiety.  She has been on numerous SSRIs and intolerant of them.  She has been on benzos in the past and was prescribed her neurologist.  She has difficulty sleeping.  She has chronic chest pain that she feels is associated with anxiety.  It does not radiate.  It is associated with her morning nauseousness.  No palpitations or syncope.  Can last up to all day.    Review of Systems   Constitutional: Positive for fatigue and unexpected weight loss.   Eyes: Negative.    Respiratory: Positive for cough.    Cardiovascular: Positive for chest pain.   Gastrointestinal: Positive for abdominal pain and indigestion.   Endocrine: Negative.  Negative for cold intolerance, heat intolerance, polydipsia and polyuria.   Genitourinary: Negative for flank pain and frequency.   Musculoskeletal:  "Negative.    Skin: Negative.    Neurological:        Add headaches with Paxil   Psychiatric/Behavioral: Positive for sleep disturbance.       Vitals:    10/14/21 1613   BP: 138/90   Pulse: 101   Resp: 16   Temp: 97.3 °F (36.3 °C)   SpO2: 96%   Weight: 46.7 kg (103 lb)   Height: 157.5 cm (62.01\")     Body mass index is 18.83 kg/m².    Physical Exam    Procedures    Results Review:    None     Assessment/Plan:  This is a 70-year-old female presents with worsening abdominal pain, weight loss and difficulty gaining weight.  This is on the face of a normal appetite.  She has severe debilitating anxiety and agoraphobia.  Typically would not have a benzodiazepine as she is literally incapacitated.  I recommended clorazepate 3.75 mg p.o. twice daily.  If arnaldo suspicion is correct I suspect this will help with her abdominal pain as well.  However given her worsening pain in the face of inability to gain weight I referred her to gastroenterology.  Empiric trial of Prilosec.  She agrees to compliance drug analysis as well as a CSA.  Of attempt to refer her to psychiatry and psychology again as well.  Problem List Items Addressed This Visit        Endocrine and Metabolic    Weight loss - Primary    Relevant Orders    Ambulatory Referral to Gastroenterology       Gastrointestinal Abdominal     Left lower quadrant abdominal pain    Relevant Orders    Ambulatory Referral to Gastroenterology       Health Encounters    Medication monitoring encounter    Relevant Medications    clorazepate (TRANXENE) 3.75 MG tablet    Other Relevant Orders    Compliance Drug Analysis, Ur - Urine, Clean Catch       Mental Health    Anxiety    Relevant Orders    Ambulatory Referral to Psychiatry    Ambulatory Referral to Psychology          Plan of care reviewed with patient at the conclusion of today's visit. Education was provided regarding diagnosis and management.  Patient verbalizes understanding of and agreement with management plan.    Return in " about 3 months (around 1/14/2022) for anxiety.    Kris Malin MD    Please note that portions of this note may have been completed with a voice recognition program. Efforts were made to edit the dictations, but occasionally words are mistranscribed.

## 2021-10-19 ENCOUNTER — TELEPHONE (OUTPATIENT)
Dept: FAMILY MEDICINE CLINIC | Facility: CLINIC | Age: 71
End: 2021-10-19

## 2021-10-19 NOTE — TELEPHONE ENCOUNTER
Caller: Parent, Angela Diaz    Relationship: Self    Best call back number: [phone]    When is it needed: PRIOR AUTHORIZATION     Additional information or concerns: PATIENT STATED THAT PRIOR AUTHORIZATION IS NEEDED FOR clorazepate (TRANXENE) 3.75 MG tablet MEDICATION     PLEASE ADVISE

## 2021-10-20 NOTE — TELEPHONE ENCOUNTER
Attempted to start a PA - message sent below    Additional Information Required  A pending PA request for the patient/medication combination exists. If you need to make any changes to pending request please contact [employer] by utilizing the Contact Us link at [email or web address].    Will wait for the official approval/denial letter

## 2021-10-22 LAB — DRUGS UR: NORMAL

## 2021-10-25 ENCOUNTER — PRIOR AUTHORIZATION (OUTPATIENT)
Dept: FAMILY MEDICINE CLINIC | Facility: CLINIC | Age: 71
End: 2021-10-25

## 2021-10-25 NOTE — TELEPHONE ENCOUNTER
Patient called regarding PA, unable to recover status. Advised patient that she should check with insurance regarding which med is covered under benefit plan     I have initiated new PA today

## 2021-10-25 NOTE — TELEPHONE ENCOUNTER
Completed Prior Authorization for Clorazepate 3.75 tablet    Key: [id number]  Awaiting determination from insurance

## 2021-10-26 ENCOUNTER — TELEPHONE (OUTPATIENT)
Dept: FAMILY MEDICINE CLINIC | Facility: CLINIC | Age: 71
End: 2021-10-26

## 2021-10-26 NOTE — TELEPHONE ENCOUNTER
MARIBEL WITH [facility] IS CALLING THE REGARDING THE PATIENT CLOAZEPATE. [facility] WOULD LIKE TO GET A STATEMENT FROM THE DOCTOR STATING THE THE BENEFITS OF THE MEDICATION OUT WEIGH THE RISK OF THE MEDICATION. THE CALLER WOULD ALSO LIKE A LIST OF ANY MEDICATIONS THAT THE PATIENT HAS TRIED AND FAILED  THE CALLER WOULD ALSO LIKE TO LET THE OFFICE KNOW THAT THEY CAN ALSO REQUEST TIER EXCEPTION TO HELP LOWER THE COST OF THE MEDICATIONS. THE CALLER STATES THAT SHE WILL FAX OVER THE INFORMATION TO THE OFFICE FOR THE DOCTOR TO FILL OUT    CALL  [phone]

## 2021-10-26 NOTE — TELEPHONE ENCOUNTER
Outcome received that prior auth for Clorazepate Dipotassium 3.75mg tablets , was denied by pt's plan. Denial Letter faxed to office.

## 2021-11-08 RX ORDER — ALBUTEROL SULFATE 90 UG/1
AEROSOL, METERED RESPIRATORY (INHALATION)
Qty: 18 G | Refills: 6 | Status: SHIPPED | OUTPATIENT
Start: 2021-11-08 | End: 2022-06-21

## 2021-11-08 NOTE — TELEPHONE ENCOUNTER
Rx Refill Note  Requested Prescriptions     Pending Prescriptions Disp Refills   • albuterol sulfate  (90 Base) MCG/ACT inhaler [Pharmacy Med Name: ALBUTEROL HFA (PROAIR) INHALER]  6     Sig: TAKE 2 PUFFS BY MOUTH EVERY 4 HOURS AS NEEDED FOR WHEEZE      Last office visit with prescribing clinician: 10/14/2021      Next office visit with prescribing clinician: 1/14/2022            Rasta Velasco MA  11/08/21, 08:47 EST

## 2021-11-22 ENCOUNTER — OFFICE VISIT (OUTPATIENT)
Dept: GASTROENTEROLOGY | Facility: CLINIC | Age: 71
End: 2021-11-22

## 2021-11-22 VITALS
DIASTOLIC BLOOD PRESSURE: 86 MMHG | BODY MASS INDEX: 19.69 KG/M2 | HEART RATE: 57 BPM | OXYGEN SATURATION: 98 % | SYSTOLIC BLOOD PRESSURE: 132 MMHG | TEMPERATURE: 97.5 F | WEIGHT: 107 LBS | HEIGHT: 62 IN

## 2021-11-22 DIAGNOSIS — Z72.0 TOBACCO USE: ICD-10-CM

## 2021-11-22 DIAGNOSIS — R10.30 LOWER ABDOMINAL PAIN: Primary | ICD-10-CM

## 2021-11-22 DIAGNOSIS — R53.81 MALAISE AND FATIGUE: ICD-10-CM

## 2021-11-22 DIAGNOSIS — Z86.010 PERSONAL HISTORY OF COLONIC POLYPS: ICD-10-CM

## 2021-11-22 DIAGNOSIS — R53.83 MALAISE AND FATIGUE: ICD-10-CM

## 2021-11-22 PROCEDURE — 99203 OFFICE O/P NEW LOW 30 MIN: CPT | Performed by: INTERNAL MEDICINE

## 2021-11-22 NOTE — PROGRESS NOTES
Patient Name: Angela Warren  YOB: 1950   Medical Record number: [id number]     PCP: Kris Malin MD    Chief Complaint   Patient presents with   • Weight Loss   • Abdominal Pain       History of Present Illness:   HPI  Appreciate the consult for abdominal pain.  Ms. Warren is a 71-year-old with a history of anxiety, depression and hypertension.  She also has a history of multiple sclerosis.  The patient reports she had some orthopedic surgery in 2019 and after that intervention she began losing weight.  Her maximal weight was 132 pounds.  Her weight has been relatively stable over the last few months.  The patient states that she will have the pain in the morning usually after waking up.  The pain will last for 1 hour and then dissipate.  She has a good appetite.  The patient eats a considerable amount of food daily without any abdominal pain during the meal.  She denies any nausea.  Her bowel habits overall are regular without any blood in the stool.  The patient does take MiraLAX usually every other day with orange juice to maintain good bowel function.  There is no history of difficult or painful swallowing.  She denies any breakthrough heartburn.  Ms. Warren reports having a colonoscopy and upper endoscopy about 5 or 6 years ago at [facility].  This was performed by Dr. Cardoso.  The patient does believe 1 polyp was found but those records were not available at the time of this consult.  Ms. Warren does admit to smoking again although only 1/2 pack daily.  She did quit for period of time.  There is no history of night sweats, fever or chills.  She does admit to feeling depressed and not having a lot of energy overall.  Past Medical History:   Diagnosis Date   • Acid reflux    • Anxiety    • Arthritis    • Cataract    • CKD (chronic kidney disease) stage 2, GFR 60-89 ml/min 2018   • Colon polyp    • Depression    • GERD (gastroesophageal reflux disease)    • Headache    • Heart murmur     • History of blood transfusion 1971    after surgery    • Hypertension 2018   • Infectious viral hepatitis    • Mild aortic regurgitation 01/03/2019   • MS (multiple sclerosis) (HCC)    • Pneumonia    • Prediabetes 12/12/2019       Past Surgical History:   Procedure Laterality Date   • ANKLE OPEN REDUCTION INTERNAL FIXATION Left 12/28/2019    Procedure: ANKLE OPEN REDUCTION INTERNAL FIXATION;  Surgeon: Stan Lozoya MD;  Location: [facility];  Service: Orthopedics   • CATARACT EXTRACTION  2009   • CERVICAL BIOPSY  1971    benign   • COLONOSCOPY     • HEMORRHOIDECTOMY  1971   • TUBAL ABDOMINAL LIGATION  1985         Current Outpatient Medications:   •  albuterol sulfate  (90 Base) MCG/ACT inhaler, TAKE 2 PUFFS BY MOUTH EVERY 4 HOURS AS NEEDED FOR WHEEZE, Disp: 18 g, Rfl: 6  •  bisoprolol (ZEBeta) 10 MG tablet, Take 1 tablet by mouth Daily., Disp: 30 tablet, Rfl: 9  •  clorazepate (TRANXENE) 3.75 MG tablet, Take 1 tablet by mouth 2 (Two) Times a Day., Disp: 60 tablet, Rfl: 2  •  HYDROcodone-acetaminophen (NORCO)  MG per tablet, Every 6 (Six) Hours As Needed., Disp: , Rfl:   •  mirtazapine (REMERON) 30 MG tablet, Take 1 tablet by mouth every night at bedtime., Disp: 90 tablet, Rfl: 3  •  omeprazole (priLOSEC) 40 MG capsule, Take 1 capsule by mouth Daily., Disp: 30 capsule, Rfl: 6  •  OXcarbazepine (TRILEPTAL) 300 MG tablet, Take 1 tablet by mouth 2 (Two) Times a Day., Disp: 180 tablet, Rfl: 3  •  polyethylene glycol (MIRALAX) powder, Take 17 g by mouth Daily., Disp: 1 each, Rfl: 11  •  QUEtiapine (SEROquel) 100 MG tablet, TAKE 1 TABLET BY MOUTH EVERY DAY AT NIGHT, Disp: 90 tablet, Rfl: 6  •  tiZANidine (ZANAFLEX) 4 MG tablet, Take 1 tablet by mouth 2 (Two) Times a Day., Disp: 180 tablet, Rfl: 3    Allergies   Allergen Reactions   • Penicillins Anaphylaxis   • Lisinopril Rash and Cough       Family History   Problem Relation Age of Onset   • Thyroid disease Mother    • Hyperlipidemia Sister    •  Thyroid disease Sister    • Other Sister         parathyroid disease   • Hypertension Brother    • Migraines Brother    • Other Brother         kidney stones   • Other Niece         parathyroid disease   • Breast cancer Neg Hx    • Ovarian cancer Neg Hx    • Colon cancer Neg Hx    • Colon polyps Neg Hx    • Esophageal cancer Neg Hx        Social History     Socioeconomic History   • Marital status:    Tobacco Use   • Smoking status: Light Tobacco Smoker     Packs/day: 0.50     Types: Cigarettes   • Smokeless tobacco: Never Used   Vaping Use   • Vaping Use: Never used   Substance and Sexual Activity   • Alcohol use: No   • Drug use: No   • Sexual activity: Defer       Review of Systems   Constitutional: Positive for fatigue and unexpected weight change. Negative for appetite change and fever.   HENT: Negative for dental problem, mouth sores, postnasal drip, sneezing, trouble swallowing and voice change.    Eyes: Negative for pain, redness and itching.   Respiratory: Negative for cough, shortness of breath and wheezing.    Cardiovascular: Negative for chest pain, palpitations and leg swelling.   Gastrointestinal: Positive for abdominal pain. Negative for abdominal distention, anal bleeding, blood in stool, constipation, diarrhea, nausea, rectal pain and vomiting.   Endocrine: Negative for cold intolerance, heat intolerance, polydipsia and polyuria.   Genitourinary: Negative for dysuria, enuresis, flank pain, hematuria and urgency.   Musculoskeletal: Negative for arthralgias, back pain, joint swelling and myalgias.   Skin: Negative for color change, pallor and rash.   Allergic/Immunologic: Negative for environmental allergies, food allergies and immunocompromised state.   Neurological: Positive for weakness. Negative for dizziness, tremors, seizures, facial asymmetry, numbness and headaches.   Psychiatric/Behavioral: Negative for behavioral problems, dysphoric mood, hallucinations and self-injury.   All other  systems reviewed and are negative.      Vitals:    11/22/21 1439   BP: 132/86   Pulse: 57   Temp: 97.5 °F (36.4 °C)   SpO2: 98%       Physical Exam  Vitals reviewed.   Constitutional:       General: She is not in acute distress.     Appearance: Normal appearance.   HENT:      Head: Normocephalic and atraumatic.      Nose: Nose normal.      Mouth/Throat:      Mouth: Mucous membranes are moist.      Pharynx: Oropharynx is clear.   Eyes:      General: No scleral icterus.     Extraocular Movements: Extraocular movements intact.   Cardiovascular:      Rate and Rhythm: Normal rate and regular rhythm.      Heart sounds: No murmur heard.      Pulmonary:      Breath sounds: Normal breath sounds. No wheezing or rales.   Abdominal:      General: Abdomen is flat. Bowel sounds are normal.      Palpations: Abdomen is soft.      Tenderness: There is no abdominal tenderness. There is no guarding.   Musculoskeletal:         General: No swelling. Normal range of motion.      Cervical back: Normal range of motion. No rigidity.   Skin:     General: Skin is dry.      Coloration: Skin is not jaundiced.   Neurological:      Mental Status: She is alert and oriented to person, place, and time.   Psychiatric:         Mood and Affect: Mood normal.         Thought Content: Thought content normal.         Judgment: Judgment normal.         Diagnoses and all orders for this visit:    1. Lower abdominal pain (Primary)  -     Colonoscopy; Future    2. Malaise and fatigue    3. Tobacco use    4. Personal history of colonic polyps  -     Colonoscopy; Future    Patient primarily has some lower abdominal pain without any direct relationship to meals or bowel function.  She reports a previous colonoscopy with 1 polyp being found.  Those records were not available to determine if this was an adenomatous type polyp.  She had a noncontrasted CT scan of the chest abdomen pelvis in April that was unremarkable.  The clinical history is concerning for  depression be related to some of the current complaints.  The history is not consistent with intestinal angina given that she has a good appetite and does not have any meena pain with meals or postprandially.      Plan: Will schedule for colonoscopy.           Will ask for a copy of previous colonoscopy and path report.           Encouraged tobacco cessation.

## 2021-11-23 ENCOUNTER — PATIENT ROUNDING (BHMG ONLY) (OUTPATIENT)
Dept: GASTROENTEROLOGY | Facility: CLINIC | Age: 71
End: 2021-11-23

## 2021-11-23 NOTE — PROGRESS NOTES
November 23, 2021    Hello, may I speak with Angela Warren?    My name is Selma Zavala's medical assistant.    I am  with [facility]  [address]   [place] [address] [phone].    Before we get started may I verify your date of birth? 1950    I am calling to officially welcome you to our practice and ask about your recent visit. Is this a good time to talk? Yes     Tell me about your visit with us. What things went well?  I really liked Dr Zavala. I'm very satisfied. Dr Zavala was thorough and caring. I'm very happy with my first visit.        We're always looking for ways to make our patients' experiences even better. Do you have recommendations on ways we may improve?  No. Very pleased. I didn't have to wait a long time.     Overall were you satisfied with your first visit to our practice? Yes       I appreciate you taking the time to speak with me today. Is there anything else I can do for you? No      Thank you, and have a great day. Call us if you need anything else from us.

## 2021-11-24 DIAGNOSIS — Z12.11 SCREENING FOR COLON CANCER: Primary | ICD-10-CM

## 2021-11-24 RX ORDER — SODIUM, POTASSIUM,MAG SULFATES 17.5-3.13G
1 SOLUTION, RECONSTITUTED, ORAL ORAL TAKE AS DIRECTED
Qty: 354 ML | Refills: 0 | Status: SHIPPED | OUTPATIENT
Start: 2021-11-24 | End: 2023-01-05

## 2021-12-30 ENCOUNTER — TELEPHONE (OUTPATIENT)
Dept: FAMILY MEDICINE CLINIC | Facility: CLINIC | Age: 71
End: 2021-12-30

## 2021-12-30 RX ORDER — METHYLPREDNISOLONE 4 MG/1
TABLET ORAL
Qty: 1 EACH | Refills: 0 | Status: SHIPPED | OUTPATIENT
Start: 2021-12-30 | End: 2023-01-05

## 2021-12-30 RX ORDER — AZITHROMYCIN 250 MG/1
TABLET, FILM COATED ORAL
Qty: 6 TABLET | Refills: 0 | Status: SHIPPED | OUTPATIENT
Start: 2021-12-30 | End: 2023-01-05

## 2022-01-25 DIAGNOSIS — Z51.81 MEDICATION MONITORING ENCOUNTER: ICD-10-CM

## 2022-01-25 RX ORDER — CLORAZEPATE DIPOTASSIUM 3.75 MG/1
TABLET ORAL
Qty: 60 TABLET | Refills: 2 | Status: SHIPPED | OUTPATIENT
Start: 2022-01-25 | End: 2023-01-05

## 2022-01-25 NOTE — TELEPHONE ENCOUNTER
Rx Refill Note  Requested Prescriptions     Pending Prescriptions Disp Refills   • clorazepate (TRANXENE) 3.75 MG tablet [Pharmacy Med Name: CLORAZEPATE 3.75 MG TABLET] 60 tablet 2     Sig: TAKE 1 TABLET BY MOUTH TWICE A DAY      Last office visit with prescribing clinician: 10/14/2021      Next office visit with prescribing clinician: 01/31/2022            Rasta Velasco MA  01/25/22, 15:08 EST

## 2022-01-28 PROBLEM — Z74.09 IMPAIRED MOBILITY: Status: ACTIVE | Noted: 2022-01-28

## 2022-03-02 ENCOUNTER — APPOINTMENT (OUTPATIENT)
Dept: CT IMAGING | Facility: HOSPITAL | Age: 72
End: 2022-03-02

## 2022-03-02 ENCOUNTER — OFFICE VISIT (OUTPATIENT)
Dept: FAMILY MEDICINE CLINIC | Facility: CLINIC | Age: 72
End: 2022-03-02

## 2022-03-02 ENCOUNTER — HOSPITAL ENCOUNTER (EMERGENCY)
Facility: HOSPITAL | Age: 72
Discharge: HOME | End: 2022-03-02
Attending: EMERGENCY MEDICINE | Admitting: EMERGENCY MEDICINE

## 2022-03-02 ENCOUNTER — APPOINTMENT (OUTPATIENT)
Dept: GENERAL RADIOLOGY | Facility: HOSPITAL | Age: 72
End: 2022-03-02

## 2022-03-02 VITALS
BODY MASS INDEX: 18.37 KG/M2 | HEIGHT: 62 IN | OXYGEN SATURATION: 95 % | DIASTOLIC BLOOD PRESSURE: 92 MMHG | SYSTOLIC BLOOD PRESSURE: 160 MMHG | WEIGHT: 99.8 LBS | HEART RATE: 103 BPM

## 2022-03-02 VITALS
OXYGEN SATURATION: 93 % | SYSTOLIC BLOOD PRESSURE: 188 MMHG | RESPIRATION RATE: 18 BRPM | TEMPERATURE: 98.3 F | BODY MASS INDEX: 18.22 KG/M2 | WEIGHT: 99 LBS | HEIGHT: 62 IN | DIASTOLIC BLOOD PRESSURE: 126 MMHG | HEART RATE: 109 BPM

## 2022-03-02 DIAGNOSIS — R10.10 PAIN OF UPPER ABDOMEN: ICD-10-CM

## 2022-03-02 DIAGNOSIS — K59.00 OBSTIPATION: ICD-10-CM

## 2022-03-02 DIAGNOSIS — R63.4 WEIGHT LOSS: Primary | ICD-10-CM

## 2022-03-02 DIAGNOSIS — K59.09 OTHER CONSTIPATION: Primary | ICD-10-CM

## 2022-03-02 DIAGNOSIS — R11.14 BILIOUS VOMITING WITH NAUSEA: ICD-10-CM

## 2022-03-02 DIAGNOSIS — E87.1 HYPONATREMIA: ICD-10-CM

## 2022-03-02 LAB
ALBUMIN SERPL-MCNC: 4.7 G/DL (ref 3.5–5.2)
ALBUMIN/GLOB SERPL: 1.4 G/DL
ALP SERPL-CCNC: 109 U/L (ref 39–117)
ALT SERPL W P-5'-P-CCNC: 12 U/L (ref 1–33)
ANION GAP SERPL CALCULATED.3IONS-SCNC: 12 MMOL/L (ref 5–15)
AST SERPL-CCNC: 19 U/L (ref 1–32)
BASOPHILS # BLD AUTO: 0.03 10*3/MM3 (ref 0–0.2)
BASOPHILS NFR BLD AUTO: 0.3 % (ref 0–1.5)
BILIRUB SERPL-MCNC: 0.2 MG/DL (ref 0–1.2)
BILIRUB UR QL STRIP: NEGATIVE
BUN SERPL-MCNC: 8 MG/DL (ref 8–23)
BUN/CREAT SERPL: 11.8 (ref 7–25)
CALCIUM SPEC-SCNC: 10.1 MG/DL (ref 8.6–10.5)
CHLORIDE SERPL-SCNC: 90 MMOL/L (ref 98–107)
CLARITY UR: CLEAR
CO2 SERPL-SCNC: 26 MMOL/L (ref 22–29)
COLOR UR: YELLOW
CREAT SERPL-MCNC: 0.68 MG/DL (ref 0.57–1)
DEPRECATED RDW RBC AUTO: 49.8 FL (ref 37–54)
EGFRCR SERPLBLD CKD-EPI 2021: 92.1 ML/MIN/1.73
EOSINOPHIL # BLD AUTO: 0.07 10*3/MM3 (ref 0–0.4)
EOSINOPHIL NFR BLD AUTO: 0.8 % (ref 0.3–6.2)
ERYTHROCYTE [DISTWIDTH] IN BLOOD BY AUTOMATED COUNT: 13.9 % (ref 12.3–15.4)
GLOBULIN UR ELPH-MCNC: 3.3 GM/DL
GLUCOSE SERPL-MCNC: 112 MG/DL (ref 65–99)
GLUCOSE UR STRIP-MCNC: NEGATIVE MG/DL
HCT VFR BLD AUTO: 40.6 % (ref 34–46.6)
HGB BLD-MCNC: 13.6 G/DL (ref 12–15.9)
HGB UR QL STRIP.AUTO: NEGATIVE
HOLD SPECIMEN: NORMAL
IMM GRANULOCYTES # BLD AUTO: 0.04 10*3/MM3 (ref 0–0.05)
IMM GRANULOCYTES NFR BLD AUTO: 0.4 % (ref 0–0.5)
KETONES UR QL STRIP: NEGATIVE
LEUKOCYTE ESTERASE UR QL STRIP.AUTO: NEGATIVE
LIPASE SERPL-CCNC: 20 U/L (ref 13–60)
LYMPHOCYTES # BLD AUTO: 1.99 10*3/MM3 (ref 0.7–3.1)
LYMPHOCYTES NFR BLD AUTO: 22.1 % (ref 19.6–45.3)
MCH RBC QN AUTO: 32.4 PG (ref 26.6–33)
MCHC RBC AUTO-ENTMCNC: 33.5 G/DL (ref 31.5–35.7)
MCV RBC AUTO: 96.7 FL (ref 79–97)
MONOCYTES # BLD AUTO: 0.69 10*3/MM3 (ref 0.1–0.9)
MONOCYTES NFR BLD AUTO: 7.6 % (ref 5–12)
NEUTROPHILS NFR BLD AUTO: 6.2 10*3/MM3 (ref 1.7–7)
NEUTROPHILS NFR BLD AUTO: 68.8 % (ref 42.7–76)
NITRITE UR QL STRIP: NEGATIVE
NRBC BLD AUTO-RTO: 0 /100 WBC (ref 0–0.2)
PH UR STRIP.AUTO: 7.5 [PH] (ref 5–8)
PLATELET # BLD AUTO: 436 10*3/MM3 (ref 140–450)
PMV BLD AUTO: 8.8 FL (ref 6–12)
POTASSIUM SERPL-SCNC: 4.2 MMOL/L (ref 3.5–5.2)
PROT SERPL-MCNC: 8 G/DL (ref 6–8.5)
PROT UR QL STRIP: NEGATIVE
RBC # BLD AUTO: 4.2 10*6/MM3 (ref 3.77–5.28)
SODIUM SERPL-SCNC: 128 MMOL/L (ref 136–145)
SP GR UR STRIP: 1.02 (ref 1–1.03)
TROPONIN T SERPL-MCNC: 0.02 NG/ML (ref 0–0.03)
UROBILINOGEN UR QL STRIP: NORMAL
WBC NRBC COR # BLD: 9.02 10*3/MM3 (ref 3.4–10.8)
WHOLE BLOOD HOLD SPECIMEN: NORMAL
WHOLE BLOOD HOLD SPECIMEN: NORMAL

## 2022-03-02 PROCEDURE — 99214 OFFICE O/P EST MOD 30 MIN: CPT | Performed by: INTERNAL MEDICINE

## 2022-03-02 PROCEDURE — 96374 THER/PROPH/DIAG INJ IV PUSH: CPT

## 2022-03-02 PROCEDURE — 25010000002 KETOROLAC TROMETHAMINE PER 15 MG: Performed by: EMERGENCY MEDICINE

## 2022-03-02 PROCEDURE — 81003 URINALYSIS AUTO W/O SCOPE: CPT | Performed by: EMERGENCY MEDICINE

## 2022-03-02 PROCEDURE — 25010000002 ONDANSETRON PER 1 MG: Performed by: EMERGENCY MEDICINE

## 2022-03-02 PROCEDURE — 80053 COMPREHEN METABOLIC PANEL: CPT

## 2022-03-02 PROCEDURE — 93005 ELECTROCARDIOGRAM TRACING: CPT | Performed by: EMERGENCY MEDICINE

## 2022-03-02 PROCEDURE — 36415 COLL VENOUS BLD VENIPUNCTURE: CPT

## 2022-03-02 PROCEDURE — 83690 ASSAY OF LIPASE: CPT

## 2022-03-02 PROCEDURE — 93005 ELECTROCARDIOGRAM TRACING: CPT

## 2022-03-02 PROCEDURE — 85025 COMPLETE CBC W/AUTO DIFF WBC: CPT

## 2022-03-02 PROCEDURE — 99283 EMERGENCY DEPT VISIT LOW MDM: CPT

## 2022-03-02 PROCEDURE — 96375 TX/PRO/DX INJ NEW DRUG ADDON: CPT

## 2022-03-02 PROCEDURE — 84484 ASSAY OF TROPONIN QUANT: CPT

## 2022-03-02 PROCEDURE — 71045 X-RAY EXAM CHEST 1 VIEW: CPT

## 2022-03-02 PROCEDURE — 74177 CT ABD & PELVIS W/CONTRAST: CPT

## 2022-03-02 PROCEDURE — 25010000002 IOPAMIDOL 61 % SOLUTION: Performed by: EMERGENCY MEDICINE

## 2022-03-02 RX ORDER — ONDANSETRON 2 MG/ML
4 INJECTION INTRAMUSCULAR; INTRAVENOUS ONCE
Status: COMPLETED | OUTPATIENT
Start: 2022-03-02 | End: 2022-03-02

## 2022-03-02 RX ORDER — DICYCLOMINE HCL 20 MG
20 TABLET ORAL EVERY 8 HOURS PRN
Qty: 20 TABLET | Refills: 0 | Status: SHIPPED | OUTPATIENT
Start: 2022-03-02 | End: 2023-01-05

## 2022-03-02 RX ORDER — SODIUM CHLORIDE 0.9 % (FLUSH) 0.9 %
10 SYRINGE (ML) INJECTION AS NEEDED
Status: DISCONTINUED | OUTPATIENT
Start: 2022-03-02 | End: 2022-03-02 | Stop reason: HOSPADM

## 2022-03-02 RX ORDER — KETOROLAC TROMETHAMINE 15 MG/ML
15 INJECTION, SOLUTION INTRAMUSCULAR; INTRAVENOUS ONCE
Status: COMPLETED | OUTPATIENT
Start: 2022-03-02 | End: 2022-03-02

## 2022-03-02 RX ORDER — DICYCLOMINE HYDROCHLORIDE 10 MG/1
20 CAPSULE ORAL ONCE
Status: COMPLETED | OUTPATIENT
Start: 2022-03-02 | End: 2022-03-02

## 2022-03-02 RX ADMIN — KETOROLAC TROMETHAMINE 15 MG: 15 INJECTION, SOLUTION INTRAMUSCULAR; INTRAVENOUS at 20:21

## 2022-03-02 RX ADMIN — ONDANSETRON 4 MG: 2 INJECTION INTRAMUSCULAR; INTRAVENOUS at 18:26

## 2022-03-02 RX ADMIN — POLYETHYLENE GLYCOL-3350 AND ELECTROLYTES 4000 ML: 236; 6.74; 5.86; 2.97; 22.74 POWDER, FOR SOLUTION ORAL at 21:20

## 2022-03-02 RX ADMIN — SODIUM CHLORIDE 1000 ML: 9 INJECTION, SOLUTION INTRAVENOUS at 18:26

## 2022-03-02 RX ADMIN — DICYCLOMINE HYDROCHLORIDE 20 MG: 10 CAPSULE ORAL at 20:24

## 2022-03-02 RX ADMIN — IOPAMIDOL 80 ML: 612 INJECTION, SOLUTION INTRAVENOUS at 18:57

## 2022-03-02 NOTE — PROGRESS NOTES
Angela Warren  1950  [id number]  Patient Care Team:  Kris Malin MD as PCP - General (Internal Medicine)  Justin Cardoso MD as Consulting Physician (Ophthalmology)  Justin Parrish MD as Consulting Physician (Dermatology)  Derrick Harding II, MD (Pain Medicine)  Stan Lozoya MD as Consulting Physician (Orthopedic Surgery)  Mehdi Petit MD as Consulting Physician (Neurology)  Naseem Zavala MD as Consulting Physician (Gastroenterology)    Angela Warren is a 71 y.o. female here today for follow up.     This patient is accompanied by their self who contributes to the history of their care.    Chief Complaint:    Chief Complaint   Patient presents with   • Constipation     has not had a bowl movement in 8 days. Has pain and short of breath.    • Nausea         History of Present Illness:  I have reviewed and/or updated the patient's past medical, past surgical, family, social history, problem list and allergies as appropriate.      Could not get colonoscopy as her son was dx'd with bladder cancer after having covid. Reports Constipation despite miralax/OJ q AM and  laxatives and  2 fleets enemas. 2/2 to this she has difficulty breathing. Reports nausea but no emesis. No prior abdominal surgeries. Pain persists despite pain medication- hydrocodone. No recent blood in stool. Still with ongoing weight loss. Scant oral intake. Has never had constipation  Before. Has chills but no fever.  Last good meal was 4 days ago.    Review of Systems   Constitutional: Positive for appetite change and fatigue.   HENT: Negative.    Respiratory: Positive for shortness of breath.    Gastrointestinal: Positive for abdominal distention, abdominal pain, constipation and nausea.   Endocrine: Negative.    Genitourinary: Negative.    Musculoskeletal: Positive for back pain.   Hematological: Negative.    Psychiatric/Behavioral: Positive for sleep disturbance. The patient is nervous/anxious.   "      Vitals:    03/02/22 1228   BP: 160/92   Pulse: 103   SpO2: 95%   Weight: 45.3 kg (99 lb 12.8 oz)   Height: 157.5 cm (62.01\")   PainSc:   7   PainLoc: Abdomen     Body mass index is 18.25 kg/m².    Physical Exam  Vitals reviewed.   Constitutional:       Comments: Ill-appearing, mild distress weight down 8 pounds since November   HENT:      Head: Normocephalic.      Mouth/Throat:      Comments: Dry mucosa  Cardiovascular:      Rate and Rhythm: Tachycardia present.      Pulses: Normal pulses.   Pulmonary:      Comments: Prolonged expiratory phase  Abdominal:      General: Bowel sounds are normal. There is distension.      Palpations: There is no mass.      Tenderness: There is abdominal tenderness. There is guarding. There is no rebound.      Hernia: No hernia is present.      Comments: Scaphoid abdomen generalized tenderness of her abdomen more than lower abdomen significant voluntary guarding.  Hyperactive bowel sounds noted no apparent rebound   Musculoskeletal:      Cervical back: Normal range of motion and neck supple.      Comments: Normal gait   Lymphadenopathy:      Cervical: Cervical adenopathy present.   Skin:     General: Skin is dry.      Coloration: Skin is pale.   Neurological:      General: No focal deficit present.      Mental Status: She is oriented to person, place, and time.   Psychiatric:      Comments: Very anxious and tearful         Procedures    Results Review:    I reviewed the patient's new clinical results.    Assessment/Plan:  71-year-old female with progressive weight loss now with a day history of obstipation nausea and vomiting.  Concerns over mechanical obstruction.  She was supposed to have had a colonoscopy done last fall however her son became ill with COVID of bladder cancer.  She focused all of her attention on him until this presentation.  I discussed the case with the ER staff.  I think she needs any imaging labs and hydration at minimum.  If admitted she would benefit from " colonoscopy however I will resubmit a referral for outpatient colonoscopy  Problem List Items Addressed This Visit        Endocrine and Metabolic    Weight loss - Primary    Relevant Orders    Ambulatory Referral to Gastroenterology       Gastrointestinal Abdominal     Bilious vomiting with nausea      Other Visit Diagnoses     Pain of upper abdomen        Relevant Orders    Ambulatory Referral to Gastroenterology    Obstipation        Relevant Orders    Ambulatory Referral to Gastroenterology          Plan of care reviewed with patient at the conclusion of today's visit. Education was provided regarding diagnosis and management.  Patient verbalizes understanding of and agreement with management plan.    Follow-up after ER discharge  Kris Malin MD      Please note than portions of this note were completed [employer] a Voice Recognition Program

## 2022-03-03 LAB
QT INTERVAL: 308 MS
QTC INTERVAL: 399 MS

## 2022-03-03 NOTE — ED PROVIDER NOTES
"Subjective   71-year-old female sent to the emergency department by her primary care physician to be evaluated for abdominal pain.  The patient states that for the past 8 days she has been unable to have a bowel movement.  As result, she states that she feels as if her abdomen is distended and she notes gradually worsening pain in her supraumbilical and epigastric regions.  She endorses nausea but no vomiting.  She states that she is having a difficult time eating secondary to her pain.  She saw her primary care physician and was advised to come to the emergency department for a \"possible bowel obstruction.\"  Of note, the patient has recently been on narcotics, and this is a likely contributing factor to her current symptoms.  She currently rates her pain at 9 out of 10 in severity.  No alleviating factors.  She states that she has tried 2 Fleet enemas without any relief.          Review of Systems   Constitutional: Positive for appetite change.   Gastrointestinal: Positive for abdominal pain and constipation.   All other systems reviewed and are negative.      Past Medical History:   Diagnosis Date   • Acid reflux    • Anxiety    • Arthritis    • Cataract    • CKD (chronic kidney disease) stage 2, GFR 60-89 ml/min 2018   • Colon polyp    • Depression    • GERD (gastroesophageal reflux disease)    • Headache    • Heart murmur    • History of blood transfusion 1971    after surgery    • Hypertension 2018   • Infectious viral hepatitis    • Mild aortic regurgitation 01/03/2019   • MS (multiple sclerosis) (HCC)    • Pneumonia    • Prediabetes 12/12/2019       Allergies   Allergen Reactions   • Penicillins Anaphylaxis   • Lisinopril Rash and Cough       Past Surgical History:   Procedure Laterality Date   • ANKLE OPEN REDUCTION INTERNAL FIXATION Left 12/28/2019    Procedure: ANKLE OPEN REDUCTION INTERNAL FIXATION;  Surgeon: Stan Lozoya MD;  Location: [facility];  Service: Orthopedics   • CATARACT EXTRACTION  " 2009   • CERVICAL BIOPSY  1971    benign   • COLONOSCOPY     • HEMORRHOIDECTOMY  1971   • TUBAL ABDOMINAL LIGATION  1985       Family History   Problem Relation Age of Onset   • Thyroid disease Mother    • Hyperlipidemia Sister    • Thyroid disease Sister    • Other Sister         parathyroid disease   • Hypertension Brother    • Migraines Brother    • Other Brother         kidney stones   • Other Niece         parathyroid disease   • Breast cancer Neg Hx    • Ovarian cancer Neg Hx    • Colon cancer Neg Hx    • Colon polyps Neg Hx    • Esophageal cancer Neg Hx        Social History     Socioeconomic History   • Marital status:    Tobacco Use   • Smoking status: Light Tobacco Smoker     Packs/day: 0.50     Types: Cigarettes   • Smokeless tobacco: Never Used   Vaping Use   • Vaping Use: Never used   Substance and Sexual Activity   • Alcohol use: No   • Drug use: No   • Sexual activity: Defer           Objective   Physical Exam  Vitals and nursing note reviewed.   Constitutional:       Appearance: She is well-developed. She is not diaphoretic.      Comments: Anxious appearing female   HENT:      Head: Normocephalic and atraumatic.   Eyes:      Pupils: Pupils are equal, round, and reactive to light.   Cardiovascular:      Rate and Rhythm: Normal rate and regular rhythm.      Heart sounds: Normal heart sounds. No murmur heard.  No friction rub. No gallop.    Pulmonary:      Effort: Pulmonary effort is normal. No respiratory distress.      Breath sounds: Normal breath sounds. No wheezing or rales.   Abdominal:      General: Bowel sounds are normal. There is no distension.      Palpations: Abdomen is soft. There is no mass.      Tenderness: There is abdominal tenderness. There is guarding. There is no rebound.      Comments: Focal supraumbilical tenderness noted, very mild abdominal distention present, no pain out of proportion to exam   Musculoskeletal:         General: Normal range of motion.      Cervical back:  Neck supple.   Skin:     General: Skin is warm and dry.      Findings: No erythema or rash.   Neurological:      Mental Status: She is alert and oriented to person, place, and time.   Psychiatric:         Mood and Affect: Mood normal.         Thought Content: Thought content normal.         Judgment: Judgment normal.         Procedures           ED Course  ED Course as of 03/03/22 0115   Wed Mar 02, 2022   1649 71-year-old female presents for evaluation of mid and upper abdominal pain, abdominal distention, and constipation for the past 8 days.  She was sent here by her primary care physician to be evaluated for potential bowel obstruction.  On arrival to the ED, the patient is nontoxic-appearing.  She has focal upper abdominal tenderness with guarding noted.  She is mildly distended.  No pain out of proportion to exam.  Labs remarkable only for mild hyponatremia.  We will obtain advanced imaging, and we will reassess following initial interventions. [DD]   1650 I personally viewed the patient's x-ray images myself, and I am in agreement with the radiologist's reading for final interpretation.     [DD]   2017 CT is negative for obstruction or emergent/surgical intra-abdominal process.  I offered to attempt manual disimpaction, but the patient declined.  Prescription for Bentyl.  We will give the patient GoLYTELY to go home with.  Counseled regarding dietary management.  Encouraged oral hydration.  She will follow-up with her primary care physician within the next week.  Agreeable with plan and given appropriate strict return precautions. [DD]      ED Course User Index  [DD] Dashawn Nuñez MD                                          Recent Results (from the past 24 hour(s))   Comprehensive Metabolic Panel    Collection Time: 03/02/22  2:09 PM    Specimen: Blood   Result Value Ref Range    Glucose 112 (H) 65 - 99 mg/dL    BUN 8 8 - 23 mg/dL    Creatinine 0.68 0.57 - 1.00 mg/dL    Sodium 128 (L) 136 - 145 mmol/L     Potassium 4.2 3.5 - 5.2 mmol/L    Chloride 90 (L) 98 - 107 mmol/L    CO2 26.0 22.0 - 29.0 mmol/L    Calcium 10.1 8.6 - 10.5 mg/dL    Total Protein 8.0 6.0 - 8.5 g/dL    Albumin 4.70 3.50 - 5.20 g/dL    ALT (SGPT) 12 1 - 33 U/L    AST (SGOT) 19 1 - 32 U/L    Alkaline Phosphatase 109 39 - 117 U/L    Total Bilirubin 0.2 0.0 - 1.2 mg/dL    Globulin 3.3 gm/dL    A/G Ratio 1.4 g/dL    BUN/Creatinine Ratio 11.8 7.0 - 25.0    Anion Gap 12.0 5.0 - 15.0 mmol/L    eGFR 92.1 >60.0 mL/min/1.73   Lipase    Collection Time: 03/02/22  2:09 PM    Specimen: Blood   Result Value Ref Range    Lipase 20 13 - 60 U/L   Troponin    Collection Time: 03/02/22  2:09 PM    Specimen: Blood   Result Value Ref Range    Troponin T 0.019 0.000 - 0.030 ng/mL   Green Top (Gel)    Collection Time: 03/02/22  2:09 PM   Result Value Ref Range    Extra Tube Hold for add-ons.    Lavender Top    Collection Time: 03/02/22  2:09 PM   Result Value Ref Range    Extra Tube hold for add-on    Gold Top - SST    Collection Time: 03/02/22  2:09 PM   Result Value Ref Range    Extra Tube Hold for add-ons.    Gray Top    Collection Time: 03/02/22  2:09 PM   Result Value Ref Range    Extra Tube Hold for add-ons.    Light Blue Top    Collection Time: 03/02/22  2:09 PM   Result Value Ref Range    Extra Tube hold for add-on    CBC Auto Differential    Collection Time: 03/02/22  2:09 PM    Specimen: Blood   Result Value Ref Range    WBC 9.02 3.40 - 10.80 10*3/mm3    RBC 4.20 3.77 - 5.28 10*6/mm3    Hemoglobin 13.6 12.0 - 15.9 g/dL    Hematocrit 40.6 34.0 - 46.6 %    MCV 96.7 79.0 - 97.0 fL    MCH 32.4 26.6 - 33.0 pg    MCHC 33.5 31.5 - 35.7 g/dL    RDW 13.9 12.3 - 15.4 %    RDW-SD 49.8 37.0 - 54.0 fl    MPV 8.8 6.0 - 12.0 fL    Platelets 436 140 - 450 10*3/mm3    Neutrophil % 68.8 42.7 - 76.0 %    Lymphocyte % 22.1 19.6 - 45.3 %    Monocyte % 7.6 5.0 - 12.0 %    Eosinophil % 0.8 0.3 - 6.2 %    Basophil % 0.3 0.0 - 1.5 %    Immature Grans % 0.4 0.0 - 0.5 %    Neutrophils,  Absolute 6.20 1.70 - 7.00 10*3/mm3    Lymphocytes, Absolute 1.99 0.70 - 3.10 10*3/mm3    Monocytes, Absolute 0.69 0.10 - 0.90 10*3/mm3    Eosinophils, Absolute 0.07 0.00 - 0.40 10*3/mm3    Basophils, Absolute 0.03 0.00 - 0.20 10*3/mm3    Immature Grans, Absolute 0.04 0.00 - 0.05 10*3/mm3    nRBC 0.0 0.0 - 0.2 /100 WBC   Urinalysis With Microscopic If Indicated (No Culture) - Urine, Clean Catch    Collection Time: 03/02/22  7:15 PM    Specimen: Urine, Clean Catch   Result Value Ref Range    Color, UA Yellow Yellow, Straw    Appearance, UA Clear Clear    pH, UA 7.5 5.0 - 8.0    Specific Gravity, UA 1.017 1.001 - 1.030    Glucose, UA Negative Negative    Ketones, UA Negative Negative    Bilirubin, UA Negative Negative    Blood, UA Negative Negative    Protein, UA Negative Negative    Leuk Esterase, UA Negative Negative    Nitrite, UA Negative Negative    Urobilinogen, UA 0.2 E.U./dL 0.2 - 1.0 E.U./dL     Note: In addition to lab results from this visit, the labs listed above may include labs taken at another facility or during a different encounter within the last 24 hours. Please correlate lab times with ED admission and discharge times for further clarification of the services performed during this visit.    CT Abdomen Pelvis With Contrast   Final Result   1. Mild diffuse intra and extra hepatic biliary ductal dilatation.   Please correlate with patient's serum liver chemistries.   2. Subjectively, there appears to be mucosal enhancement and mild   thickening of the stomach and duodenum, questionable for   gastroduodenitis.   3. Nonobstructing left renal calculus.   4. Mild to moderate fecal stasis.   5. No other evidence of acute intra-abdominal or intrapelvic disease is   seen.           This report was finalized on 3/2/2022 8:07 PM by Dr. Rafael Dias MD.          XR Chest 1 View   Final Result   No evidence of acute disease in the chest.        This report was finalized on 3/2/2022 2:27 PM by Kris Funez.      "       Vitals:    03/02/22 1340   BP: (!) 188/126   BP Location: Left arm   Patient Position: Sitting   Pulse: 109   Resp: 18   Temp: 98.3 °F (36.8 °C)   TempSrc: Oral   SpO2: 93%   Weight: 44.9 kg (99 lb)   Height: 157.5 cm (62\")     Medications   sodium chloride 0.9 % bolus 1,000 mL (0 mL Intravenous Stopped 3/2/22 2008)   ondansetron (ZOFRAN) injection 4 mg (4 mg Intravenous Given 3/2/22 1826)   iopamidol (ISOVUE-300) 61 % injection 100 mL (80 mL Intravenous Given 3/2/22 1857)   ketorolac (TORADOL) injection 15 mg (15 mg Intravenous Given 3/2/22 2021)   dicyclomine (BENTYL) capsule 20 mg (20 mg Oral Given 3/2/22 2024)   polyethylene glycol (GoLYTELY) solution 4,000 mL (4,000 mL Oral Given 3/2/22 2120)     ECG/EMG Results (last 24 hours)     Procedure Component Value Units Date/Time    ECG 12 Lead [[id number]] Collected: 03/02/22 1402     Updated: 03/02/22 1640        ECG 12 Lead   Final Result   Test Reason : Upper Abdominal Pain Triage Protocol   Blood Pressure :   */*   mmHG   Vent. Rate : 101 BPM     Atrial Rate : 101 BPM      P-R Int : 174 ms          QRS Dur :  72 ms       QT Int : 308 ms       P-R-T Axes :  90  18  84 degrees      QTc Int : 399 ms      Sinus tachycardia   Right atrial enlargement   Borderline ECG   Confirmed by MD Savannah, Sridhar ([id number]) on 3/3/2022 1:18:12 AM      Referred By:            Confirmed By: Sridhar Nuñez MD                  [facility]    Final diagnoses:   Other constipation   Hyponatremia       ED Disposition  ED Disposition     ED Disposition Condition Comment    Discharge Stable           Kris Malin MD  [address] [place] [address] [phone]    In 1 week           Medication List      New Prescriptions    dicyclomine 20 MG tablet  Commonly known as: BENTYL  Take 1 tablet by mouth Every 8 (Eight) Hours As Needed (pain, cramps).           Where to Get Your Medications      These medications were sent to [facility]/pharmacy #[id number] - [place], [place] - [address] - " [phone] [facility] [phone] FX  [address] [facility] [address]    Phone: [phone]   · dicyclomine 20 MG tablet          Dashawn Nuñez MD  03/03/22 0110

## 2022-03-04 ENCOUNTER — TELEPHONE (OUTPATIENT)
Dept: FAMILY MEDICINE CLINIC | Facility: CLINIC | Age: 72
End: 2022-03-04

## 2022-03-04 RX ORDER — ONDANSETRON HYDROCHLORIDE 8 MG/1
8 TABLET, FILM COATED ORAL EVERY 8 HOURS PRN
Qty: 30 TABLET | Refills: 2 | Status: SHIPPED | OUTPATIENT
Start: 2022-03-04 | End: 2023-01-05

## 2022-03-04 NOTE — TELEPHONE ENCOUNTER
Patient informed of zofran being sent in.   Also scheduled her for a hospital follow up on 3/9/2022. She is waiting to hear back for an appointment with GI currently.

## 2022-03-04 NOTE — TELEPHONE ENCOUNTER
PATIENT HAVING NAUSEA AND VOMITING.  WOULD LIKE MEDICATION FOR THIS.  SPENT 9 HOURS IN THE ER.      PHARMACY:  [facility]    PLEASE CALL [phone]

## 2022-04-03 ENCOUNTER — APPOINTMENT (OUTPATIENT)
Dept: PREADMISSION TESTING | Facility: HOSPITAL | Age: 72
End: 2022-04-03

## 2022-04-08 RX ORDER — BISOPROLOL FUMARATE 10 MG/1
TABLET, FILM COATED ORAL
Qty: 90 TABLET | Refills: 1 | Status: SHIPPED | OUTPATIENT
Start: 2022-04-08 | End: 2023-01-05

## 2022-04-08 RX ORDER — OMEPRAZOLE 40 MG/1
CAPSULE, DELAYED RELEASE ORAL
Qty: 90 CAPSULE | Refills: 1 | Status: SHIPPED | OUTPATIENT
Start: 2022-04-08 | End: 2023-01-05

## 2022-04-08 NOTE — TELEPHONE ENCOUNTER
Rx Refill Note  Requested Prescriptions     Pending Prescriptions Disp Refills   • bisoprolol (ZEBeta) 10 MG tablet [Pharmacy Med Name: BISOPROLOL FUMARATE 10 MG TAB] 90 tablet 3     Sig: TAKE 1 TABLET BY MOUTH EVERY DAY   • omeprazole (priLOSEC) 40 MG capsule [Pharmacy Med Name: OMEPRAZOLE DR 40 MG CAPSULE] 90 capsule 2     Sig: TAKE 1 CAPSULE BY MOUTH EVERY DAY      Last office visit with prescribing clinician: 3/2/2022      Next office visit with prescribing clinician: Visit date not found            Michelle Ortiz MA  04/08/22, 09:52 EDT

## 2022-05-06 DIAGNOSIS — G35 MULTIPLE SCLEROSIS: ICD-10-CM

## 2022-05-06 RX ORDER — OXCARBAZEPINE 300 MG/1
TABLET, FILM COATED ORAL
Qty: 180 TABLET | Refills: 1 | Status: SHIPPED | OUTPATIENT
Start: 2022-05-06 | End: 2022-09-26

## 2022-05-06 NOTE — TELEPHONE ENCOUNTER
Rx Refill Note  Requested Prescriptions     Pending Prescriptions Disp Refills   • OXcarbazepine (TRILEPTAL) 300 MG tablet [Pharmacy Med Name: OXCARBAZEPINE 300 MG TABLET] 180 tablet 1     Sig: TAKE 1 TABLET BY MOUTH TWICE A DAY      Last filled: 10/5/21 90 day with 3 refills  Switching to Damaso's name as Rosa is leaving the office. Filled under Damaso  Last office visit with prescribing clinician: 10/5/2021    Next office visit with prescribing clinician: 10/6/2022    Mojgan Hollins MA  05/06/22, 09:58 EDT

## 2022-06-21 RX ORDER — ALBUTEROL SULFATE 90 UG/1
AEROSOL, METERED RESPIRATORY (INHALATION)
Qty: 18 G | Refills: 6 | Status: SHIPPED | OUTPATIENT
Start: 2022-06-21

## 2022-06-21 NOTE — TELEPHONE ENCOUNTER
Rx Refill Note  Requested Prescriptions     Pending Prescriptions Disp Refills   • albuterol sulfate  (90 Base) MCG/ACT inhaler [Pharmacy Med Name: ALBUTEROL HFA (VENTOLIN) INH]  6     Sig: INHALE 2 PUFFS BY MOUTH EVERY 4 HOURS AS NEEDED FOR WHEEZE      Last office visit with prescribing clinician: 3/2/2022      Next office visit with prescribing clinician: Visit date not found 23}  Sujata Esquivel MA  06/21/22, 09:00 EDT     Last fill: 11/08/2021

## 2022-07-26 RX ORDER — QUETIAPINE FUMARATE 100 MG/1
TABLET, FILM COATED ORAL
Qty: 90 TABLET | Refills: 6 | Status: SHIPPED | OUTPATIENT
Start: 2022-07-26

## 2022-07-26 NOTE — TELEPHONE ENCOUNTER
Rx Refill Note  Requested Prescriptions     Pending Prescriptions Disp Refills   • QUEtiapine (SEROquel) 100 MG tablet [Pharmacy Med Name: QUETIAPINE FUMARATE 100 MG TAB] 90 tablet 6     Sig: TAKE 1 TABLET BY MOUTH EVERY DAY AT NIGHT      Last filled: 06/23/2021 90 with 6 refills.  Last office visit with prescribing clinician: 10/5/2021      Next office visit with prescribing clinician: 10/6/2022     Sent in 90 with 6 refills.    Makayla Olivas MA  07/26/22, 09:38 EDT

## 2022-08-22 RX ORDER — TIZANIDINE 4 MG/1
TABLET ORAL
Qty: 180 TABLET | Refills: 3 | Status: SHIPPED | OUTPATIENT
Start: 2022-08-22 | End: 2023-01-05 | Stop reason: SDUPTHER

## 2022-08-22 NOTE — TELEPHONE ENCOUNTER
Rx Refill Note  Requested Prescriptions     Pending Prescriptions Disp Refills   • tiZANidine (ZANAFLEX) 4 MG tablet [Pharmacy Med Name: TIZANIDINE HCL 4 MG TABLET] 180 tablet 3     Sig: TAKE 1 TABLET BY MOUTH TWICE A DAY      Last filled:10/05/2021 90 day supply with 3 refills sent this in   Last office visit with prescribing clinician: 10/5/2021      Next office visit with prescribing clinician: 10/6/2022     Sandra Ruiz MA  08/22/22, 08:16 EDT

## 2022-08-25 RX ORDER — MIRTAZAPINE 30 MG/1
TABLET, FILM COATED ORAL
Qty: 90 TABLET | Refills: 3 | Status: SHIPPED | OUTPATIENT
Start: 2022-08-25 | End: 2023-01-05 | Stop reason: SDUPTHER

## 2022-08-25 NOTE — TELEPHONE ENCOUNTER
Rx Refill Note  Requested Prescriptions     Pending Prescriptions Disp Refills   • mirtazapine (REMERON) 30 MG tablet [Pharmacy Med Name: MIRTAZAPINE 30 MG TABLET] 90 tablet 3     Sig: TAKE 1 TABLET BY MOUTH EVERYDAY AT BEDTIME      Last filled:10/05/2021 90 day supply with 3 refills sent in   Last office visit with prescribing clinician: 10/5/2021      Next office visit with prescribing clinician: 10/6/2022     Mojgan Hollins MA  08/25/22, 15:44 EDT

## 2022-09-26 DIAGNOSIS — G35 MULTIPLE SCLEROSIS: ICD-10-CM

## 2022-09-26 RX ORDER — OXCARBAZEPINE 300 MG/1
TABLET, FILM COATED ORAL
Qty: 180 TABLET | Refills: 1 | Status: SHIPPED | OUTPATIENT
Start: 2022-09-26 | End: 2023-01-05 | Stop reason: SDUPTHER

## 2022-09-26 NOTE — TELEPHONE ENCOUNTER
Rx Refill Note  Requested Prescriptions     Pending Prescriptions Disp Refills   • OXcarbazepine (TRILEPTAL) 300 MG tablet [Pharmacy Med Name: OXCARBAZEPINE 300 MG TABLET] 180 tablet 1     Sig: TAKE 1 TABLET BY MOUTH TWICE A DAY      Last filled: 5/6/22 90 day with 1 refill sent this in.   Last office visit with prescribing clinician: 10/5/2021      Next office visit with prescribing clinician: 10/6/2022     Mojgan Hollins MA  09/26/22, 09:33 EDT

## 2023-01-05 ENCOUNTER — OFFICE VISIT (OUTPATIENT)
Dept: NEUROLOGY | Facility: CLINIC | Age: 73
End: 2023-01-05
Payer: MEDICARE

## 2023-01-05 VITALS
BODY MASS INDEX: 17.66 KG/M2 | HEIGHT: 62 IN | OXYGEN SATURATION: 95 % | HEART RATE: 94 BPM | DIASTOLIC BLOOD PRESSURE: 84 MMHG | WEIGHT: 96 LBS | SYSTOLIC BLOOD PRESSURE: 142 MMHG

## 2023-01-05 DIAGNOSIS — G35 MS (MULTIPLE SCLEROSIS): Primary | Chronic | ICD-10-CM

## 2023-01-05 DIAGNOSIS — G35 MULTIPLE SCLEROSIS: ICD-10-CM

## 2023-01-05 DIAGNOSIS — F32.9 REACTIVE DEPRESSION: Chronic | ICD-10-CM

## 2023-01-05 DIAGNOSIS — M79.2 NEUROPATHIC PAIN: Chronic | ICD-10-CM

## 2023-01-05 PROCEDURE — 99214 OFFICE O/P EST MOD 30 MIN: CPT | Performed by: PSYCHIATRY & NEUROLOGY

## 2023-01-05 RX ORDER — TIZANIDINE 4 MG/1
4 TABLET ORAL 2 TIMES DAILY
Qty: 180 TABLET | Refills: 3 | Status: SHIPPED | OUTPATIENT
Start: 2023-01-05

## 2023-01-05 RX ORDER — MIRTAZAPINE 30 MG/1
30 TABLET, FILM COATED ORAL
Qty: 90 TABLET | Refills: 3 | Status: SHIPPED | OUTPATIENT
Start: 2023-01-05

## 2023-01-05 RX ORDER — OXCARBAZEPINE 300 MG/1
300 TABLET, FILM COATED ORAL 2 TIMES DAILY
Qty: 180 TABLET | Refills: 1 | Status: SHIPPED | OUTPATIENT
Start: 2023-01-05

## 2023-01-05 NOTE — PROGRESS NOTES
Chief Complaint    Multiple Sclerosis (Clinic)    Subjective        Angela Diaz Parent presents to [facility]UROLOG[facility]     History of Present Illness    72 y.o. female returns in follow up.  Last visit on 10/5/21 continued Zanaflex, OXC, rx Remeron.       MSFC improved      Wt stable at 96      OXC controlling flexor spasms improved.       Hep C positive but inactive.      Problem history:     Dx in 1997 previously followed by Dr Jensen and Taty Montano.   Treated with Rebif two different times for 5 years and off for a year then back on for 7 - 9 years.  Stopped Rebif two months ago.       Last MRI Brain 18 months ago.  Anxiety for small spaces.       Fatigue is severe.       Pain in heads with sharp shooting episodes.  Sits on head and puts them in hot water.  Movement worsens pain.  Sharp shooting pains down shins once a week.       Gait unsteady.       Attention and concentration decreasing.       Reviewed medical records:     Followed by pain management treated with narcotics.  Complains of pain in hands.  Taking Xanax for anxiety referred to Psychiatry.       Hep C ab reactive; quantitation not detected.    Objective   Vital Signs:  /84   Pulse 94   Ht 157.5 cm (62.01\")   Wt 43.5 kg (96 lb)   SpO2 95%   BMI 17.55 kg/m²   Estimated body mass index is 17.55 kg/m² as calculated from the following:    Height as of this encounter: 157.5 cm (62.01\").    Weight as of this encounter: 43.5 kg (96 lb).          Physical Exam  Eyes:      Extraocular Movements: EOM normal.      Pupils: Pupils are equal, round, and reactive to light.   Neurological:      Mental Status: She is oriented to person, place, and time.      Cranial Nerves: Cranial nerves 2-12 are intact.      Motor: Motor strength is normal.      Gait: Gait is intact.   Psychiatric:         Speech: Speech normal.          Neurologic Exam     Mental Status   Oriented to person, place, and time.   Speech: speech is normal    Level of consciousness: alert  Knowledge: good and consistent with education.   Normal comprehension.     Cranial Nerves   Cranial nerves II through XII intact.     CN II   Visual fields full to confrontation.   Visual acuity: normal  Right visual field deficit: none  Left visual field deficit: none     CN III, IV, VI   Pupils are equal, round, and reactive to light.  Extraocular motions are normal.   Nystagmus: none   Diplopia: none  Ophthalmoparesis: none  Upgaze: normal  Downgaze: normal  Conjugate gaze: present    CN V   Facial sensation intact.   Right corneal reflex: normal  Left corneal reflex: normal    CN VII   Right facial weakness: none  Left facial weakness: none    CN VIII   Hearing: intact    CN IX, X   Palate: symmetric  Right gag reflex: normal  Left gag reflex: normal    CN XI   Right sternocleidomastoid strength: normal  Left sternocleidomastoid strength: normal    CN XII   Tongue: not atrophic  Fasciculations: absent  Tongue deviation: none    Motor Exam   Muscle bulk: normal  Overall muscle tone: normal    Strength   Strength 5/5 throughout.     Sensory Exam   Light touch normal.     Gait, Coordination, and Reflexes     Gait  Gait: normal    Tremor   Resting tremor: absent  Intention tremor: absent  Action tremor: absent    Reflexes   Reflexes 2+ except as noted.          Result Review :  The following data was reviewed by: Mehdi Petit MD on 01/05/2023:  Common labs    Common Labs 3/2/22 3/2/22    1409 1409   Glucose  112 (A)   BUN  8   Creatinine  0.68   Sodium  128 (A)   Potassium  4.2   Chloride  90 (A)   Calcium  10.1   Albumin  4.70   Total Bilirubin  0.2   Alkaline Phosphatase  109   AST (SGOT)  19   ALT (SGPT)  12   WBC 9.02    Hemoglobin 13.6    Hematocrit 40.6    Platelets 436    (A) Abnormal value       Comments are available for some flowsheets but are not being displayed.                     Assessment and Plan   Diagnoses and all orders for this visit:    1. MS (multiple  sclerosis) (HCC) (Primary)  Assessment & Plan:  MSFC improved from 2022    Off DMT      2. Neuropathic pain  Assessment & Plan:  Spasms improved on OXC       3. Multiple sclerosis (HCC)  -     OXcarbazepine (TRILEPTAL) 300 MG tablet; Take 1 tablet by mouth 2 (Two) Times a Day.  Dispense: 180 tablet; Refill: 1    4. Reactive depression  Assessment & Plan:  Patient's depression is recurrent and is moderate without psychosis. Their depression is currently in partial remission and the condition is unchanged. This will be reassessed at the next regular appointment. F/U as described:patient will continue current medication therapy.      Other orders  -     tiZANidine (ZANAFLEX) 4 MG tablet; Take 1 tablet by mouth 2 (Two) Times a Day.  Dispense: 180 tablet; Refill: 3  -     mirtazapine (REMERON) 30 MG tablet; Take 1 tablet by mouth every night at bedtime.  Dispense: 90 tablet; Refill: 3           Follow Up   No follow-ups on file.  Patient was given instructions and counseling regarding her condition or for health maintenance advice. Please see specific information pulled into the AVS if appropriate.

## 2023-01-05 NOTE — ASSESSMENT & PLAN NOTE
Patient's depression is recurrent and is moderate without psychosis. Their depression is currently in partial remission and the condition is unchanged. This will be reassessed at the next regular appointment. F/U as described:patient will continue current medication therapy.

## 2023-08-03 DIAGNOSIS — G35 MULTIPLE SCLEROSIS: ICD-10-CM

## 2023-08-03 RX ORDER — OXCARBAZEPINE 300 MG/1
TABLET, FILM COATED ORAL
Qty: 180 TABLET | Refills: 1 | Status: SHIPPED | OUTPATIENT
Start: 2023-08-03

## 2023-08-03 RX ORDER — QUETIAPINE FUMARATE 100 MG/1
TABLET, FILM COATED ORAL
Qty: 90 TABLET | Refills: 3 | Status: SHIPPED | OUTPATIENT
Start: 2023-08-03

## 2023-08-15 ENCOUNTER — TELEPHONE (OUTPATIENT)
Dept: NEUROLOGY | Facility: CLINIC | Age: 73
End: 2023-08-15

## 2023-08-15 RX ORDER — METHYLPREDNISOLONE 4 MG/1
TABLET ORAL
Qty: 1 EACH | Refills: 0 | Status: SHIPPED | OUTPATIENT
Start: 2023-08-15

## 2023-08-15 NOTE — TELEPHONE ENCOUNTER
Caller: ALONSO     Best call back number: [phone]         What was the call regarding: PT IS HAVING SPASM IN BOTH HANDS/ PAST WRIST  NEEDS APPT ASAP. OR PHONE CALL TO LET HER KNOW WHAT CAN BE DONE? TODAY IF POSSIBLE I SCHED IN NOV. BUT NEEDS SOMETHING SOONER . PLEASE CALL TO ADVISE.       Is it okay if the provider responds through [employer]: NO CALL       IF CALL IN RX SEND TO     [facility]/pharmacy #[id number] - [place], [place] - [address] [phone] [facility] [phone]  [phone]

## 2023-08-24 ENCOUNTER — OFFICE VISIT (OUTPATIENT)
Dept: FAMILY MEDICINE CLINIC | Facility: CLINIC | Age: 73
End: 2023-08-24
Payer: MEDICARE

## 2023-08-24 ENCOUNTER — LAB (OUTPATIENT)
Dept: LAB | Facility: HOSPITAL | Age: 73
End: 2023-08-24
Payer: MEDICARE

## 2023-08-24 VITALS
HEIGHT: 62 IN | BODY MASS INDEX: 16.82 KG/M2 | WEIGHT: 91.4 LBS | SYSTOLIC BLOOD PRESSURE: 152 MMHG | OXYGEN SATURATION: 98 % | DIASTOLIC BLOOD PRESSURE: 84 MMHG | TEMPERATURE: 98.6 F | HEART RATE: 83 BPM

## 2023-08-24 DIAGNOSIS — R76.8 HEPATITIS C ANTIBODY TEST POSITIVE: ICD-10-CM

## 2023-08-24 DIAGNOSIS — R10.11 RIGHT UPPER QUADRANT PAIN: Primary | ICD-10-CM

## 2023-08-24 DIAGNOSIS — N18.2 CKD (CHRONIC KIDNEY DISEASE) STAGE 2, GFR 60-89 ML/MIN: ICD-10-CM

## 2023-08-24 DIAGNOSIS — Z76.0 MEDICATION REFILL: ICD-10-CM

## 2023-08-24 DIAGNOSIS — R53.82 CHRONIC FATIGUE: ICD-10-CM

## 2023-08-24 DIAGNOSIS — R63.4 WEIGHT LOSS: ICD-10-CM

## 2023-08-24 DIAGNOSIS — R73.03 PREDIABETES: ICD-10-CM

## 2023-08-24 DIAGNOSIS — R63.4 WEIGHT LOSS, UNINTENTIONAL: ICD-10-CM

## 2023-08-24 DIAGNOSIS — J44.9 CHRONIC OBSTRUCTIVE PULMONARY DISEASE, UNSPECIFIED COPD TYPE: ICD-10-CM

## 2023-08-24 PROCEDURE — 85027 COMPLETE CBC AUTOMATED: CPT

## 2023-08-24 PROCEDURE — 80053 COMPREHEN METABOLIC PANEL: CPT

## 2023-08-24 PROCEDURE — 3079F DIAST BP 80-89 MM HG: CPT | Performed by: NURSE PRACTITIONER

## 2023-08-24 PROCEDURE — 87522 HEPATITIS C REVRS TRNSCRPJ: CPT

## 2023-08-24 PROCEDURE — 36415 COLL VENOUS BLD VENIPUNCTURE: CPT

## 2023-08-24 PROCEDURE — 86803 HEPATITIS C AB TEST: CPT

## 2023-08-24 PROCEDURE — 81001 URINALYSIS AUTO W/SCOPE: CPT

## 2023-08-24 PROCEDURE — 84439 ASSAY OF FREE THYROXINE: CPT

## 2023-08-24 PROCEDURE — 99214 OFFICE O/P EST MOD 30 MIN: CPT | Performed by: NURSE PRACTITIONER

## 2023-08-24 PROCEDURE — 3077F SYST BP >= 140 MM HG: CPT | Performed by: NURSE PRACTITIONER

## 2023-08-24 PROCEDURE — 84443 ASSAY THYROID STIM HORMONE: CPT

## 2023-08-24 PROCEDURE — 83036 HEMOGLOBIN GLYCOSYLATED A1C: CPT

## 2023-08-24 RX ORDER — ALBUTEROL SULFATE 90 UG/1
2 AEROSOL, METERED RESPIRATORY (INHALATION) EVERY 4 HOURS PRN
Qty: 18 G | Refills: 6 | Status: SHIPPED | OUTPATIENT
Start: 2023-08-24

## 2023-08-24 NOTE — PROGRESS NOTES
Follow Up Office Note     Patient Name: Angela Warren  : 1950   MRN: [id number]     Chief Complaint:    Chief Complaint   Patient presents with    Establish Care    Abdominal Pain    Weight Loss     Can't gain juaquin, pt states she eats all the time and nothing is working        History of Present Illness: Angela Warren is a 72 y.o. female who presents today to establish care with a new PCP. Patient c/o unintentional weight loss, chronic abdominal pain for the past year.      Subjective      I have reviewed and the following portions of the patient's history were updated as appropriate: past family history, past medical history, past social history, past surgical history and problem list.    Review of Systems:   Review of Systems   Constitutional:  Positive for fatigue and unexpected weight change. Negative for activity change, appetite change, chills, diaphoresis and fever.   Respiratory: Negative.     Cardiovascular: Negative.    Gastrointestinal:  Positive for abdominal pain and nausea. Negative for blood in stool and vomiting.   Genitourinary:  Negative for dysuria and flank pain.      Past Medical History:   Past Medical History:   Diagnosis Date    Acid reflux     Anxiety     Anxiety     Arthritis     Cataract     CKD (chronic kidney disease) stage 2, GFR 60-89 ml/min 2018    Colon polyp     Depression     GERD (gastroesophageal reflux disease)     Headache     Heart murmur     History of blood transfusion 1971    after surgery     Hypertension 2018    Infectious viral hepatitis     Mild aortic regurgitation 2019    MS (multiple sclerosis)     Pneumonia     Prediabetes 2019         Medications:     Current Outpatient Medications:     albuterol sulfate  (90 Base) MCG/ACT inhaler, Inhale 2 puffs Every 4 (Four) Hours As Needed for Wheezing., Disp: 18 g, Rfl: 6    HYDROcodone-acetaminophen (NORCO)  MG per tablet, Every 6 (Six) Hours As Needed., Disp: , Rfl:      "methylPREDNISolone (MEDROL) 4 MG dose pack, Take as directed on package instructions., Disp: 1 each, Rfl: 0    mirtazapine (REMERON) 30 MG tablet, Take 1 tablet by mouth every night at bedtime., Disp: 90 tablet, Rfl: 3    OXcarbazepine (TRILEPTAL) 300 MG tablet, TAKE 1 TABLET BY MOUTH TWICE A DAY, Disp: 180 tablet, Rfl: 1    polyethylene glycol (MIRALAX) powder, Take 17 g by mouth Daily., Disp: 1 each, Rfl: 11    QUEtiapine (SEROquel) 100 MG tablet, TAKE 1 TABLET BY MOUTH EVERY DAY AT NIGHT, Disp: 90 tablet, Rfl: 3    tiZANidine (ZANAFLEX) 4 MG tablet, Take 1 tablet by mouth 2 (Two) Times a Day., Disp: 180 tablet, Rfl: 3    Allergies:   Allergies   Allergen Reactions    Penicillins Anaphylaxis    Lisinopril Rash and Cough         Objective     Physical Exam:  Vital Signs:   Vitals:    08/24/23 1433   BP: 152/84   BP Location: Right arm   Patient Position: Sitting   Cuff Size: Adult   Pulse: 83   Temp: 98.6 øF (37 øC)   TempSrc: Temporal   SpO2: 98%   Weight: 41.5 kg (91 lb 6.4 oz)   Height: 157.5 cm (62\")   PainSc: 0-No pain     Body mass index is 16.72 kg/mý.     Physical Exam  Vitals and nursing note reviewed.   Constitutional:       General: She is not in acute distress.     Appearance: Normal appearance. She is well-developed. She is not ill-appearing, toxic-appearing or diaphoretic.   HENT:      Head: Normocephalic and atraumatic.   Cardiovascular:      Rate and Rhythm: Normal rate and regular rhythm.      Heart sounds: Normal heart sounds.   Pulmonary:      Effort: Pulmonary effort is normal. No respiratory distress.      Breath sounds: Normal breath sounds. No stridor. No wheezing.   Abdominal:      General: There is no distension.      Palpations: Abdomen is soft.      Tenderness: There is abdominal tenderness in the right upper quadrant.   Skin:     General: Skin is warm and dry.   Neurological:      General: No focal deficit present.      Mental Status: She is alert and oriented to person, place, and time. "   Psychiatric:         Mood and Affect: Mood normal.         Behavior: Behavior normal. Behavior is cooperative.         Thought Content: Thought content normal.         Judgment: Judgment normal.       Assessment / Plan      Assessment/Plan:   Diagnoses and all orders for this visit:    1. Right upper quadrant pain (Primary)  Assessment & Plan:  Chronic problem which is worsening according to patient. Plan for diagnostic imaging. Further recommendation after results evaluation.    Orders:  -     US Gallbladder; Future    2. Weight loss, unintentional  Assessment & Plan:  Chronic problem which is progressing. Further workup required. Laboratory studies per orders, further recommendation after results evaluation.    Orders:  -     TSH; Future  -     T4, Free; Future  -     US Gallbladder; Future    3. Prediabetes  -     Hemoglobin A1c; Future    4. Hepatitis C antibody test positive  -     Comprehensive Metabolic Panel; Future  -     Hepatitis C Antibody; Future  -     Hepatitis C Virus (HCV) RNA, Diagnosis; Future    5. CKD (chronic kidney disease) stage 2, GFR 60-89 ml/min  -     Comprehensive Metabolic Panel; Future    6. Chronic fatigue  -     CBC (No Diff); Future  -     TSH; Future  -     T4, Free; Future  -     Urinalysis With Culture If Indicated - Urine, Clean Catch; Future    7. Chronic obstructive pulmonary disease, unspecified COPD type  -     albuterol sulfate  (90 Base) MCG/ACT inhaler; Inhale 2 puffs Every 4 (Four) Hours As Needed for Wheezing.  Dispense: 18 g; Refill: 6  -     CBC (No Diff); Future    8. Medication refill  -     albuterol sulfate  (90 Base) MCG/ACT inhaler; Inhale 2 puffs Every 4 (Four) Hours As Needed for Wheezing.  Dispense: 18 g; Refill: 6       Brief Urine Lab Results  (Last result in the past 365 days)        Color   Clarity   Blood   Leuk Est   Nitrite   Protein   CREAT   Urine HCG        08/24/23 1602 Yellow   Clear   Negative   Negative   Negative   30 mg/dL  (1+)                    Follow Up:   PRN and at next scheduled appointment(s) with PCP.    Discussed the nature of the medical condition(s) risks, complications, implications, management, safe and proper use of medications. Encouraged medication compliance, and keeping scheduled follow up appointments with me and any other providers.      RTC if symptoms fail to improve, to ER if symptoms worsen.        *Dictated Utilizing Dragon Dictation   Please note that portions of this note were completed with a voice recognition program.   Part of this note may be an electronic transcription/translation of spoken language to printed text using the Dragon Dictation System. Spelling and/or grammatical errors may exist despite efforts at proofreading.      NOTE TO PATIENT: The 21st Century Cures Act makes medical notes like these available to patients in the interest of transparency. However, be advised this is a medical document. It is intended as peer to peer communication. It is written in medical language and may contain abbreviations or verbiage that are unfamiliar. It may appear blunt or direct. Medical documents are intended to carry relevant information, facts as evident, and the clinical opinion of the practitioner.      WILLY Reaves  [facility] [place]

## 2023-08-25 LAB
ALBUMIN SERPL-MCNC: 4.6 G/DL (ref 3.5–5.2)
ALBUMIN/GLOB SERPL: 1.6 G/DL
ALP SERPL-CCNC: 111 U/L (ref 39–117)
ALT SERPL W P-5'-P-CCNC: 14 U/L (ref 1–33)
ANION GAP SERPL CALCULATED.3IONS-SCNC: 13.9 MMOL/L (ref 5–15)
AST SERPL-CCNC: 23 U/L (ref 1–32)
BACTERIA UR QL AUTO: ABNORMAL /HPF
BILIRUB SERPL-MCNC: 0.2 MG/DL (ref 0–1.2)
BILIRUB UR QL STRIP: NEGATIVE
BUN SERPL-MCNC: 12 MG/DL (ref 8–23)
BUN/CREAT SERPL: 22.2 (ref 7–25)
CALCIUM SPEC-SCNC: 9.5 MG/DL (ref 8.6–10.5)
CHLORIDE SERPL-SCNC: 94 MMOL/L (ref 98–107)
CLARITY UR: CLEAR
CO2 SERPL-SCNC: 24.1 MMOL/L (ref 22–29)
COLOR UR: YELLOW
CREAT SERPL-MCNC: 0.54 MG/DL (ref 0.57–1)
DEPRECATED RDW RBC AUTO: 46.3 FL (ref 37–54)
EGFRCR SERPLBLD CKD-EPI 2021: 96.7 ML/MIN/1.73
ERYTHROCYTE [DISTWIDTH] IN BLOOD BY AUTOMATED COUNT: 12.9 % (ref 12.3–15.4)
GLOBULIN UR ELPH-MCNC: 2.8 GM/DL
GLUCOSE SERPL-MCNC: 76 MG/DL (ref 65–99)
GLUCOSE UR STRIP-MCNC: NEGATIVE MG/DL
HBA1C MFR BLD: 5.8 % (ref 4.8–5.6)
HCT VFR BLD AUTO: 41.4 % (ref 34–46.6)
HCV AB SER DONR QL: REACTIVE
HGB BLD-MCNC: 13.8 G/DL (ref 12–15.9)
HGB UR QL STRIP.AUTO: NEGATIVE
HYALINE CASTS UR QL AUTO: ABNORMAL /LPF
KETONES UR QL STRIP: NEGATIVE
LEUKOCYTE ESTERASE UR QL STRIP.AUTO: NEGATIVE
MCH RBC QN AUTO: 32.8 PG (ref 26.6–33)
MCHC RBC AUTO-ENTMCNC: 33.3 G/DL (ref 31.5–35.7)
MCV RBC AUTO: 98.3 FL (ref 79–97)
NITRITE UR QL STRIP: NEGATIVE
PH UR STRIP.AUTO: 6.5 [PH] (ref 5–8)
PLATELET # BLD AUTO: 415 10*3/MM3 (ref 140–450)
PMV BLD AUTO: 9 FL (ref 6–12)
POTASSIUM SERPL-SCNC: 4.4 MMOL/L (ref 3.5–5.2)
PROT SERPL-MCNC: 7.4 G/DL (ref 6–8.5)
PROT UR QL STRIP: ABNORMAL
RBC # BLD AUTO: 4.21 10*6/MM3 (ref 3.77–5.28)
RBC # UR STRIP: ABNORMAL /HPF
REF LAB TEST METHOD: ABNORMAL
SODIUM SERPL-SCNC: 132 MMOL/L (ref 136–145)
SP GR UR STRIP: 1.02 (ref 1–1.03)
SQUAMOUS #/AREA URNS HPF: ABNORMAL /HPF
T4 FREE SERPL-MCNC: 0.99 NG/DL (ref 0.93–1.7)
TSH SERPL DL<=0.05 MIU/L-ACNC: 1.61 UIU/ML (ref 0.27–4.2)
UROBILINOGEN UR QL STRIP: ABNORMAL
WBC # UR STRIP: ABNORMAL /HPF
WBC NRBC COR # BLD: 7.79 10*3/MM3 (ref 3.4–10.8)

## 2023-08-26 PROBLEM — R73.03 PREDIABETES: Chronic | Status: ACTIVE | Noted: 2019-12-12

## 2023-08-26 PROBLEM — K21.9 GERD WITHOUT ESOPHAGITIS: Chronic | Status: ACTIVE | Noted: 2019-12-28

## 2023-08-26 PROBLEM — N18.2 CKD (CHRONIC KIDNEY DISEASE) STAGE 2, GFR 60-89 ML/MIN: Chronic | Status: ACTIVE | Noted: 2018-01-01

## 2023-08-26 PROBLEM — I10 ESSENTIAL HYPERTENSION: Chronic | Status: ACTIVE | Noted: 2018-12-10

## 2023-08-26 PROBLEM — J44.9 COPD (CHRONIC OBSTRUCTIVE PULMONARY DISEASE): Chronic | Status: ACTIVE | Noted: 2023-08-24

## 2023-08-26 PROBLEM — R10.11 RIGHT UPPER QUADRANT PAIN: Status: ACTIVE | Noted: 2023-08-26

## 2023-08-26 LAB
HCV RNA SERPL NAA+PROBE-ACNC: NORMAL IU/ML
TEST INFORMATION: NORMAL

## 2023-08-26 NOTE — ASSESSMENT & PLAN NOTE
Chronic problem which is worsening according to patient. Plan for diagnostic imaging. Further recommendation after results evaluation.

## 2023-08-26 NOTE — ASSESSMENT & PLAN NOTE
Chronic problem which is progressing. Further workup required. Laboratory studies per orders, further recommendation after results evaluation.

## 2023-08-29 ENCOUNTER — TELEPHONE (OUTPATIENT)
Dept: FAMILY MEDICINE CLINIC | Facility: CLINIC | Age: 73
End: 2023-08-29

## 2023-08-29 NOTE — TELEPHONE ENCOUNTER
I called patient and gave her all of the details in her lab letter and provider instructions. She verbalized an understanding and appreciation.

## 2023-08-29 NOTE — TELEPHONE ENCOUNTER
PLEASE CALL THE PATIENT TO DISCUSS HER LAB RESULTS,  SOMETHING FOR NAUSEA AND ALSO A ULTRA SOUND WAS DISCUSSED AT THE APPT.  PLEASE CALL  A MESSAGE CAN BE LEFT.

## 2023-09-13 ENCOUNTER — HOSPITAL ENCOUNTER (INPATIENT)
Facility: HOSPITAL | Age: 73
LOS: 7 days | Discharge: NURSING, REHAB OR LONG-TERM CARE | DRG: 190 | End: 2023-09-20
Attending: EMERGENCY MEDICINE | Admitting: INTERNAL MEDICINE
Payer: MEDICARE

## 2023-09-13 ENCOUNTER — APPOINTMENT (OUTPATIENT)
Dept: GENERAL RADIOLOGY | Facility: HOSPITAL | Age: 73
DRG: 190 | End: 2023-09-13
Payer: MEDICARE

## 2023-09-13 DIAGNOSIS — J44.1 COPD EXACERBATION: ICD-10-CM

## 2023-09-13 DIAGNOSIS — D72.825 BANDEMIA: ICD-10-CM

## 2023-09-13 DIAGNOSIS — E87.1 HYPONATREMIA: ICD-10-CM

## 2023-09-13 DIAGNOSIS — J96.21 ACUTE ON CHRONIC RESPIRATORY FAILURE WITH HYPOXIA: Primary | ICD-10-CM

## 2023-09-13 PROBLEM — J96.20 ACUTE-ON-CHRONIC RESPIRATORY FAILURE: Status: ACTIVE | Noted: 2023-09-13

## 2023-09-13 PROBLEM — D72.829 LEUKOCYTOSIS: Status: ACTIVE | Noted: 2023-09-13

## 2023-09-13 LAB
ALBUMIN SERPL-MCNC: 3.8 G/DL (ref 3.5–5.2)
ALBUMIN/GLOB SERPL: 1.1 G/DL
ALP SERPL-CCNC: 148 U/L (ref 39–117)
ALT SERPL W P-5'-P-CCNC: 15 U/L (ref 1–33)
ANION GAP SERPL CALCULATED.3IONS-SCNC: 11 MMOL/L (ref 5–15)
AST SERPL-CCNC: 25 U/L (ref 1–32)
BACTERIA UR QL AUTO: ABNORMAL /HPF
BASOPHILS # BLD AUTO: 0.04 10*3/MM3 (ref 0–0.2)
BASOPHILS NFR BLD AUTO: 0.2 % (ref 0–1.5)
BILIRUB SERPL-MCNC: 0.3 MG/DL (ref 0–1.2)
BILIRUB UR QL STRIP: NEGATIVE
BUN SERPL-MCNC: 10 MG/DL (ref 8–23)
BUN/CREAT SERPL: 17.5 (ref 7–25)
CALCIUM SPEC-SCNC: 9.4 MG/DL (ref 8.6–10.5)
CHLORIDE SERPL-SCNC: 86 MMOL/L (ref 98–107)
CLARITY UR: CLEAR
CO2 SERPL-SCNC: 28 MMOL/L (ref 22–29)
COLOR UR: YELLOW
CREAT SERPL-MCNC: 0.57 MG/DL (ref 0.57–1)
D-LACTATE SERPL-SCNC: 1.4 MMOL/L (ref 0.5–2)
DEPRECATED RDW RBC AUTO: 45.5 FL (ref 37–54)
EGFRCR SERPLBLD CKD-EPI 2021: 95.5 ML/MIN/1.73
EOSINOPHIL # BLD AUTO: 0.03 10*3/MM3 (ref 0–0.4)
EOSINOPHIL NFR BLD AUTO: 0.2 % (ref 0.3–6.2)
ERYTHROCYTE [DISTWIDTH] IN BLOOD BY AUTOMATED COUNT: 13.3 % (ref 12.3–15.4)
FLUAV SUBTYP SPEC NAA+PROBE: NOT DETECTED
FLUBV RNA ISLT QL NAA+PROBE: NOT DETECTED
GLOBULIN UR ELPH-MCNC: 3.5 GM/DL
GLUCOSE SERPL-MCNC: 120 MG/DL (ref 65–99)
GLUCOSE UR STRIP-MCNC: ABNORMAL MG/DL
HCT VFR BLD AUTO: 37.8 % (ref 34–46.6)
HGB BLD-MCNC: 12.9 G/DL (ref 12–15.9)
HGB UR QL STRIP.AUTO: NEGATIVE
HOLD SPECIMEN: NORMAL
HYALINE CASTS UR QL AUTO: ABNORMAL /LPF
IMM GRANULOCYTES # BLD AUTO: 0.3 10*3/MM3 (ref 0–0.05)
IMM GRANULOCYTES NFR BLD AUTO: 1.6 % (ref 0–0.5)
KETONES UR QL STRIP: ABNORMAL
LEUKOCYTE ESTERASE UR QL STRIP.AUTO: NEGATIVE
LYMPHOCYTES # BLD AUTO: 1.7 10*3/MM3 (ref 0.7–3.1)
LYMPHOCYTES NFR BLD AUTO: 8.9 % (ref 19.6–45.3)
MCH RBC QN AUTO: 32 PG (ref 26.6–33)
MCHC RBC AUTO-ENTMCNC: 34.1 G/DL (ref 31.5–35.7)
MCV RBC AUTO: 93.8 FL (ref 79–97)
MONOCYTES # BLD AUTO: 1.55 10*3/MM3 (ref 0.1–0.9)
MONOCYTES NFR BLD AUTO: 8.1 % (ref 5–12)
NEUTROPHILS NFR BLD AUTO: 15.58 10*3/MM3 (ref 1.7–7)
NEUTROPHILS NFR BLD AUTO: 81 % (ref 42.7–76)
NITRITE UR QL STRIP: NEGATIVE
NRBC BLD AUTO-RTO: 0 /100 WBC (ref 0–0.2)
NT-PROBNP SERPL-MCNC: 565 PG/ML (ref 0–900)
PH UR STRIP.AUTO: 7.5 [PH] (ref 5–8)
PLATELET # BLD AUTO: 555 10*3/MM3 (ref 140–450)
PMV BLD AUTO: 8.5 FL (ref 6–12)
POTASSIUM SERPL-SCNC: 4.7 MMOL/L (ref 3.5–5.2)
PROCALCITONIN SERPL-MCNC: 0.22 NG/ML (ref 0–0.25)
PROT SERPL-MCNC: 7.3 G/DL (ref 6–8.5)
PROT UR QL STRIP: ABNORMAL
QT INTERVAL: 332 MS
QTC INTERVAL: 426 MS
RBC # BLD AUTO: 4.03 10*6/MM3 (ref 3.77–5.28)
RBC # UR STRIP: ABNORMAL /HPF
REF LAB TEST METHOD: ABNORMAL
SARS-COV-2 RNA RESP QL NAA+PROBE: NOT DETECTED
SODIUM SERPL-SCNC: 125 MMOL/L (ref 136–145)
SP GR UR STRIP: 1.02 (ref 1–1.03)
SQUAMOUS #/AREA URNS HPF: ABNORMAL /HPF
TROPONIN T SERPL HS-MCNC: 21 NG/L
UROBILINOGEN UR QL STRIP: ABNORMAL
WBC # UR STRIP: ABNORMAL /HPF
WBC NRBC COR # BLD: 19.2 10*3/MM3 (ref 3.4–10.8)
WHOLE BLOOD HOLD COAG: NORMAL
WHOLE BLOOD HOLD SPECIMEN: NORMAL

## 2023-09-13 PROCEDURE — 85025 COMPLETE CBC W/AUTO DIFF WBC: CPT | Performed by: EMERGENCY MEDICINE

## 2023-09-13 PROCEDURE — 99285 EMERGENCY DEPT VISIT HI MDM: CPT

## 2023-09-13 PROCEDURE — 83605 ASSAY OF LACTIC ACID: CPT | Performed by: EMERGENCY MEDICINE

## 2023-09-13 PROCEDURE — 94799 UNLISTED PULMONARY SVC/PX: CPT

## 2023-09-13 PROCEDURE — 99222 1ST HOSP IP/OBS MODERATE 55: CPT | Performed by: INTERNAL MEDICINE

## 2023-09-13 PROCEDURE — 87899 AGENT NOS ASSAY W/OPTIC: CPT | Performed by: INTERNAL MEDICINE

## 2023-09-13 PROCEDURE — 81001 URINALYSIS AUTO W/SCOPE: CPT | Performed by: INTERNAL MEDICINE

## 2023-09-13 PROCEDURE — 87449 NOS EACH ORGANISM AG IA: CPT | Performed by: INTERNAL MEDICINE

## 2023-09-13 PROCEDURE — 83880 ASSAY OF NATRIURETIC PEPTIDE: CPT | Performed by: EMERGENCY MEDICINE

## 2023-09-13 PROCEDURE — 25010000002 HEPARIN (PORCINE) PER 1000 UNITS: Performed by: INTERNAL MEDICINE

## 2023-09-13 PROCEDURE — 71045 X-RAY EXAM CHEST 1 VIEW: CPT

## 2023-09-13 PROCEDURE — 93005 ELECTROCARDIOGRAM TRACING: CPT

## 2023-09-13 PROCEDURE — 36415 COLL VENOUS BLD VENIPUNCTURE: CPT

## 2023-09-13 PROCEDURE — 25010000002 METHYLPREDNISOLONE PER 125 MG: Performed by: EMERGENCY MEDICINE

## 2023-09-13 PROCEDURE — 84145 PROCALCITONIN (PCT): CPT | Performed by: EMERGENCY MEDICINE

## 2023-09-13 PROCEDURE — 80053 COMPREHEN METABOLIC PANEL: CPT | Performed by: EMERGENCY MEDICINE

## 2023-09-13 PROCEDURE — 93005 ELECTROCARDIOGRAM TRACING: CPT | Performed by: EMERGENCY MEDICINE

## 2023-09-13 PROCEDURE — 84484 ASSAY OF TROPONIN QUANT: CPT | Performed by: EMERGENCY MEDICINE

## 2023-09-13 PROCEDURE — 87040 BLOOD CULTURE FOR BACTERIA: CPT | Performed by: EMERGENCY MEDICINE

## 2023-09-13 PROCEDURE — 94640 AIRWAY INHALATION TREATMENT: CPT

## 2023-09-13 PROCEDURE — 87636 SARSCOV2 & INF A&B AMP PRB: CPT | Performed by: EMERGENCY MEDICINE

## 2023-09-13 RX ORDER — IPRATROPIUM BROMIDE AND ALBUTEROL SULFATE 2.5; .5 MG/3ML; MG/3ML
3 SOLUTION RESPIRATORY (INHALATION)
Status: DISCONTINUED | OUTPATIENT
Start: 2023-09-13 | End: 2023-09-20 | Stop reason: HOSPADM

## 2023-09-13 RX ORDER — METHYLPREDNISOLONE SODIUM SUCCINATE 125 MG/2ML
125 INJECTION, POWDER, LYOPHILIZED, FOR SOLUTION INTRAMUSCULAR; INTRAVENOUS ONCE
Status: COMPLETED | OUTPATIENT
Start: 2023-09-13 | End: 2023-09-13

## 2023-09-13 RX ORDER — NITROGLYCERIN 0.4 MG/1
0.4 TABLET SUBLINGUAL
Status: DISCONTINUED | OUTPATIENT
Start: 2023-09-13 | End: 2023-09-20 | Stop reason: HOSPADM

## 2023-09-13 RX ORDER — DOXYCYCLINE 100 MG/1
100 CAPSULE ORAL EVERY 12 HOURS SCHEDULED
Status: COMPLETED | OUTPATIENT
Start: 2023-09-13 | End: 2023-09-20

## 2023-09-13 RX ORDER — HYDROCODONE BITARTRATE AND ACETAMINOPHEN 10; 325 MG/1; MG/1
1 TABLET ORAL EVERY 6 HOURS PRN
Status: DISCONTINUED | OUTPATIENT
Start: 2023-09-13 | End: 2023-09-20 | Stop reason: HOSPADM

## 2023-09-13 RX ORDER — SODIUM CHLORIDE 0.9 % (FLUSH) 0.9 %
10 SYRINGE (ML) INJECTION AS NEEDED
Status: DISCONTINUED | OUTPATIENT
Start: 2023-09-13 | End: 2023-09-20 | Stop reason: HOSPADM

## 2023-09-13 RX ORDER — POLYETHYLENE GLYCOL 3350 17 G/17G
17 POWDER, FOR SOLUTION ORAL DAILY
Status: DISCONTINUED | OUTPATIENT
Start: 2023-09-13 | End: 2023-09-20 | Stop reason: HOSPADM

## 2023-09-13 RX ORDER — LANOLIN ALCOHOL/MO/W.PET/CERES
500 CREAM (GRAM) TOPICAL DAILY
Status: DISCONTINUED | OUTPATIENT
Start: 2023-09-13 | End: 2023-09-20 | Stop reason: HOSPADM

## 2023-09-13 RX ORDER — ONDANSETRON 4 MG/1
4 TABLET, FILM COATED ORAL EVERY 6 HOURS PRN
Status: DISCONTINUED | OUTPATIENT
Start: 2023-09-13 | End: 2023-09-20 | Stop reason: HOSPADM

## 2023-09-13 RX ORDER — IPRATROPIUM BROMIDE AND ALBUTEROL SULFATE 2.5; .5 MG/3ML; MG/3ML
3 SOLUTION RESPIRATORY (INHALATION) ONCE
Status: COMPLETED | OUTPATIENT
Start: 2023-09-13 | End: 2023-09-13

## 2023-09-13 RX ORDER — SODIUM CHLORIDE 9 MG/ML
40 INJECTION, SOLUTION INTRAVENOUS AS NEEDED
Status: DISCONTINUED | OUTPATIENT
Start: 2023-09-13 | End: 2023-09-20 | Stop reason: HOSPADM

## 2023-09-13 RX ORDER — IPRATROPIUM BROMIDE AND ALBUTEROL SULFATE 2.5; .5 MG/3ML; MG/3ML
3 SOLUTION RESPIRATORY (INHALATION) EVERY 4 HOURS PRN
Status: DISCONTINUED | OUTPATIENT
Start: 2023-09-13 | End: 2023-09-20 | Stop reason: HOSPADM

## 2023-09-13 RX ORDER — SODIUM CHLORIDE 9 MG/ML
100 INJECTION, SOLUTION INTRAVENOUS CONTINUOUS
Status: ACTIVE | OUTPATIENT
Start: 2023-09-13 | End: 2023-09-14

## 2023-09-13 RX ORDER — MIRTAZAPINE 15 MG/1
30 TABLET, FILM COATED ORAL NIGHTLY
Status: DISCONTINUED | OUTPATIENT
Start: 2023-09-13 | End: 2023-09-20 | Stop reason: HOSPADM

## 2023-09-13 RX ORDER — OXCARBAZEPINE 150 MG/1
300 TABLET, FILM COATED ORAL 2 TIMES DAILY
Status: DISCONTINUED | OUTPATIENT
Start: 2023-09-13 | End: 2023-09-15

## 2023-09-13 RX ORDER — B-COMPLEX WITH VITAMIN C
1 TABLET ORAL DAILY
COMMUNITY

## 2023-09-13 RX ORDER — SODIUM CHLORIDE 0.9 % (FLUSH) 0.9 %
10 SYRINGE (ML) INJECTION EVERY 12 HOURS SCHEDULED
Status: DISCONTINUED | OUTPATIENT
Start: 2023-09-13 | End: 2023-09-20 | Stop reason: HOSPADM

## 2023-09-13 RX ORDER — ALBUTEROL SULFATE 90 UG/1
2 AEROSOL, METERED RESPIRATORY (INHALATION) EVERY 4 HOURS PRN
Status: DISCONTINUED | OUTPATIENT
Start: 2023-09-13 | End: 2023-09-20 | Stop reason: HOSPADM

## 2023-09-13 RX ORDER — CHOLECALCIFEROL (VITAMIN D3) 125 MCG
500 CAPSULE ORAL DAILY
COMMUNITY

## 2023-09-13 RX ORDER — HEPARIN SODIUM 5000 [USP'U]/ML
5000 INJECTION, SOLUTION INTRAVENOUS; SUBCUTANEOUS EVERY 8 HOURS SCHEDULED
Status: DISCONTINUED | OUTPATIENT
Start: 2023-09-13 | End: 2023-09-20 | Stop reason: HOSPADM

## 2023-09-13 RX ORDER — QUETIAPINE FUMARATE 100 MG/1
100 TABLET, FILM COATED ORAL NIGHTLY
Status: DISCONTINUED | OUTPATIENT
Start: 2023-09-13 | End: 2023-09-20 | Stop reason: HOSPADM

## 2023-09-13 RX ORDER — ACETAMINOPHEN 160 MG/5ML
650 SOLUTION ORAL EVERY 4 HOURS PRN
Status: DISCONTINUED | OUTPATIENT
Start: 2023-09-13 | End: 2023-09-20 | Stop reason: HOSPADM

## 2023-09-13 RX ORDER — SODIUM CHLORIDE 0.9 % (FLUSH) 0.9 %
1-10 SYRINGE (ML) INJECTION AS NEEDED
Status: DISCONTINUED | OUTPATIENT
Start: 2023-09-13 | End: 2023-09-20 | Stop reason: HOSPADM

## 2023-09-13 RX ORDER — METHYLPREDNISOLONE SODIUM SUCCINATE 125 MG/2ML
62.5 INJECTION, POWDER, LYOPHILIZED, FOR SOLUTION INTRAMUSCULAR; INTRAVENOUS DAILY
Status: DISCONTINUED | OUTPATIENT
Start: 2023-09-14 | End: 2023-09-15

## 2023-09-13 RX ORDER — TIZANIDINE 4 MG/1
4 TABLET ORAL 2 TIMES DAILY
Status: DISCONTINUED | OUTPATIENT
Start: 2023-09-13 | End: 2023-09-15

## 2023-09-13 RX ORDER — ONDANSETRON 2 MG/ML
4 INJECTION INTRAMUSCULAR; INTRAVENOUS EVERY 6 HOURS PRN
Status: DISCONTINUED | OUTPATIENT
Start: 2023-09-13 | End: 2023-09-20 | Stop reason: HOSPADM

## 2023-09-13 RX ORDER — ACETAMINOPHEN 650 MG/1
650 SUPPOSITORY RECTAL EVERY 4 HOURS PRN
Status: DISCONTINUED | OUTPATIENT
Start: 2023-09-13 | End: 2023-09-20 | Stop reason: HOSPADM

## 2023-09-13 RX ORDER — BENZONATATE 100 MG/1
200 CAPSULE ORAL 3 TIMES DAILY PRN
Status: DISCONTINUED | OUTPATIENT
Start: 2023-09-13 | End: 2023-09-20 | Stop reason: HOSPADM

## 2023-09-13 RX ORDER — ACETAMINOPHEN 325 MG/1
650 TABLET ORAL EVERY 4 HOURS PRN
Status: DISCONTINUED | OUTPATIENT
Start: 2023-09-13 | End: 2023-09-20 | Stop reason: HOSPADM

## 2023-09-13 RX ADMIN — METHYLPREDNISOLONE SODIUM SUCCINATE 125 MG: 125 INJECTION, POWDER, FOR SOLUTION INTRAMUSCULAR; INTRAVENOUS at 13:16

## 2023-09-13 RX ADMIN — DOXYCYCLINE 100 MG: 100 CAPSULE ORAL at 23:20

## 2023-09-13 RX ADMIN — QUETIAPINE FUMARATE 100 MG: 100 TABLET ORAL at 20:13

## 2023-09-13 RX ADMIN — HEPARIN SODIUM 5000 UNITS: 5000 INJECTION, SOLUTION INTRAVENOUS; SUBCUTANEOUS at 20:15

## 2023-09-13 RX ADMIN — MIRTAZAPINE 30 MG: 15 TABLET, FILM COATED ORAL at 20:13

## 2023-09-13 RX ADMIN — IPRATROPIUM BROMIDE AND ALBUTEROL SULFATE 3 ML: 2.5; .5 SOLUTION RESPIRATORY (INHALATION) at 20:41

## 2023-09-13 RX ADMIN — Medication 10 ML: at 20:14

## 2023-09-13 RX ADMIN — CYANOCOBALAMIN TAB 1000 MCG 500 MCG: 1000 TAB at 20:13

## 2023-09-13 RX ADMIN — SODIUM CHLORIDE 100 ML/HR: 9 INJECTION, SOLUTION INTRAVENOUS at 21:14

## 2023-09-13 RX ADMIN — OXCARBAZEPINE 300 MG: 150 TABLET, FILM COATED ORAL at 20:13

## 2023-09-13 RX ADMIN — TIZANIDINE 4 MG: 4 TABLET ORAL at 20:14

## 2023-09-13 RX ADMIN — IPRATROPIUM BROMIDE AND ALBUTEROL SULFATE 3 ML: 2.5; .5 SOLUTION RESPIRATORY (INHALATION) at 13:28

## 2023-09-13 RX ADMIN — DOXYCYCLINE 100 MG: 100 INJECTION, POWDER, LYOPHILIZED, FOR SOLUTION INTRAVENOUS at 13:16

## 2023-09-13 NOTE — CASE MANAGEMENT/SOCIAL WORK
Discharge Planning Assessment  [facility]     Patient Name: Angela Diaz Parent  MRN: [id number]  Today's Date: 9/13/2023    Admit Date: 9/13/2023    Plan: IDP   Discharge Needs Assessment       Row Name 09/13/23 1525       Living Environment    People in Home child(oscar), adult    Name(s) of People in Home Sal Briones    Current Living Arrangements home    Primary Care Provided by self;child(oscar)    Provides Primary Care For no one, unable/limited ability to care for self    Family Caregiver if Needed child(oscar), adult    Quality of Family Relationships helpful       Transition Planning    Transportation Anticipated family or friend will provide       Discharge Needs Assessment    Readmission Within the Last 30 Days no previous admission in last 30 days    Equipment Currently Used at Home cane, straight;wheelchair;walker, standard    Concerns to be Addressed denies needs/concerns at this time                   Discharge Plan       Row Name 09/13/23 1525       Plan    Plan IDP    Plan Comments MSW met with Pt at bedside to complete IDP. Pt lives with son in [facility]. Pt confirmed demographics and reports PCP is Rosalba Howard. Pt has Medicare and Medicaid insurance coverage. Pt is moderate with ADLs; son helps bath as needed. Pt has cane, walker, and WC. Pt has no HH or O2. Pt’s preferred pharmacy is [facility]. Pt reports son can transport when medically ready. Pt denied needs or concerns. CM will continue to follow and assist with discharge planning.    Final Discharge Disposition Code 30 - still a patient                  Continued Care and Services - Admitted Since 9/13/2023    Coordination has not been started for this encounter.          Demographic Summary       Row Name 09/13/23 1524       General Information    Arrived From home    Referral Source admission list;emergency department    Reason for Consult discharge planning    Preferred Language English                   Functional Status       Row Name 09/13/23  1524       Functional Status    Usual Activity Tolerance moderate    Current Activity Tolerance moderate       Functional Status, IADL    Medications assistive person    Meal Preparation assistive person    Housekeeping assistive person    Laundry assistive person    Shopping assistive person                               GUSTAVO Giraldo

## 2023-09-13 NOTE — H&P
[facility] Medicine Services  HISTORY AND PHYSICAL    Patient Name: Angela Warren  : 1950  MRN: [id number]  Primary Care Physician: Rosalba Howard APRN  Date of admission: 2023      Subjective   Subjective     Chief Complaint:  SOA    HPI:  Angela Diaz Parent is a 72 y.o. female prior smoker (15 pack year history, quit in 2023) with PMH of HTN, MS, depression, Hep C  and COPD not on supplemental O2 who presented with several weeks of progressive SOA and cough productive of yellow sputum. Pt states that she had been doing okay until a few weeks ago when she noticed that she was having more trouble breathing and her cough became more frequent. She notes increase in productivity of cough now with thick, yellow sputum. She endorses a low grade temp of 100F at home over the last few days.  She denies any sick contacts and her son, with whom she lives, has not been sick as he is currently receiving treatment for bladder cancer.         Personal History     Past Medical History:   Diagnosis Date    Acid reflux     Anxiety     Anxiety     Arthritis     Cataract     CKD (chronic kidney disease) stage 2, GFR 60-89 ml/min 2018    Colon polyp     Depression     GERD (gastroesophageal reflux disease)     Headache     Heart murmur     History of blood transfusion     after surgery     Hypertension 2018    Infectious viral hepatitis     Mild aortic regurgitation 2019    MS (multiple sclerosis)     Pneumonia     Prediabetes 2019             Past Surgical History:   Procedure Laterality Date    ANKLE OPEN REDUCTION INTERNAL FIXATION Left 2019    Procedure: ANKLE OPEN REDUCTION INTERNAL FIXATION;  Surgeon: Stan Lozoya MD;  [facility];  Service: Orthopedics    CATARACT EXTRACTION      CERVICAL BIOPSY      benign    COLONOSCOPY      HEMORRHOIDECTOMY      TUBAL ABDOMINAL LIGATION         Family History: family history  includes Hyperlipidemia in her sister; Hypertension in her brother; Migraines in her brother; Other in her brother, niece, and sister; Thyroid disease in her mother and sister.     Social History:  reports that she has been smoking cigarettes. She started smoking about 45 years ago. She has a 5.00 pack-year smoking history. She has never used smokeless tobacco. She reports that she does not drink alcohol and does not use drugs.  Social History     Social History Narrative    Retired form [employer] industry.        Medications:  Available home medication information reviewed.  (Not in a hospital admission)      Allergies   Allergen Reactions    Penicillins Anaphylaxis    Lisinopril Rash and Cough       Objective   Objective     Vital Signs:   Temp:  [98.1 °F (36.7 °C)] 98.1 °F (36.7 °C)  Heart Rate:  [81-96] 81  Resp:  [20-22] 22  BP: (179-188)/() 188/84  Flow (L/min):  [2] 2       Physical Exam   Constitutional: Awake, alert, cachetic appearing female  Eyes: PERRLA, sclerae anicteric, no conjunctival injection  HENT: NCAT, mucous membranes dry   Neck: Supple, no thyromegaly, no lymphadenopathy, trachea midline  Respiratory: mildly dyspneic on 2L BNC, wheezing diffusely bilaterally  Cardiovascular: RRR, no murmurs, rubs, or gallops, palpable pedal pulses bilaterally  Gastrointestinal: Positive bowel sounds, soft, nontender, nondistended  Musculoskeletal: No bilateral ankle edema, no clubbing or cyanosis to extremities  Psychiatric: Appropriate affect, cooperative  Neurologic: Oriented x 3, strength symmetric in all extremities, Cranial Nerves grossly intact to confrontation, speech clear  Skin: No rashes   Result Review:  I have personally reviewed the results from the time of this admission to 9/13/2023 16:43 EDT and agree with these findings:  [x]  Laboratory list / accordion  [x]  Microbiology  [x]  Radiology  []  EKG/Telemetry   []  Cardiology/Vascular   []  Pathology  [x]  Old records  []  Other:  Most  notable findings include: see assessment and plan         LAB RESULTS:      Lab 09/13/23  1232   WBC 19.20*   HEMOGLOBIN 12.9   HEMATOCRIT 37.8   PLATELETS 555*   NEUTROS ABS 15.58*   IMMATURE GRANS (ABS) 0.30*   LYMPHS ABS 1.70   MONOS ABS 1.55*   EOS ABS 0.03   MCV 93.8   PROCALCITONIN 0.22   LACTATE 1.4         Lab 09/13/23  1232   SODIUM 125*   POTASSIUM 4.7   CHLORIDE 86*   CO2 28.0   ANION GAP 11.0   BUN 10   CREATININE 0.57   EGFR 95.5   GLUCOSE 120*   CALCIUM 9.4         Lab 09/13/23  1232   TOTAL PROTEIN 7.3   ALBUMIN 3.8   GLOBULIN 3.5   ALT (SGPT) 15   AST (SGOT) 25   BILIRUBIN 0.3   ALK PHOS 148*         Lab 09/13/23  1232   PROBNP 565.0   HSTROP T 21*                 UA          8/24/2023    16:02   Urinalysis   Squamous Epithelial Cells, UA 3-6    Specific Gravity, UA 1.021    Ketones, UA Negative    Blood, UA Negative    Leukocytes, UA Negative    Nitrite, UA Negative    RBC, UA 0-2    WBC, UA 0-2    Bacteria, UA 1+        Microbiology Results (last 10 days)       Procedure Component Value - Date/Time    COVID PRE-OP / PRE-PROCEDURE SCREENING ORDER (NO ISOLATION) - Swab, Nasopharynx [[id number]]  (Normal) Collected: 09/13/23 1314    Lab Status: Final result Specimen: Swab from Nasopharynx Updated: 09/13/23 1417    Narrative:      The following orders were created for panel order COVID PRE-OP / PRE-PROCEDURE SCREENING ORDER (NO ISOLATION) - Swab, Nasopharynx.  Procedure                               Abnormality         Status                     ---------                               -----------         ------                     COVID-19 and FLU A/B PCR...[[id number]]  Normal              Final result                 Please view results for these tests on the individual orders.    COVID-19 and FLU A/B PCR - Swab, Nasopharynx [[id number]]  (Normal) Collected: 09/13/23 1314    Lab Status: Final result Specimen: Swab from Nasopharynx Updated: 09/13/23 1417     COVID19 Not Detected     Influenza A PCR Not  Detected     Influenza B PCR Not Detected    Narrative:      Fact sheet for providers: https://www.fda.gov/media/952577/download    Fact sheet for patients: https://www.fda.gov/media/621797/download    Test performed by PCR.            XR Chest 1 View    Result Date: 9/13/2023  XR CHEST 1 VW Date of Exam: 9/13/2023 12:40 PM EDT Indication: SOA triage protocol Comparison: 3/2/2022 Findings: There are no airspace consolidations. There is slight hyperinflation with emphysema. No pleural fluid. No pneumothorax. The pulmonary vasculature appears within normal limits. The cardiac and mediastinal silhouette appear unremarkable. No acute osseous abnormality identified.     Impression: Impression: No acute pulmonary process COPD/emphysema Electronically Signed: Lawrence Ocampo MD  9/13/2023 12:51 PM EDT  Workstation ID: [id number]     Results for orders placed during the hospital encounter of 01/03/19    Adult Transthoracic Echo Complete W/ Cont if Necessary Per Protocol    Interpretation Summary  · Left ventricular systolic function is normal.  · Estimated EF appears to be in the range of 61 - 65%.  · Mild aortic valve regurgitation is present.      Assessment & Plan   Assessment & Plan     Active Hospital Problems    Diagnosis  POA    **COPD with acute exacerbation [J44.1]  Yes     Priority: High    Leukocytosis [D72.829]  Unknown     Priority: High    Hyponatremia [E87.1]  Unknown     Priority: Medium    History of tobacco use [Z87.891]  Not Applicable    Hepatitis C antibody test positive [R76.8]  Yes    MINDI (generalized anxiety disorder) [F41.1]  Yes    Chronic constipation [K59.09]  Yes    MS (multiple sclerosis) [G35]  Yes    Essential hypertension [I10]  Yes       Ms. Parent is a 73 yo WF prior smoker (15 pack year history, quit in August 2023) with PMH of HTN, MS, depression, Hep C  and COPD not on supplemental O2 who presented with acute COPD exacerbation.    Plan:     Acute COPD exacerbation  -- not on supplemental  O2 at baseline, currently requiring 2L BNC  -- CXR with no acute process  -- leukocytosis with L shift and low grade fever at home.  Procal wnl  -- check full respiratory viral panel, COVID and flu were negative on admission  -- scheduled and PRN duonebs  -- Doxycycline and IV Solumedrol for now  -- antitussives  -- wean supplemental O2 as tolerated    Hyponatremia  -- Na+ of 125, likely hypovolemic hyponatremia in setting of poor oral intake over the last few days  -- IVFs for 24 hours, repeat labs in am  -- appears it has been low in the past, most recent labs it was 130s, but has been 128 in the last year or so    MS  -- follows with Dr. Petit  -- continue home meds  -- uses walker, cane and wheelchair intermittently at home  -- PT/OT consults    HTN  -- continue home meds    Chronic Constipation  --continue Miralax    H/o Hep C ab+    H/o Tobacco Abuse  -- smoked 1/2 ppd for 30 years and quit last month  -- encouraged continued cessation     Total time spent: 45 minutes  Time spent includes time reviewing chart, face-to-face time, counseling patient/family/caregiver, ordering medications/tests/procedures, communicating with other health care professionals, documenting clinical information in the electronic health record, and coordination of care.      DVT prophylaxis:  DALE      CODE STATUS:  Full Code  Code Status and Medical Interventions:   Ordered at: 09/13/23 1621     Level Of Support Discussed With:    Patient     Code Status (Patient has no pulse and is not breathing):    CPR (Attempt to Resuscitate)     Medical Interventions (Patient has pulse or is breathing):    Full Support       Expected Discharge   Expected Discharge Date: 9/15/2023; Expected Discharge Time:      Demetria Craig MD  09/13/23

## 2023-09-13 NOTE — ED PROVIDER NOTES
[place]    EMERGENCY DEPARTMENT ENCOUNTER      Pt Name: Angela Warren  MRN: [id number]  YOB: 1950  Date of evaluation: 9/13/2023  Provider: Andry Cox DO    CHIEF COMPLAINT       Chief Complaint   Patient presents with    Shortness of Breath         HISTORY OF PRESENT ILLNESS  (Location/Symptom, Timing/Onset, Context/Setting, Quality, Duration, Modifying Factors, Severity.)   Angela Warren is a 72 y.o. female who presents to the emergency department via EMS for evaluation of shortness of breath, cough congestion sputum production which has been progressively worsening over the last couple weeks.  She has had some yellow dark-tinged sputum production, intermittent fever, chills, generalized weakness, just overall not feeling well.  Has a history of chronic COPD, does not wear supplemental oxygen at home.  Does not wear a CPAP machine in the evenings.  She was told she likely does need supplemental oxygen but she does not have this currently.  She been trying her inhalers every few hours limited relief.  Positive for significant COPD history/emphysema, prior history of smoking, no longer currently smoking.  She denies any chest pain.  She has had decreased appetite over the last couple weeks as well.  No peripheral edema.  She denies any other acute systemic complaints at this time.      Nursing notes were reviewed.      PAST MEDICAL HISTORY     Past Medical History:   Diagnosis Date    Acid reflux     Anxiety     Anxiety     Arthritis     Cataract     CKD (chronic kidney disease) stage 2, GFR 60-89 ml/min 2018    Colon polyp     Depression     GERD (gastroesophageal reflux disease)     Headache     Heart murmur     History of blood transfusion 1971    after surgery     Hypertension 2018    Infectious viral hepatitis     Mild aortic regurgitation 01/03/2019    MS (multiple sclerosis)     Pneumonia     Prediabetes 12/12/2019         SURGICAL HISTORY       Past Surgical History:    Procedure Laterality Date    ANKLE OPEN REDUCTION INTERNAL FIXATION Left 12/28/2019    Procedure: ANKLE OPEN REDUCTION INTERNAL FIXATION;  Surgeon: Stan Lozoya MD;  Location: [facility];  Service: Orthopedics    CATARACT EXTRACTION  2009    CERVICAL BIOPSY  1971    benign    COLONOSCOPY      HEMORRHOIDECTOMY  1971    TUBAL ABDOMINAL LIGATION  1985         CURRENT MEDICATIONS       Current Facility-Administered Medications:     sodium chloride 0.9 % flush 10 mL, 10 mL, Intravenous, PRN, Andry Cox,     Current Outpatient Medications:     albuterol sulfate  (90 Base) MCG/ACT inhaler, Inhale 2 puffs Every 4 (Four) Hours As Needed for Wheezing., Disp: 18 g, Rfl: 6    HYDROcodone-acetaminophen (NORCO)  MG per tablet, Take 1 tablet by mouth Every 6 (Six) Hours As Needed., Disp: , Rfl:     mirtazapine (REMERON) 30 MG tablet, Take 1 tablet by mouth every night at bedtime., Disp: 90 tablet, Rfl: 3    OXcarbazepine (TRILEPTAL) 300 MG tablet, TAKE 1 TABLET BY MOUTH TWICE A DAY, Disp: 180 tablet, Rfl: 1    polyethylene glycol (MIRALAX) powder, Take 17 g by mouth Daily. (Patient taking differently: Take 17 g by mouth Daily. OTC), Disp: 1 each, Rfl: 11    QUEtiapine (SEROquel) 100 MG tablet, TAKE 1 TABLET BY MOUTH EVERY DAY AT NIGHT, Disp: 90 tablet, Rfl: 3    tiZANidine (ZANAFLEX) 4 MG tablet, Take 1 tablet by mouth 2 (Two) Times a Day., Disp: 180 tablet, Rfl: 3    vitamin B-12 (CYANOCOBALAMIN) 500 MCG tablet, Take 1 tablet by mouth Daily. OTC, Disp: , Rfl:     Zinc 100 MG tablet, Take 1 tablet by mouth Daily. OTC, Disp: , Rfl:     ALLERGIES     Penicillins and Lisinopril    FAMILY HISTORY       Family History   Problem Relation Age of Onset    Thyroid disease Mother     Hyperlipidemia Sister     Thyroid disease Sister     Other Sister         parathyroid disease    Hypertension Brother     Migraines Brother     Other Brother         kidney stones    Other Niece         parathyroid disease     Breast cancer Neg Hx     Ovarian cancer Neg Hx     Colon cancer Neg Hx     Colon polyps Neg Hx     Esophageal cancer Neg Hx           SOCIAL HISTORY       Social History     Socioeconomic History    Marital status:    Tobacco Use    Smoking status: Light Smoker     Packs/day: 0.50     Years: 10.00     Pack years: 5.00     Types: Cigarettes     Start date: 1978    Smokeless tobacco: Never   Vaping Use    Vaping Use: Never used   Substance and Sexual Activity    Alcohol use: No    Drug use: No    Sexual activity: Defer         PHYSICAL EXAM    (up to 7 for level 4, 8 or more for level 5)     Vitals:    09/13/23 1229 09/13/23 1300 09/13/23 1328 09/13/23 1329   BP: (!) 186/108 (!) 185/108 (!) 179/105 (!) 179/105   BP Location: Left arm      Patient Position: Lying      Pulse: 90 92 92 96   Resp: 20  22    Temp:       TempSrc:       SpO2: 97% 90% 90% 92%   Weight:       Height:           Physical Exam  General : Patient is pleasant, elderly, frail, awake, alert, oriented, in no acute distress, nontoxic appearing  HEENT: Pupils are equally round, EOMI, conjunctivae clear, there is no injection no icterus.  Oral mucosa is moist, uvula midline  Neck: Neck is supple, full range of motion, trachea midline  Cardiac: Heart regular rate, rhythm, no murmurs, rubs, or gallops  Lungs: Lungs with decreased breath sounds bilaterally, scattered rhonchi with wheezes noted in the left lung fields, ox saturations 89% on room air.  Abdomen: Abdomen is soft, nontender, nondistended. There are no firm or pulsatile masses, no rebound rigidity or guarding  Musculoskeletal: Generalized muscle atrophy.  No focal muscle deficits are appreciated  Neuro: Motor intact, sensory intact, level of consciousness is normal  Dermatology: Skin is warm and dry  Psych: Mentation is grossly normal, cognition is grossly normal. Affect is appropriate      DIAGNOSTIC RESULTS     EKG:  All EKGs are interpreted by the Emergency Department Physician who  either signs or Co-signs this chart in the absence of a cardiologist.    ECG 12 Lead ED Triage Standing Order; SOA   Final Result   Test Reason : ED Triage Standing Order~   Blood Pressure :   */*   mmHG   Vent. Rate :  99 BPM     Atrial Rate :  99 BPM      P-R Int : 142 ms          QRS Dur :  78 ms       QT Int : 332 ms       P-R-T Axes :  85 -70  78 degrees      QTc Int : 426 ms         Normal sinus rhythm   Biatrial enlargement   Left axis deviation   Pulmonary disease pattern   Possible Inferior infarct , age undetermined   Abnormal ECG   When compared with ECG of 02-MAR-2022 14:02,   no sig changes   Confirmed by DARLINE TAPIA MD ([id number]) on 9/13/2023 1:09:26 PM      Referred By: ED MD           Confirmed By: DARLINE TAPIA MD          RADIOLOGY:     [] Radiologist's Report Reviewed:  XR Chest 1 View   Final Result   Impression:   No acute pulmonary process      COPD/emphysema         Electronically Signed: Lawrence Ocampo MD     9/13/2023 12:51 PM EDT     Workstation ID: [id number]          I ordered and independently reviewed the above noted radiographic studies.      I viewed images of chest x-ray which showed COPD/emphysema per my independent interpretation.    See radiologist's dictation for official interpretation.      ED BEDSIDE ULTRASOUND:   Performed by ED Physician - none    LABS:    I have reviewed and interpreted all of the currently available lab results from this visit (if applicable):  Results for orders placed or performed during the hospital encounter of 09/13/23   COVID-19 and FLU A/B PCR - Swab, Nasopharynx    Specimen: Nasopharynx; Swab   Result Value Ref Range    COVID19 Not Detected Not Detected - Ref. Range    Influenza A PCR Not Detected Not Detected    Influenza B PCR Not Detected Not Detected   Comprehensive Metabolic Panel    Specimen: Blood   Result Value Ref Range    Glucose 120 (H) 65 - 99 mg/dL    BUN 10 8 - 23 mg/dL    Creatinine 0.57 0.57 - 1.00 mg/dL    Sodium 125 (L) 136 - 145  mmol/L    Potassium 4.7 3.5 - 5.2 mmol/L    Chloride 86 (L) 98 - 107 mmol/L    CO2 28.0 22.0 - 29.0 mmol/L    Calcium 9.4 8.6 - 10.5 mg/dL    Total Protein 7.3 6.0 - 8.5 g/dL    Albumin 3.8 3.5 - 5.2 g/dL    ALT (SGPT) 15 1 - 33 U/L    AST (SGOT) 25 1 - 32 U/L    Alkaline Phosphatase 148 (H) 39 - 117 U/L    Total Bilirubin 0.3 0.0 - 1.2 mg/dL    Globulin 3.5 gm/dL    A/G Ratio 1.1 g/dL    BUN/Creatinine Ratio 17.5 7.0 - 25.0    Anion Gap 11.0 5.0 - 15.0 mmol/L    eGFR 95.5 >60.0 mL/min/1.73   BNP    Specimen: Blood   Result Value Ref Range    proBNP 565.0 0.0 - 900.0 pg/mL   Single High Sensitivity Troponin T    Specimen: Blood   Result Value Ref Range    HS Troponin T 21 (H) <10 ng/L   CBC Auto Differential    Specimen: Blood   Result Value Ref Range    WBC 19.20 (H) 3.40 - 10.80 10*3/mm3    RBC 4.03 3.77 - 5.28 10*6/mm3    Hemoglobin 12.9 12.0 - 15.9 g/dL    Hematocrit 37.8 34.0 - 46.6 %    MCV 93.8 79.0 - 97.0 fL    MCH 32.0 26.6 - 33.0 pg    MCHC 34.1 31.5 - 35.7 g/dL    RDW 13.3 12.3 - 15.4 %    RDW-SD 45.5 37.0 - 54.0 fl    MPV 8.5 6.0 - 12.0 fL    Platelets 555 (H) 140 - 450 10*3/mm3    Neutrophil % 81.0 (H) 42.7 - 76.0 %    Lymphocyte % 8.9 (L) 19.6 - 45.3 %    Monocyte % 8.1 5.0 - 12.0 %    Eosinophil % 0.2 (L) 0.3 - 6.2 %    Basophil % 0.2 0.0 - 1.5 %    Immature Grans % 1.6 (H) 0.0 - 0.5 %    Neutrophils, Absolute 15.58 (H) 1.70 - 7.00 10*3/mm3    Lymphocytes, Absolute 1.70 0.70 - 3.10 10*3/mm3    Monocytes, Absolute 1.55 (H) 0.10 - 0.90 10*3/mm3    Eosinophils, Absolute 0.03 0.00 - 0.40 10*3/mm3    Basophils, Absolute 0.04 0.00 - 0.20 10*3/mm3    Immature Grans, Absolute 0.30 (H) 0.00 - 0.05 10*3/mm3    nRBC 0.0 0.0 - 0.2 /100 WBC   Lactic Acid, Plasma    Specimen: Blood   Result Value Ref Range    Lactate 1.4 0.5 - 2.0 mmol/L   Procalcitonin    Specimen: Blood   Result Value Ref Range    Procalcitonin 0.22 0.00 - 0.25 ng/mL   ECG 12 Lead ED Triage Standing Order; SOA   Result Value Ref Range    QT  Interval 332 ms    QTC Interval 426 ms   Green Top (Gel)   Result Value Ref Range    Extra Tube Hold for add-ons.    Lavender Top   Result Value Ref Range    Extra Tube hold for add-on    Gold Top - SST   Result Value Ref Range    Extra Tube Hold for add-ons.    Light Blue Top   Result Value Ref Range    Extra Tube Hold for add-ons.         If labs were ordered, I independently reviewed the results and considered them in treating the patient.      EMERGENCY DEPARTMENT COURSE and DIFFERENTIAL DIAGNOSIS/MDM:   Vitals:  AS OF 15:21 EDT    BP - (!) 179/105  HR - 96  TEMP - 98.1 °F (36.7 °C) (Oral)  O2 SATS - 92%      Orders placed during this visit:  Orders Placed This Encounter   Procedures    COVID PRE-OP / PRE-PROCEDURE SCREENING ORDER (NO ISOLATION) - Swab, Nasopharynx    Blood Culture - Blood,    Blood Culture - Blood,    COVID-19 and FLU A/B PCR - Swab, Nasopharynx    XR Chest 1 View    [place] Draw    Comprehensive Metabolic Panel    BNP    Single High Sensitivity Troponin T    CBC Auto Differential    Lactic Acid, Plasma    Procalcitonin    NPO Diet NPO Type: Strict NPO    Undress & Gown    Continuous Pulse Oximetry    Vital Signs    Oxygen Therapy- Nasal Cannula; Titrate 1-6 LPM Per SpO2; 90 - 95%    ECG 12 Lead ED Triage Standing Order; SOA    Insert Peripheral IV    Inpatient Admission    CBC & Differential    Green Top (Gel)    Lavender Top    Gold Top - SST    Gray Top    Light Blue Top       All labs have been independently reviewed by me.  All radiology studies have been reviewed by me and the radiologist dictating the report.  All EKG's have been independently viewed and interpreted by me.      Discussion below represents my analysis of pertinent findings related to patient's condition, differential diagnosis, treatment plan and final disposition.    Differential diagnosis:  The differential diagnosis associated with the patient's presentation includes: COPD exacerbation, pneumonia, respiratory  failure/hypoxia    Additional sources  Discussed/ obtained information from independent historians:   [] Spouse  [] Parent  [x] Family member  [] Friend  [] EMS   [] Other:    External (non-ED) record review:   [] Inpatient record:   [] Office record:   [] Outpatient record:   [] Prior Outpatient labs:   [] Prior Outpatient radiology:   [] Primary Care record:   [] Outside ED record:   [] Other:     Patient's care impacted by:   [] Diabetes  [] Hypertension  [] CHF  [] Hyperlipidemia  [] Coronary Artery Disease   [x] COPD   [] Cancer   [] Tobacco Abuse   [] Substance Abuse    [] Other:     Care significantly affected by Social Determinants of Health (housing and economic circumstances, unemployment)    [] Yes     [x] No   If yes, Patient's care significantly limited by Social Determinants of Health including:   [] Inadequate housing   [] Low income   [] Alcoholism and drug addiction in family   [] Problems related to primary support group   [] Unemployment   [] Problems related to employment   [] Other Social Determinants of Health:       MEDICATIONS ADMINISTERED IN ED:  Medications   sodium chloride 0.9 % flush 10 mL (has no administration in time range)   ipratropium-albuterol (DUO-NEB) nebulizer solution 3 mL (3 mL Nebulization Given 9/13/23 1328)   methylPREDNISolone sodium succinate (SOLU-Medrol) injection 125 mg (125 mg Intravenous Given 9/13/23 1316)   doxycycline (VIBRAMYCIN) 100 mg in sodium chloride 0.9 % 100 mL IVPB-VTB (100 mg Intravenous New Bag 9/13/23 1316)              Is a 72-year-old female with a prior history of COPD/emphysema who presents with 2 weeks of shortness of breath cough congestion sputum production, intermittent fever, just overall not doing well.  Oxygen saturation's are in the 80s upon arrival, will place her on 2 L nasal cannula she does not wear supplemental oxygen at home.  Currently afebrile, IV, labs and imaging obtained.  Chest x-ray with COPD/emphysematous changes, no obvious  pneumonia.  She has had fever, cough sputum production, is a white count of 19,000 with left shift.  We will cover her for underlying pneumonia with antibiotics.  Patient negative for COVID/influenza, lactic acid is normal.  She does have sodium of 125 history of chronic hyponatremia.  We will plan on treatment for COPD exacerbation, pneumonia, plan for admission to the hospital secondary to acute respiratory failure with hypoxia for further work-up treatment and assessment.  Case discussed with hospitalist, Dr. Walls, for admission.    PROCEDURES:  Procedures    CRITICAL CARE TIME    Total Critical Care time was 30 minutes, excluding separately reportable procedures.  Acute respiratory failure hypoxia COPD exacerbation requiring multiple interventions, reassessments, oxygen ministration, discussions with consultants. There was a high probability of clinically significant/life threatening deterioration in the patient's condition which required my urgent intervention.      FINAL IMPRESSION      1. Acute on chronic respiratory failure with hypoxia    2. COPD exacerbation    3. Hyponatremia    4. Bandemia          DISPOSITION/PLAN     ED Disposition       ED Disposition   Decision to Admit    Condition   --    Comment   Level of Care: Telemetry [5]   Diagnosis: COPD with acute exacerbation [568751]   Admitting Physician: SUKH WALLS [[id number]]   Attending Physician: SUKH WALLS [[id number]]   Certification: I Certify That Inpatient Hospital Services Are Medically Necessary For Greater Than 2 Midnights                   Comment: Please note this report has been produced using speech recognition software.      Andry Cox DO  Attending Emergency Physician         Andry Cox DO  09/13/23 1521

## 2023-09-14 LAB
ANION GAP SERPL CALCULATED.3IONS-SCNC: 12 MMOL/L (ref 5–15)
BUN SERPL-MCNC: 13 MG/DL (ref 8–23)
BUN/CREAT SERPL: 23.2 (ref 7–25)
CALCIUM SPEC-SCNC: 9.2 MG/DL (ref 8.6–10.5)
CHLORIDE SERPL-SCNC: 90 MMOL/L (ref 98–107)
CHOLEST SERPL-MCNC: 152 MG/DL (ref 0–200)
CO2 SERPL-SCNC: 25 MMOL/L (ref 22–29)
CREAT SERPL-MCNC: 0.56 MG/DL (ref 0.57–1)
DEPRECATED RDW RBC AUTO: 45.1 FL (ref 37–54)
EGFRCR SERPLBLD CKD-EPI 2021: 95.9 ML/MIN/1.73
ERYTHROCYTE [DISTWIDTH] IN BLOOD BY AUTOMATED COUNT: 13.2 % (ref 12.3–15.4)
GLUCOSE SERPL-MCNC: 99 MG/DL (ref 65–99)
HBA1C MFR BLD: 5.7 % (ref 4.8–5.6)
HCT VFR BLD AUTO: 32.7 % (ref 34–46.6)
HDLC SERPL-MCNC: 45 MG/DL (ref 40–60)
HGB BLD-MCNC: 11.4 G/DL (ref 12–15.9)
L PNEUMO1 AG UR QL IA: NEGATIVE
LDLC SERPL CALC-MCNC: 92 MG/DL (ref 0–100)
LDLC/HDLC SERPL: 2.04 {RATIO}
MCH RBC QN AUTO: 32.5 PG (ref 26.6–33)
MCHC RBC AUTO-ENTMCNC: 34.9 G/DL (ref 31.5–35.7)
MCV RBC AUTO: 93.2 FL (ref 79–97)
MRSA DNA SPEC QL NAA+PROBE: NEGATIVE
PLATELET # BLD AUTO: 482 10*3/MM3 (ref 140–450)
PMV BLD AUTO: 8.8 FL (ref 6–12)
POTASSIUM SERPL-SCNC: 4.8 MMOL/L (ref 3.5–5.2)
QT INTERVAL: 348 MS
QTC INTERVAL: 418 MS
RBC # BLD AUTO: 3.51 10*6/MM3 (ref 3.77–5.28)
S PNEUM AG SPEC QL LA: NEGATIVE
SODIUM SERPL-SCNC: 127 MMOL/L (ref 136–145)
TRIGL SERPL-MCNC: 76 MG/DL (ref 0–150)
TSH SERPL DL<=0.05 MIU/L-ACNC: 0.67 UIU/ML (ref 0.27–4.2)
VLDLC SERPL-MCNC: 15 MG/DL (ref 5–40)
WBC NRBC COR # BLD: 15.38 10*3/MM3 (ref 3.4–10.8)

## 2023-09-14 PROCEDURE — 80061 LIPID PANEL: CPT | Performed by: INTERNAL MEDICINE

## 2023-09-14 PROCEDURE — 94799 UNLISTED PULMONARY SVC/PX: CPT

## 2023-09-14 PROCEDURE — 85027 COMPLETE CBC AUTOMATED: CPT | Performed by: INTERNAL MEDICINE

## 2023-09-14 PROCEDURE — 25010000002 HYDRALAZINE PER 20 MG: Performed by: INTERNAL MEDICINE

## 2023-09-14 PROCEDURE — 94664 DEMO&/EVAL PT USE INHALER: CPT

## 2023-09-14 PROCEDURE — 83036 HEMOGLOBIN GLYCOSYLATED A1C: CPT | Performed by: INTERNAL MEDICINE

## 2023-09-14 PROCEDURE — 84443 ASSAY THYROID STIM HORMONE: CPT | Performed by: INTERNAL MEDICINE

## 2023-09-14 PROCEDURE — 93005 ELECTROCARDIOGRAM TRACING: CPT | Performed by: INTERNAL MEDICINE

## 2023-09-14 PROCEDURE — 87641 MR-STAPH DNA AMP PROBE: CPT | Performed by: INTERNAL MEDICINE

## 2023-09-14 PROCEDURE — 97166 OT EVAL MOD COMPLEX 45 MIN: CPT

## 2023-09-14 PROCEDURE — 99232 SBSQ HOSP IP/OBS MODERATE 35: CPT | Performed by: INTERNAL MEDICINE

## 2023-09-14 PROCEDURE — 25010000002 HEPARIN (PORCINE) PER 1000 UNITS: Performed by: INTERNAL MEDICINE

## 2023-09-14 PROCEDURE — 97535 SELF CARE MNGMENT TRAINING: CPT

## 2023-09-14 PROCEDURE — 97530 THERAPEUTIC ACTIVITIES: CPT

## 2023-09-14 PROCEDURE — 93010 ELECTROCARDIOGRAM REPORT: CPT | Performed by: INTERNAL MEDICINE

## 2023-09-14 PROCEDURE — 80048 BASIC METABOLIC PNL TOTAL CA: CPT | Performed by: INTERNAL MEDICINE

## 2023-09-14 PROCEDURE — 97162 PT EVAL MOD COMPLEX 30 MIN: CPT

## 2023-09-14 PROCEDURE — 25010000002 METHYLPREDNISOLONE PER 125 MG: Performed by: INTERNAL MEDICINE

## 2023-09-14 RX ORDER — SODIUM CHLORIDE 9 MG/ML
50 INJECTION, SOLUTION INTRAVENOUS CONTINUOUS
Status: DISCONTINUED | OUTPATIENT
Start: 2023-09-14 | End: 2023-09-15

## 2023-09-14 RX ORDER — HYDRALAZINE HYDROCHLORIDE 20 MG/ML
10 INJECTION INTRAMUSCULAR; INTRAVENOUS EVERY 6 HOURS PRN
Status: DISCONTINUED | OUTPATIENT
Start: 2023-09-14 | End: 2023-09-20 | Stop reason: HOSPADM

## 2023-09-14 RX ORDER — AMLODIPINE BESYLATE 5 MG/1
5 TABLET ORAL
Status: DISCONTINUED | OUTPATIENT
Start: 2023-09-14 | End: 2023-09-15

## 2023-09-14 RX ADMIN — BENZONATATE 200 MG: 100 CAPSULE ORAL at 05:33

## 2023-09-14 RX ADMIN — METHYLPREDNISOLONE SODIUM SUCCINATE 62.5 MG: 125 INJECTION, POWDER, FOR SOLUTION INTRAMUSCULAR; INTRAVENOUS at 08:47

## 2023-09-14 RX ADMIN — HEPARIN SODIUM 5000 UNITS: 5000 INJECTION, SOLUTION INTRAVENOUS; SUBCUTANEOUS at 20:23

## 2023-09-14 RX ADMIN — HYDRALAZINE HYDROCHLORIDE 10 MG: 20 INJECTION INTRAMUSCULAR; INTRAVENOUS at 11:05

## 2023-09-14 RX ADMIN — IPRATROPIUM BROMIDE AND ALBUTEROL SULFATE 3 ML: 2.5; .5 SOLUTION RESPIRATORY (INHALATION) at 07:30

## 2023-09-14 RX ADMIN — HYDRALAZINE HYDROCHLORIDE 10 MG: 20 INJECTION INTRAMUSCULAR; INTRAVENOUS at 18:42

## 2023-09-14 RX ADMIN — TIZANIDINE 4 MG: 4 TABLET ORAL at 08:46

## 2023-09-14 RX ADMIN — SODIUM CHLORIDE 100 ML/HR: 9 INJECTION, SOLUTION INTRAVENOUS at 05:29

## 2023-09-14 RX ADMIN — OXCARBAZEPINE 300 MG: 150 TABLET, FILM COATED ORAL at 22:46

## 2023-09-14 RX ADMIN — Medication 10 ML: at 22:47

## 2023-09-14 RX ADMIN — TIZANIDINE 4 MG: 4 TABLET ORAL at 20:23

## 2023-09-14 RX ADMIN — IPRATROPIUM BROMIDE AND ALBUTEROL SULFATE 3 ML: 2.5; .5 SOLUTION RESPIRATORY (INHALATION) at 19:13

## 2023-09-14 RX ADMIN — HEPARIN SODIUM 5000 UNITS: 5000 INJECTION, SOLUTION INTRAVENOUS; SUBCUTANEOUS at 05:15

## 2023-09-14 RX ADMIN — AMLODIPINE BESYLATE 5 MG: 5 TABLET ORAL at 11:04

## 2023-09-14 RX ADMIN — DOXYCYCLINE 100 MG: 100 CAPSULE ORAL at 20:23

## 2023-09-14 RX ADMIN — HYDROCODONE BITARTRATE AND ACETAMINOPHEN 1 TABLET: 10; 325 TABLET ORAL at 20:24

## 2023-09-14 RX ADMIN — OXCARBAZEPINE 300 MG: 150 TABLET, FILM COATED ORAL at 08:57

## 2023-09-14 RX ADMIN — DOXYCYCLINE 100 MG: 100 CAPSULE ORAL at 08:47

## 2023-09-14 RX ADMIN — MIRTAZAPINE 30 MG: 15 TABLET, FILM COATED ORAL at 20:23

## 2023-09-14 RX ADMIN — BENZONATATE 200 MG: 100 CAPSULE ORAL at 20:23

## 2023-09-14 RX ADMIN — CYANOCOBALAMIN TAB 1000 MCG 500 MCG: 1000 TAB at 08:46

## 2023-09-14 RX ADMIN — HYDROCODONE BITARTRATE AND ACETAMINOPHEN 1 TABLET: 10; 325 TABLET ORAL at 05:33

## 2023-09-14 RX ADMIN — HEPARIN SODIUM 5000 UNITS: 5000 INJECTION, SOLUTION INTRAVENOUS; SUBCUTANEOUS at 13:46

## 2023-09-14 RX ADMIN — QUETIAPINE FUMARATE 100 MG: 100 TABLET ORAL at 20:23

## 2023-09-14 RX ADMIN — IPRATROPIUM BROMIDE AND ALBUTEROL SULFATE 3 ML: 2.5; .5 SOLUTION RESPIRATORY (INHALATION) at 01:19

## 2023-09-14 RX ADMIN — IPRATROPIUM BROMIDE AND ALBUTEROL SULFATE 3 ML: 2.5; .5 SOLUTION RESPIRATORY (INHALATION) at 12:01

## 2023-09-14 NOTE — CONSULTS
COPD Nurse Navigator has received consult and completed chart review.  Two attempts were made to see the patient this date with no success.  At first attempt patient was working with therapy services and at second attempt patient stated that she had a rough morning.  Interview deferred this date.  No other questions or concerns at this time.  NN continues to follow.

## 2023-09-14 NOTE — THERAPY EVALUATION
Patient Name: Angela Diaz Parent  : 1950    MRN: [id number]                              Today's Date: 2023       Admit Date: 2023    Visit Dx:     ICD-10-CM ICD-9-CM   1. Acute on chronic respiratory failure with hypoxia  J96.21 518.84     799.02   2. COPD exacerbation  J44.1 491.21   3. Hyponatremia  E87.1 276.1   4. Bandemia  D72.825 288.66     Patient Active Problem List   Diagnosis    Essential hypertension    Palpitations    Other forms of angina pectoris    CKD (chronic kidney disease) stage 2, GFR 60-89 ml/min    MS (multiple sclerosis)    Mild aortic regurgitation    Chronic constipation    MINDI (generalized anxiety disorder)    Polyp of colon    Hepatitis C antibody test positive    Neuropathic pain    Prediabetes    GERD without esophagitis    Hyperglycemia    Closed trimalleolar fracture, left, initial encounter    History of tobacco use    Weight loss    Chronic fatigue    Cough    Lower abdominal pain    Reactive depression    Medication monitoring encounter    Anxiety    Left lower quadrant abdominal pain    Impaired mobility    Bilious vomiting with nausea    COPD (chronic obstructive pulmonary disease)    Right upper quadrant pain    COPD with acute exacerbation    Hyponatremia    Leukocytosis    Acute-on-chronic respiratory failure     Past Medical History:   Diagnosis Date    Acid reflux     Anxiety     Anxiety     Arthritis     Cataract     CKD (chronic kidney disease) stage 2, GFR 60-89 ml/min 2018    Colon polyp     Depression     GERD (gastroesophageal reflux disease)     Headache     Heart murmur     History of blood transfusion 1971    after surgery     Hypertension 2018    Infectious viral hepatitis     Mild aortic regurgitation 2019    MS (multiple sclerosis)     Pneumonia     Prediabetes 2019     Past Surgical History:   Procedure Laterality Date    ANKLE OPEN REDUCTION INTERNAL FIXATION Left 2019    Procedure: ANKLE OPEN REDUCTION INTERNAL FIXATION;   Surgeon: Stan Lozoya MD;  Location: [facility];  Service: Orthopedics    CATARACT EXTRACTION  2009    CERVICAL BIOPSY  1971    benign    COLONOSCOPY      HEMORRHOIDECTOMY  1971    TUBAL ABDOMINAL LIGATION  1985      General Information       Row Name 09/14/23 1005          OT Time and Intention    Document Type evaluation  -     Mode of Treatment occupational therapy  -       Row Name 09/14/23 1005          General Information    Patient Profile Reviewed yes  -     Prior Level of Function independent:;bed mobility;transfer;all household mobility;ADL's;dependent:;cooking  -     Existing Precautions/Restrictions fall;oxygen therapy device and L/min  -     Barriers to Rehab medically complex;previous functional deficit  -       Row Name 09/14/23 1005          Living Environment    People in Home child(oscar), adult  son  -       Row Name 09/14/23 1005          Home Main Entrance    Number of Stairs, Main Entrance none  -       Row Name 09/14/23 1005          Stairs Within Home, Primary    Number of Stairs, Within Home, Primary none  -       Row Name 09/14/23 1005          Cognition    Orientation Status (Cognition) oriented x 3  -       Row Name 09/14/23 1005          Safety Issues, Functional Mobility    Safety Issues Affecting Function (Mobility) awareness of need for assistance;insight into deficits/self-awareness;safety precaution awareness;safety precautions follow-through/compliance;sequencing abilities  -     Impairments Affecting Function (Mobility) balance;endurance/activity tolerance;pain;shortness of breath;strength  -               User Key  (r) = Recorded By, (t) = Taken By, (c) = Cosigned By      Initials Name Provider Type     Candida Deutsch OT Occupational Therapist                     Mobility/ADL's       Row Name 09/14/23 1006          Bed Mobility    Bed Mobility supine-sit  -     Supine-Sit [place] (Bed Mobility) standby assist  -     Assistive  Device (Bed Mobility) bed rails;head of bed elevated  -     Comment, (Bed Mobility) Pt w/ noted O2 desat to 86% on 2LNC, on 3LNC pt's O2 sats hovering between 87-89%, on 4LNC O2 sats maintained 89-90%.  -       Row Name 09/14/23 1006          Transfers    Transfers sit-stand transfer;stand-sit transfer  -       Row Name 09/14/23 1006          Sit-Stand Transfer    Sit-Stand [place] (Transfers) minimum assist (75% patient effort);verbal cues  -     Assistive Device (Sit-Stand Transfers) --  UE support  -       Row Name 09/14/23 1006          Stand-Sit Transfer    Stand-Sit [place] (Transfers) minimum assist (75% patient effort);verbal cues  -[facility] Name 09/14/23 1006          Functional Mobility    Functional Mobility- Comment Deferred to PT  -[facility] Name 09/14/23 1006          Activities of Daily Living    BADL Assessment/Intervention lower body dressing;feeding;upper body dressing;toileting  -[facility] Name 09/14/23 [facility]6          Lower Body Dressing Assessment/Training    [place] Level (Lower Body Dressing) don;socks;doff;pants/bottoms;contact guard assist  -     Position (Lower Body Dressing) edge of bed sitting  -     Comment, (Lower Body Dressing) Pt w/ noted urine soaked pants/underwear upon entry d/t purwick malfunction, pt able perform LBD w/ CGA, however, multiple sitting rest breaks required d/t dyspnea.  -[facility] Name 09/14/23 1006          Self-Feeding Assessment/Training    [place] Level (Feeding) prepare tray/open items;liquids to mouth;scoop food and bring to mouth;set up  -     Position (Self-Feeding) edge of bed sitting  -       Row Name 09/14/23 1006          Upper Body Dressing Assessment/Training    [place] Level (Upper Body Dressing) don;doff;other (see comments);minimum assist (75% patient effort);verbal cues  -     Position (Upper Body Dressing) unsupported sitting  -     Comment, (Upper Body Dressing) min A d/t IV line  -        [facility] Name 09/14/23 1006          Toileting Assessment/Training    [place] Level (Toileting) adjust/manage clothing;perform perineal hygiene;contact guard assist  -     Position (Toileting) edge of bed sitting;supported standing  -               User Key  (r) = Recorded By, (t) = Taken By, (c) = Cosigned By      Initials Name Provider Type     Candida Deutsch OT Occupational Therapist                   Obj/Interventions       [facility] Name 09/14/23 1009          Sensory Assessment (Somatosensory)    Sensory Assessment (Somatosensory) UE sensation intact  -[facility] 09/14/23 1009          Vision Assessment/Intervention    Visual Impairment/Limitations WFL  -[facility] Name 09/14/23 1009          Range of Motion Comprehensive    General Range of Motion bilateral upper extremity ROM WFL  -[facility] 09/14/23 1009          Strength Comprehensive (MMT)    General Manual Muscle Testing (MMT) Assessment upper extremity strength deficits identified  -     Comment, General Manual Muscle Testing (MMT) Assessment BUE grossly 4/5  -[facility] 09/14/23 1009          Balance    Balance Assessment sitting static balance;sitting dynamic balance;sit to stand dynamic balance;standing static balance;standing dynamic balance  -     Static Sitting Balance standby assist  -     Dynamic Sitting Balance contact guard  -     Position, Sitting Balance unsupported;sitting edge of bed  -     Sit to Stand Dynamic Balance minimal assist;verbal cues  -     Static Standing Balance minimal assist  -     Dynamic Standing Balance minimal assist  -     Position/Device Used, Standing Balance supported  -     Balance Interventions sitting;sit to stand;occupation based/functional task  -               User Key  (r) = Recorded By, (t) = Taken By, (c) = Cosigned By      Initials Name Provider Type     Candida Deutsch OT Occupational Therapist                   Goals/Plan       Row Name 09/14/23  1011          Transfer Goal 1 (OT)    Activity/Assistive Device (Transfer Goal 1, OT) bed-to-chair/chair-to-bed;toilet  w/ appropriate AD  -     [place] Level/Cues Needed (Transfer Goal 1, OT) standby assist  -MC     Time Frame (Transfer Goal 1, OT) long term goal (LTG);10 days  -     Progress/Outcome (Transfer Goal 1, OT) goal ongoing  -[facility] Name 09/14/23 1011          Toileting Goal 1 (OT)    Activity/Device (Toileting Goal 1, OT) adjust/manage clothing;perform perineal hygiene;commode;grab bar/safety frame  -     [place] Level/Cues Needed (Toileting Goal 1, OT) standby assist  -MC     Time Frame (Toileting Goal 1, OT) long term goal (LTG);10 days  -     Progress/Outcome (Toileting Goal 1, OT) goal ongoing  -[facility] Name 09/14/23 1011          Grooming Goal 1 (OT)    Activity/Device (Grooming Goal 1, OT) hair care;oral care;wash face, hands  standing sinkside  -     [place] (Grooming Goal 1, OT) standby assist  -MC     Time Frame (Grooming Goal 1, OT) long term goal (LTG);10 days  -     Progress/Outcome (Grooming Goal 1, OT) goal ongoing  -[facility] Name 09/14/23 1011          Therapy Assessment/Plan (OT)    Planned Therapy Interventions (OT) activity tolerance training;adaptive equipment training;BADL retraining;functional balance retraining;IADL retraining;occupation/activity based interventions;patient/caregiver education/training;ROM/therapeutic exercise;strengthening exercise;transfer/mobility retraining  -               User Key  (r) = Recorded By, (t) = Taken By, (c) = Cosigned By      Initials Name Provider Type     Candida Deutsch OT Occupational Therapist                   Clinical Impression       Row Name 09/14/23 1010          Pain Assessment    Pain Intervention(s) Repositioned;Ambulation/increased activity  -     Additional Documentation Pain Scale: FACES Pre/Post-Treatment (Group)  -       Row Name 09/14/23 1010          Pain Scale: FACES  Pre/Post-Treatment    Pain: FACES Scale, Pretreatment 4-->hurts little more  -     Posttreatment Pain Rating 4-->hurts little more  -     Pain Location generalized  -     Pain Location - back  -       Row Name 09/14/23 1010          Plan of Care Review    Plan of Care Reviewed With patient  -     Outcome Evaluation Pt presents w/ dyspnea on exertion, generalized weakness, decreased functional endurance, and mild balance deficits limiting her ADL independence. Pt would benefit from continued skilled IPOT services to address current functional deficits. Rec IRF at d/c.  -[facility] Name 09/14/23 1010          Therapy Assessment/Plan (OT)    Rehab Potential (OT) good, to achieve stated therapy goals  -     Criteria for Skilled Therapeutic Interventions Met (OT) yes;skilled treatment is necessary  -     Therapy Frequency (OT) daily  -[facility] Name 09/14/23 1010          Therapy Plan Review/Discharge Plan (OT)    Anticipated Discharge Disposition (OT) inpatient rehabilitation facility  -[facility] Name 09/14/23 1010          Vital Signs    Pre SpO2 (%) 90  -     O2 Delivery Pre Treatment nasal cannula  -     Intra SpO2 (%) 86  -     O2 Delivery Intra Treatment nasal cannula  -     Post SpO2 (%) 89  -     O2 Delivery Post Treatment nasal cannula  -MC     Pre Patient Position Supine  -     Intra Patient Position Standing  -     Post Patient Position Sitting  -[facility] Name 09/14/23 1010          Positioning and Restraints    Pre-Treatment Position in bed  -     Post Treatment Position bed  -     In Bed sitting EOB;with PT  -               User Key  (r) = Recorded By, (t) = Taken By, (c) = Cosigned By      Initials Name Provider Type     Candida Deutsch, OT Occupational Therapist                   Outcome Measures       Row Name 09/14/23 1012          How much help from another is currently needed...    Putting on and taking off regular lower body clothing? 3  -      Bathing (including washing, rinsing, and drying) 3  -     Toileting (which includes using toilet bed pan or urinal) 3  -MC     Putting on and taking off regular upper body clothing 3  -     Taking care of personal grooming (such as brushing teeth) 3  -MC     Eating meals 4  -MC     AM-PAC 6 Clicks Score (OT) 19  -       Row Name 09/14/23 1012          Functional Assessment    Outcome Measure Options AM-PAC 6 Clicks Daily Activity (OT)  -               User Key  (r) = Recorded By, (t) = Taken By, (c) = Cosigned By      Initials Name Provider Type     Candida Deutsch OT Occupational Therapist                    Occupational Therapy Education       Title: PT OT SLP Therapies (In Progress)       Topic: Occupational Therapy (In Progress)       Point: ADL training (In Progress)       Description:   Instruct learner(s) on proper safety adaptation and remediation techniques during self care or transfers.   Instruct in proper use of assistive devices.                  Learning Progress Summary             Patient Acceptance, E, NR by  at 9/14/2023 1012                         Point: Home exercise program (Not Started)       Description:   Instruct learner(s) on appropriate technique for monitoring, assisting and/or progressing therapeutic exercises/activities.                  Learner Progress:  Not documented in this visit.              Point: Precautions (In Progress)       Description:   Instruct learner(s) on prescribed precautions during self-care and functional transfers.                  Learning Progress Summary             Patient Acceptance, E, NR by  at 9/14/2023 1012                         Point: Body mechanics (In Progress)       Description:   Instruct learner(s) on proper positioning and spine alignment during self-care, functional mobility activities and/or exercises.                  Learning Progress Summary             Patient Acceptance, E, NR by  at 9/14/2023 1012                                          User Key       Initials Effective Dates Name Provider Type Discipline     10/14/22 -  Candida Deutsch OT Occupational Therapist OT                  OT Recommendation and Plan  Planned Therapy Interventions (OT): activity tolerance training, adaptive equipment training, BADL retraining, functional balance retraining, IADL retraining, occupation/activity based interventions, patient/caregiver education/training, ROM/therapeutic exercise, strengthening exercise, transfer/mobility retraining  Therapy Frequency (OT): daily  Plan of Care Review  Plan of Care Reviewed With: patient  Outcome Evaluation: Pt presents w/ dyspnea on exertion, generalized weakness, decreased functional endurance, and mild balance deficits limiting her ADL independence. Pt would benefit from continued skilled IPOT services to address current functional deficits. Rec IRF at d/c.     Time Calculation:   Evaluation Complexity (OT)  Review Occupational Profile/Medical/Therapy History Complexity: expanded/moderate complexity  Assessment, Occupational Performance/Identification of Deficit Complexity: 3-5 performance deficits  Clinical Decision Making Complexity (OT): detailed assessment/moderate complexity  Overall Complexity of Evaluation (OT): moderate complexity     Time Calculation- OT       Row Name 09/14/23 1013             Time Calculation- OT    OT Start Time 0814  -      OT Received On 09/14/23  -      OT Goal Re-Cert Due Date 09/24/23  -         Timed Charges    25444 - OT Therapeutic Activity Minutes 8  -MC      58765 - OT Self Care/Mgmt Minutes 10  -MC         Untimed Charges    OT Eval/Re-eval Minutes 31  -MC         Total Minutes    Timed Charges Total Minutes 18  -MC      Untimed Charges Total Minutes 31  -MC       Total Minutes 49  -MC                User Key  (r) = Recorded By, (t) = Taken By, (c) = Cosigned By      Initials Name Provider Type     Candida Deutsch OT Occupational Therapist                   Therapy Charges for Today       Code Description Service Date Service Provider Modifiers Qty    [id number]  OT SELF CARE/MGMT/TRAIN EA 15 MIN 9/14/2023 Candida Deutsch OT GO 1    [id number]  OT EVAL MOD COMPLEXITY 3 9/14/2023 Candida Deutsch OT GO 1                 Candida Deutsch OT  9/14/2023

## 2023-09-14 NOTE — PLAN OF CARE
Goal Outcome Evaluation:  Plan of Care Reviewed With: patient           Outcome Evaluation: Pt presents w/ dyspnea on exertion, generalized weakness, decreased functional endurance, and mild balance deficits limiting her ADL independence. Pt would benefit from continued skilled IPOT services to address current functional deficits. Rec IRF at d/c.      Anticipated Discharge Disposition (OT): inpatient rehabilitation facility

## 2023-09-14 NOTE — PLAN OF CARE
Goal Outcome Evaluation:  Plan of Care Reviewed With: patient        Progress: no change  Outcome Evaluation: Patient presents with weakness, decreased endurance, balance impairments, and SOA with activity affecting independence with functional mobility. Skilled IP PT services warranted to address deficits and support return to baseline. Recommend IRF at d/c.      Anticipated Discharge Disposition (PT): inpatient rehabilitation facility

## 2023-09-14 NOTE — SIGNIFICANT NOTE
"Pt ambulated to bedside commode and when returning to the chair stated that her chest was heavy. EKG obtained and MD paged, pt initially refused EKG stating she \"didn't need it\" and was fine. Chest pain was reproducible. Pt then stated she was feeling better. Dr. Flynn aware  "

## 2023-09-14 NOTE — THERAPY EVALUATION
Patient Name: Angela Diaz Parent  : 1950    MRN: [id number]                              Today's Date: 2023       Admit Date: 2023    Visit Dx:     ICD-10-CM ICD-9-CM   1. Acute on chronic respiratory failure with hypoxia  J96.21 518.84     799.02   2. COPD exacerbation  J44.1 491.21   3. Hyponatremia  E87.1 276.1   4. Bandemia  D72.825 288.66     Patient Active Problem List   Diagnosis    Essential hypertension    Palpitations    Other forms of angina pectoris    CKD (chronic kidney disease) stage 2, GFR 60-89 ml/min    MS (multiple sclerosis)    Mild aortic regurgitation    Chronic constipation    MINDI (generalized anxiety disorder)    Polyp of colon    Hepatitis C antibody test positive    Neuropathic pain    Prediabetes    GERD without esophagitis    Hyperglycemia    Closed trimalleolar fracture, left, initial encounter    History of tobacco use    Weight loss    Chronic fatigue    Cough    Lower abdominal pain    Reactive depression    Medication monitoring encounter    Anxiety    Left lower quadrant abdominal pain    Impaired mobility    Bilious vomiting with nausea    COPD (chronic obstructive pulmonary disease)    Right upper quadrant pain    COPD with acute exacerbation    Hyponatremia    Leukocytosis    Acute-on-chronic respiratory failure     Past Medical History:   Diagnosis Date    Acid reflux     Anxiety     Anxiety     Arthritis     Cataract     CKD (chronic kidney disease) stage 2, GFR 60-89 ml/min 2018    Colon polyp     Depression     GERD (gastroesophageal reflux disease)     Headache     Heart murmur     History of blood transfusion 1971    after surgery     Hypertension 2018    Infectious viral hepatitis     Mild aortic regurgitation 2019    MS (multiple sclerosis)     Pneumonia     Prediabetes 2019     Past Surgical History:   Procedure Laterality Date    ANKLE OPEN REDUCTION INTERNAL FIXATION Left 2019    Procedure: ANKLE OPEN REDUCTION INTERNAL FIXATION;   Surgeon: Stan Lozoya MD;  Location: [facility];  Service: Orthopedics    CATARACT EXTRACTION  2009    CERVICAL BIOPSY  1971    benign    COLONOSCOPY      HEMORRHOIDECTOMY  1971    TUBAL ABDOMINAL LIGATION  1985      General Information       Row Name 09/14/23 0840          Physical Therapy Time and Intention    Document Type evaluation  -NS     Mode of Treatment physical therapy  -NS       Row Name 09/14/23 0840          General Information    Patient Profile Reviewed yes  -NS     Prior Level of Function independent:;all household mobility;gait;transfer;bed mobility;ADL's  Pt states she does not typically use AD for ambulation but has SPC if needed  -NS     Existing Precautions/Restrictions fall;oxygen therapy device and L/min  -NS     Barriers to Rehab medically complex;previous functional deficit  -NS       Row Name 09/14/23 0840          Living Environment    People in Home child(oscar), adult  -NS       Row Name 09/14/23 0840          Home Main Entrance    Number of Stairs, Main Entrance none  -NS       Row Name 09/14/23 0840          Stairs Within Home, Primary    Number of Stairs, Within Home, Primary none  -NS       Row Name 09/14/23 0840          Cognition    Orientation Status (Cognition) oriented x 3  -NS       Row Name 09/14/23 0840          Safety Issues, Functional Mobility    Safety Issues Affecting Function (Mobility) insight into deficits/self-awareness;safety precaution awareness;safety precautions follow-through/compliance;sequencing abilities;problem-solving  -NS     Impairments Affecting Function (Mobility) balance;endurance/activity tolerance;shortness of breath;strength;motor planning;sensation/sensory awareness;postural/trunk control;pain  -NS               User Key  (r) = Recorded By, (t) = Taken By, (c) = Cosigned By      Initials Name Provider Type    Courtney King PT Physical Therapist                   Mobility       Row Name 09/14/23 0840          Transfers    Comment,  (Transfers) Mild unsteadiness with standing. Pt stood twice for donning of pants, with SpO2 decreasing to 87% on 4L O2, recovering to 90% with rest and cues for PLB.  -NS       Row Name 09/14/23 0840          Sit-Stand Transfer    Sit-Stand [place] (Transfers) contact guard  -NS     Assistive Device (Sit-Stand Transfers) other (see comments)  LUE support  -NS       Row Name 09/14/23 0840          Gait/Stairs (Locomotion)    [place] Level (Gait) minimum assist (75% patient effort)  -NS     Assistive Device (Gait) other (see comments)  UE support  -NS     Distance in Feet (Gait) 5  -NS     Deviations/Abnormal Patterns (Gait) jv decreased;festinating/shuffling;gait speed decreased;stride length decreased;base of support, narrow  -NS     Bilateral Gait Deviations forward flexed posture;heel strike decreased  -NS     Comment, (Gait/Stairs) Patient ambulated at few steps to the chair, demonstrating shuffled steps and limited by weakness and dyspnea. SpO2 90% on 4L O2.  -NS               User Key  (r) = Recorded By, (t) = Taken By, (c) = Cosigned By      Initials Name Provider Type    Courtney King PT Physical Therapist                   Obj/Interventions       Row Name 09/14/23 0840          Range of Motion Comprehensive    General Range of Motion bilateral lower extremity ROM WFL  -NS       Row Name 09/14/23 0840          Strength Comprehensive (MMT)    General Manual Muscle Testing (MMT) Assessment lower extremity strength deficits identified  -NS     Comment, General Manual Muscle Testing (MMT) Assessment BLEs: grossly 4-/5  -NS       Row Name 09/14/23 0840          Balance    Balance Assessment sitting static balance;sitting dynamic balance;standing static balance;standing dynamic balance  -NS     Static Sitting Balance standby assist  -NS     Dynamic Sitting Balance standby assist  -NS     Position, Sitting Balance unsupported;sitting edge of bed  -NS     Static Standing Balance contact guard  -NS      Dynamic Standing Balance minimal assist  -NS     Position/Device Used, Standing Balance supported  -NS       Row Name 09/14/23 0840          Sensory Assessment (Somatosensory)    Sensory Assessment (Somatosensory) left LE  -NS     Left LE Sensory Assessment general sensation;impaired  Pt reports chronic tingling in her LLE since her surgery a few years ago  -NS               User Key  (r) = Recorded By, (t) = Taken By, (c) = Cosigned By      Initials Name Provider Type    Courtney King, PT Physical Therapist                   Goals/Plan       Row Name 09/14/23 0840          Bed Mobility Goal 1 (PT)    Activity/Assistive Device (Bed Mobility Goal 1, PT) sit to supine/supine to sit  -NS     [place] Level/Cues Needed (Bed Mobility Goal 1, PT) independent  -NS     Time Frame (Bed Mobility Goal 1, PT) long term goal (LTG);2 weeks  -NS       [facility] Name 09/14/23 0840          Transfer Goal 1 (PT)    Activity/Assistive Device (Transfer Goal 1, PT) sit-to-stand/stand-to-sit;bed-to-chair/chair-to-bed  -NS     [place] Level/Cues Needed (Transfer Goal 1, PT) standby assist  -NS     Time Frame (Transfer Goal 1, PT) long term goal (LTG);2 weeks  -NS       [facility] Name 09/14/23 0840          Gait Training Goal 1 (PT)    Activity/Assistive Device (Gait Training Goal 1, PT) gait (walking locomotion);increase endurance/gait distance;assistive device use  -NS     [place] Level (Gait Training Goal 1, PT) standby assist  -NS     Distance (Gait Training Goal 1, PT) 100  -NS     Time Frame (Gait Training Goal 1, PT) long term goal (LTG);2 weeks  -NS       [facility] Name 09/14/23 0840          Therapy Assessment/Plan (PT)    Planned Therapy Interventions (PT) balance training;bed mobility training;gait training;home exercise program;neuromuscular re-education;strengthening;patient/family education;transfer training  -NS               User Key  (r) = Recorded By, (t) = Taken By, (c) = Cosigned By      Initials Name Provider Type     Courtney King, PT Physical Therapist                   Clinical Impression       Row Name 09/14/23 0840          Pain    Pretreatment Pain Rating 7/10  -NS     Posttreatment Pain Rating 7/10  -NS     Pain Location generalized  -NS     Pain Location - back  -NS     Pre/Posttreatment Pain Comment RN present and aware  -NS     Pain Intervention(s) Repositioned;Ambulation/increased activity;Nursing Notified  -NS       Row Name 09/14/23 0840          Plan of Care Review    Plan of Care Reviewed With patient  -NS     Progress no change  -NS     Outcome Evaluation Patient presents with weakness, decreased endurance, balance impairments, and SOA with activity affecting independence with functional mobility. Skilled IP PT services warranted to address deficits and support return to baseline. Recommend IRF at d/c.  -NS       Row Name 09/14/23 0840          Therapy Assessment/Plan (PT)    Patient/Family Therapy Goals Statement (PT) return to independence  -NS     Rehab Potential (PT) good, to achieve stated therapy goals  -NS     Criteria for Skilled Interventions Met (PT) yes;meets criteria;skilled treatment is necessary  -NS     Therapy Frequency (PT) daily  -NS       Row Name 09/14/23 0840          Vital Signs    Pre Systolic BP Rehab 168  -NS     Pre Treatment Diastolic BP 89  -NS     Post Systolic BP Rehab 176  -NS     Post Treatment Diastolic BP 94  -NS     Pretreatment Heart Rate (beats/min) 99  -NS     Posttreatment Heart Rate (beats/min) 102  -NS     Pre SpO2 (%) 88  -NS     O2 Delivery Pre Treatment nasal cannula  2L  -NS     Intra SpO2 (%) 86  -NS     O2 Delivery Intra Treatment nasal cannula  3L  -NS     Post SpO2 (%) 92  -NS     O2 Delivery Post Treatment nasal cannula  2L  -NS     Pre Patient Position Sitting  -NS     Intra Patient Position Standing  -NS     Post Patient Position Sitting  -NS       Row Name 09/14/23 0840          Positioning and Restraints    Pre-Treatment Position in bed  -NS     Post  Treatment Position chair  -NS     In Chair notified nsg;reclined;call light within reach;encouraged to call for assist;exit alarm on;RUE elevated;LUE elevated;legs elevated;waffle cushion;heels elevated  -NS               User Key  (r) = Recorded By, (t) = Taken By, (c) = Cosigned By      Initials Name Provider Type    Courtney King, PT Physical Therapist                   Outcome Measures       Row Name 09/14/23 0840          How much help from another person do you currently need...    Turning from your back to your side while in flat bed without using bedrails? 3  -NS     Moving from lying on back to sitting on the side of a flat bed without bedrails? 3  -NS     Moving to and from a bed to a chair (including a wheelchair)? 3  -NS     Standing up from a chair using your arms (e.g., wheelchair, bedside chair)? 3  -NS     Climbing 3-5 steps with a railing? 2  -NS     To walk in hospital room? 3  -NS     AM-PAC 6 Clicks Score (PT) 17  -NS     Highest level of mobility 5 --> Static standing  -NS       Row Name 09/14/23 1012 09/14/23 0840       Functional Assessment    Outcome Measure Options AM-PAC 6 Clicks Daily Activity (OT)  - AM-PAC 6 Clicks Basic Mobility (PT)  -NS              User Key  (r) = Recorded By, (t) = Taken By, (c) = Cosigned By      Initials Name Provider Type    Courtney King, PT Physical Therapist    Candida Grijalva, OT Occupational Therapist                                 Physical Therapy Education       Title: PT OT SLP Therapies (In Progress)       Topic: Physical Therapy (In Progress)       Point: Mobility training (Done)       Learning Progress Summary             Patient Acceptance, E, VU,NR by NS at 9/14/2023 1022                         Point: Home exercise program (Not Started)       Learner Progress:  Not documented in this visit.              Point: Body mechanics (Done)       Learning Progress Summary             Patient Acceptance, E, VU,NR by NS at 9/14/2023 1022                          Point: Precautions (Done)       Learning Progress Summary             Patient Acceptance, E, VU,NR by NS at 9/14/2023 1022                                         User Key       Initials Effective Dates Name Provider Type Discipline    NS 06/16/21 -  Courtney Vazquez PT Physical Therapist PT                  PT Recommendation and Plan  Planned Therapy Interventions (PT): balance training, bed mobility training, gait training, home exercise program, neuromuscular re-education, strengthening, patient/family education, transfer training  Plan of Care Reviewed With: patient  Progress: no change  Outcome Evaluation: Patient presents with weakness, decreased endurance, balance impairments, and SOA with activity affecting independence with functional mobility. Skilled IP PT services warranted to address deficits and support return to baseline. Recommend IRF at d/c.     Time Calculation:   PT Evaluation Complexity  History, PT Evaluation Complexity: 3 or more personal factors and/or comorbidities  Examination of Body Systems (PT Eval Complexity): total of 4 or more elements  Clinical Presentation (PT Evaluation Complexity): evolving  Clinical Decision Making (PT Evaluation Complexity): moderate complexity  Overall Complexity (PT Evaluation Complexity): moderate complexity     PT Charges       Row Name 09/14/23 0840             Time Calculation    Start Time 0840  -NS      PT Received On 09/14/23  -NS      PT Goal Re-Cert Due Date 09/24/23  -NS         Timed Charges    21264 - PT Therapeutic Activity Minutes 10  -NS         Untimed Charges    PT Eval/Re-eval Minutes 46  -NS         Total Minutes    Timed Charges Total Minutes 10  -NS      Untimed Charges Total Minutes 46  -NS       Total Minutes 56  -NS                User Key  (r) = Recorded By, (t) = Taken By, (c) = Cosigned By      Initials Name Provider Type    Courtney King PT Physical Therapist                  Therapy Charges for Today       Code  Description Service Date Service Provider Modifiers Qty    46211041964 HC PT THERAPEUTIC ACT EA 15 MIN 9/14/2023 Courtney Vazquez, PT GP 1    35184831386 HC PT EVAL MOD COMPLEXITY 4 9/14/2023 Courtney Vazquez, PT GP 1            PT G-Codes  Outcome Measure Options: AM-PAC 6 Clicks Daily Activity (OT)  AM-PAC 6 Clicks Score (PT): 17  AM-PAC 6 Clicks Score (OT): 19  PT Discharge Summary  Anticipated Discharge Disposition (PT): inpatient rehabilitation facility    Courtney Vazquez PT  9/14/2023

## 2023-09-14 NOTE — PROGRESS NOTES
[facility] Medicine Services  PROGRESS NOTE    Patient Name: Angela Diaz Parent  : 1950  MRN: [id number]    Date of Admission: 2023  Primary Care Physician: Rosalba Howard APRN    Subjective   Subjective     CC: Follow-up SOA    HPI: Patient states she continues to feel poorly, has SOA      Objective   Objective     Vital Signs:   Temp:  [97.8 °F (36.6 °C)-98.6 °F (37 °C)] 98.3 °F (36.8 °C)  Heart Rate:  [] 72  Resp:  [16-22] 18  BP: (154-188)/() 179/91  Flow (L/min):  [2-3.5] 2     Physical Exam:  Constitutional: No acute distress, awake, alert  HENT: NCAT, mucous membranes moist  Respiratory: Nonlabored respirations, diminished breath sounds bilaterally on 2 L NC  Cardiovascular: RRR, no murmurs, rubs, or gallops  Gastrointestinal: Positive bowel sounds, soft, nontender, nondistended  Musculoskeletal: No bilateral ankle edema  Psychiatric: Appropriate affect, cooperative  Neurologic: Nonfocal  Skin: No rashes     Results Reviewed:  LAB RESULTS:      Lab 23  0308 23  1232   WBC 15.38* 19.20*   HEMOGLOBIN 11.4* 12.9   HEMATOCRIT 32.7* 37.8   PLATELETS 482* 555*   NEUTROS ABS  --  15.58*   IMMATURE GRANS (ABS)  --  0.30*   LYMPHS ABS  --  1.70   MONOS ABS  --  1.55*   EOS ABS  --  0.03   MCV 93.2 93.8   PROCALCITONIN  --  0.22   LACTATE  --  1.4         Lab 23  0308 23  1232   SODIUM 127* 125*   POTASSIUM 4.8 4.7   CHLORIDE 90* 86*   CO2 25.0 28.0   ANION GAP 12.0 11.0   BUN 13 10   CREATININE 0.56* 0.57   EGFR 95.9 95.5   GLUCOSE 99 120*   CALCIUM 9.2 9.4   HEMOGLOBIN A1C 5.70*  --    TSH 0.665  --          Lab 23  1232   TOTAL PROTEIN 7.3   ALBUMIN 3.8   GLOBULIN 3.5   ALT (SGPT) 15   AST (SGOT) 25   BILIRUBIN 0.3   ALK PHOS 148*         Lab 23  1232   PROBNP 565.0   HSTROP T 21*         Lab 23  0308   CHOLESTEROL 152   LDL CHOL 92   HDL CHOL 45   TRIGLYCERIDES 76             Brief Urine Lab Results  (Last result in  the past 365 days)        Color   Clarity   Blood   Leuk Est   Nitrite   Protein   CREAT   Urine HCG        09/13/23 1837 Yellow   Clear   Negative   Negative   Negative   100 mg/dL (2+)                   Microbiology Results Abnormal       Procedure Component Value - Date/Time    Legionella Antigen, Urine - Urine, Urine, Clean Catch [[id number]]  (Normal) Collected: 09/13/23 1837    Lab Status: Final result Specimen: Urine, Clean Catch Updated: 09/14/23 0004     LEGIONELLA ANTIGEN, URINE Negative    S. Pneumo Ag Urine or CSF - Urine, Urine, Clean Catch [[id number]]  (Normal) Collected: 09/13/23 1837    Lab Status: Final result Specimen: Urine, Clean Catch Updated: 09/14/23 0004     Strep Pneumo Ag Negative    COVID PRE-OP / PRE-PROCEDURE SCREENING ORDER (NO ISOLATION) - Swab, Nasopharynx [[id number]]  (Normal) Collected: 09/13/23 1314    Lab Status: Final result Specimen: Swab from Nasopharynx Updated: 09/13/23 1417    Narrative:      The following orders were created for panel order COVID PRE-OP / PRE-PROCEDURE SCREENING ORDER (NO ISOLATION) - Swab, Nasopharynx.  Procedure                               Abnormality         Status                     ---------                               -----------         ------                     COVID-19 and FLU A/B PCR...[[id number]]  Normal              Final result                 Please view results for these tests on the individual orders.    COVID-19 and FLU A/B PCR - Swab, Nasopharynx [[id number]]  (Normal) Collected: 09/13/23 1314    Lab Status: Final result Specimen: Swab from Nasopharynx Updated: 09/13/23 1417     COVID19 Not Detected     Influenza A PCR Not Detected     Influenza B PCR Not Detected    Narrative:      Fact sheet for providers: https://www.fda.gov/media/050511/download    Fact sheet for patients: https://www.fda.gov/media/362587/download    Test performed by PCR.            XR Chest 1 View    Result Date: 9/13/2023  XR CHEST 1 VW Date of Exam: 9/13/2023  12:40 PM EDT Indication: SOA triage protocol Comparison: 3/2/2022 Findings: There are no airspace consolidations. There is slight hyperinflation with emphysema. No pleural fluid. No pneumothorax. The pulmonary vasculature appears within normal limits. The cardiac and mediastinal silhouette appear unremarkable. No acute osseous abnormality identified.     Impression: Impression: No acute pulmonary process COPD/emphysema Electronically Signed: Lawrence Ocampo MD  9/13/2023 12:51 PM EDT  Workstation ID: [id number]     Results for orders placed during the hospital encounter of 01/03/19    Adult Transthoracic Echo Complete W/ Cont if Necessary Per Protocol    Interpretation Summary  · Left ventricular systolic function is normal.  · Estimated EF appears to be in the range of 61 - 65%.  · Mild aortic valve regurgitation is present.      Current medications:  Scheduled Meds:doxycycline, 100 mg, Oral, Q12H  heparin (porcine), 5,000 Units, Subcutaneous, Q8H  ipratropium-albuterol, 3 mL, Nebulization, Q6H - RT  methylPREDNISolone sodium succinate, 62.5 mg, Intravenous, Daily  mirtazapine, 30 mg, Oral, Nightly  OXcarbazepine, 300 mg, Oral, BID  pharmacy consult - MTM, , Does not apply, Daily  polyethylene glycol, 17 g, Oral, Daily  QUEtiapine, 100 mg, Oral, Nightly  sodium chloride, 10 mL, Intravenous, Q12H  tiZANidine, 4 mg, Oral, BID  vitamin B-12, 500 mcg, Oral, Daily      Continuous Infusions:sodium chloride, 100 mL/hr, Last Rate: 100 mL/hr (09/14/23 0529)      PRN Meds:.  acetaminophen **OR** acetaminophen **OR** acetaminophen    albuterol sulfate HFA    benzonatate    Calcium Replacement - Follow Nurse / BPA Driven Protocol    HYDROcodone-acetaminophen    ipratropium-albuterol    Magnesium Standard Dose Replacement - Follow Nurse / BPA Driven Protocol    nitroglycerin    ondansetron **OR** ondansetron    Phosphorus Replacement - Follow Nurse / BPA Driven Protocol    Potassium Replacement - Follow Nurse / BPA Driven  Protocol    sodium chloride    sodium chloride    sodium chloride    Assessment & Plan   Assessment & Plan     Active Hospital Problems    Diagnosis  POA    **COPD with acute exacerbation [J44.1]  Yes    Hyponatremia [E87.1]  Unknown    Leukocytosis [D72.829]  Unknown    Acute-on-chronic respiratory failure [J96.20]  Yes    History of tobacco use [Z87.891]  Not Applicable    Hepatitis C antibody test positive [R76.8]  Yes    MINDI (generalized anxiety disorder) [F41.1]  Yes    Chronic constipation [K59.09]  Yes    MS (multiple sclerosis) [G35]  Yes    Essential hypertension [I10]  Yes      Resolved Hospital Problems   No resolved problems to display.        Brief Hospital Course to date:  Ms. Parent is a 71 yo WF prior smoker (15 pack year history, quit in August 2023) with PMH of HTN, MS, depression, Hep C  and COPD not on supplemental O2 who presented with acute COPD exacerbation.     This patient's problems and plans were partially entered by my partner and updated as appropriate by me 09/14/23.     Acute COPD exacerbation  Leukocytosis  -- not on supplemental O2 at baseline, currently requiring 2L NC, wean as appropriate, anticipate she may need home O2 at discharge  -- CXR with no acute process  -- leukocytosis with L shift and low grade fever at home.  Procal wnl  -- Legionella and strep pneumo urinary antigens are negative, COVID and flu were negative on admission  --Continue DuoNebs, doxycycline and IV Solu-Medrol  -- scheduled and PRN duonebs     Hyponatremia  -- Na+ of 125 on presentation, currently improving, has previously been low  -- Suspect this to be hypovolemic hyponatremia from poor p.o. intake  -- Continue IV fluids for 24 hours, decreased  NS to 50 mL/H  - follow-up repeat labs      MS  -- follows with Dr. Petit  -- continue home meds  -- uses walker, cane and wheelchair intermittently at home  -- Awaiting PT/OT to evaluate     Hypertension  -- BP significantly elevated, however not on any home  meds  --We will start amlodipine 5 mg daily, as needed IV hydralazine in place     Chronic Constipation  --continue Miralax     H/o Hep C ab+     H/o Tobacco Abuse  -- smoked 1/2 ppd for 30 years and quit last month  -- encouraged continued cessation      Expected Discharge Location and Transportation: Home versus rehab  Expected Discharge   Expected Discharge Date: 9/15/2023; Expected Discharge Time:      DVT prophylaxis:  Medical DVT prophylaxis orders are present.          CODE STATUS:   Code Status and Medical Interventions:   Ordered at: 09/13/23 1621     Level Of Support Discussed With:    Patient     Code Status (Patient has no pulse and is not breathing):    CPR (Attempt to Resuscitate)     Medical Interventions (Patient has pulse or is breathing):    Full Support       Tramaine Flynn MD  09/14/23

## 2023-09-15 PROBLEM — E43 SEVERE MALNUTRITION: Status: ACTIVE | Noted: 2023-09-15

## 2023-09-15 LAB
B PARAPERT DNA SPEC QL NAA+PROBE: NOT DETECTED
B PERT DNA SPEC QL NAA+PROBE: NOT DETECTED
BACTERIA SPEC RESP CULT: NORMAL
C PNEUM DNA NPH QL NAA+NON-PROBE: NOT DETECTED
FLUAV SUBTYP SPEC NAA+PROBE: NOT DETECTED
FLUBV RNA ISLT QL NAA+PROBE: NOT DETECTED
GRAM STN SPEC: NORMAL
HADV DNA SPEC NAA+PROBE: NOT DETECTED
HCOV 229E RNA SPEC QL NAA+PROBE: NOT DETECTED
HCOV HKU1 RNA SPEC QL NAA+PROBE: NOT DETECTED
HCOV NL63 RNA SPEC QL NAA+PROBE: NOT DETECTED
HCOV OC43 RNA SPEC QL NAA+PROBE: NOT DETECTED
HMPV RNA NPH QL NAA+NON-PROBE: NOT DETECTED
HPIV1 RNA ISLT QL NAA+PROBE: NOT DETECTED
HPIV2 RNA SPEC QL NAA+PROBE: NOT DETECTED
HPIV3 RNA NPH QL NAA+PROBE: NOT DETECTED
HPIV4 P GENE NPH QL NAA+PROBE: NOT DETECTED
M PNEUMO IGG SER IA-ACNC: NOT DETECTED
RHINOVIRUS RNA SPEC NAA+PROBE: NOT DETECTED
RSV RNA NPH QL NAA+NON-PROBE: NOT DETECTED
SARS-COV-2 RNA NPH QL NAA+NON-PROBE: NOT DETECTED

## 2023-09-15 PROCEDURE — 0202U NFCT DS 22 TRGT SARS-COV-2: CPT | Performed by: INTERNAL MEDICINE

## 2023-09-15 PROCEDURE — 99233 SBSQ HOSP IP/OBS HIGH 50: CPT | Performed by: FAMILY MEDICINE

## 2023-09-15 PROCEDURE — 25010000002 HEPARIN (PORCINE) PER 1000 UNITS: Performed by: INTERNAL MEDICINE

## 2023-09-15 PROCEDURE — 94799 UNLISTED PULMONARY SVC/PX: CPT

## 2023-09-15 PROCEDURE — 87205 SMEAR GRAM STAIN: CPT | Performed by: INTERNAL MEDICINE

## 2023-09-15 PROCEDURE — 25010000002 METHYLPREDNISOLONE PER 125 MG: Performed by: INTERNAL MEDICINE

## 2023-09-15 PROCEDURE — 94664 DEMO&/EVAL PT USE INHALER: CPT

## 2023-09-15 PROCEDURE — 25010000002 METHYLPREDNISOLONE PER 40 MG: Performed by: FAMILY MEDICINE

## 2023-09-15 RX ORDER — METHYLPREDNISOLONE SODIUM SUCCINATE 40 MG/ML
40 INJECTION, POWDER, LYOPHILIZED, FOR SOLUTION INTRAMUSCULAR; INTRAVENOUS EVERY 12 HOURS
Status: DISCONTINUED | OUTPATIENT
Start: 2023-09-15 | End: 2023-09-16

## 2023-09-15 RX ORDER — AMLODIPINE BESYLATE 10 MG/1
10 TABLET ORAL
Status: DISCONTINUED | OUTPATIENT
Start: 2023-09-16 | End: 2023-09-20 | Stop reason: HOSPADM

## 2023-09-15 RX ORDER — BUDESONIDE 0.5 MG/2ML
0.5 INHALANT ORAL
Status: DISCONTINUED | OUTPATIENT
Start: 2023-09-15 | End: 2023-09-20 | Stop reason: HOSPADM

## 2023-09-15 RX ORDER — AMLODIPINE BESYLATE 5 MG/1
5 TABLET ORAL ONCE
Status: COMPLETED | OUTPATIENT
Start: 2023-09-15 | End: 2023-09-15

## 2023-09-15 RX ORDER — TIZANIDINE 4 MG/1
8 TABLET ORAL NIGHTLY
Status: DISCONTINUED | OUTPATIENT
Start: 2023-09-15 | End: 2023-09-17

## 2023-09-15 RX ORDER — OXCARBAZEPINE 150 MG/1
600 TABLET, FILM COATED ORAL NIGHTLY
Status: DISCONTINUED | OUTPATIENT
Start: 2023-09-15 | End: 2023-09-20 | Stop reason: HOSPADM

## 2023-09-15 RX ADMIN — DOXYCYCLINE 100 MG: 100 CAPSULE ORAL at 08:15

## 2023-09-15 RX ADMIN — HYDROCODONE BITARTRATE AND ACETAMINOPHEN 1 TABLET: 10; 325 TABLET ORAL at 12:16

## 2023-09-15 RX ADMIN — BENZONATATE 200 MG: 100 CAPSULE ORAL at 05:11

## 2023-09-15 RX ADMIN — HEPARIN SODIUM 5000 UNITS: 5000 INJECTION, SOLUTION INTRAVENOUS; SUBCUTANEOUS at 20:25

## 2023-09-15 RX ADMIN — IPRATROPIUM BROMIDE AND ALBUTEROL SULFATE 3 ML: 2.5; .5 SOLUTION RESPIRATORY (INHALATION) at 00:42

## 2023-09-15 RX ADMIN — Medication 10 ML: at 20:26

## 2023-09-15 RX ADMIN — HEPARIN SODIUM 5000 UNITS: 5000 INJECTION, SOLUTION INTRAVENOUS; SUBCUTANEOUS at 05:06

## 2023-09-15 RX ADMIN — CYANOCOBALAMIN TAB 1000 MCG 500 MCG: 1000 TAB at 08:15

## 2023-09-15 RX ADMIN — TIZANIDINE 8 MG: 4 TABLET ORAL at 20:24

## 2023-09-15 RX ADMIN — DOXYCYCLINE 100 MG: 100 CAPSULE ORAL at 20:24

## 2023-09-15 RX ADMIN — SODIUM CHLORIDE 50 ML/HR: 9 INJECTION, SOLUTION INTRAVENOUS at 05:17

## 2023-09-15 RX ADMIN — IPRATROPIUM BROMIDE AND ALBUTEROL SULFATE 3 ML: 2.5; .5 SOLUTION RESPIRATORY (INHALATION) at 06:45

## 2023-09-15 RX ADMIN — HYDROCODONE BITARTRATE AND ACETAMINOPHEN 1 TABLET: 10; 325 TABLET ORAL at 20:32

## 2023-09-15 RX ADMIN — IPRATROPIUM BROMIDE AND ALBUTEROL SULFATE 3 ML: 2.5; .5 SOLUTION RESPIRATORY (INHALATION) at 12:47

## 2023-09-15 RX ADMIN — HYDROCODONE BITARTRATE AND ACETAMINOPHEN 1 TABLET: 10; 325 TABLET ORAL at 05:11

## 2023-09-15 RX ADMIN — BENZONATATE 200 MG: 100 CAPSULE ORAL at 20:24

## 2023-09-15 RX ADMIN — BUDESONIDE 0.5 MG: 0.5 INHALANT RESPIRATORY (INHALATION) at 12:48

## 2023-09-15 RX ADMIN — AMLODIPINE BESYLATE 5 MG: 5 TABLET ORAL at 12:07

## 2023-09-15 RX ADMIN — BUDESONIDE 0.5 MG: 0.5 INHALANT RESPIRATORY (INHALATION) at 18:45

## 2023-09-15 RX ADMIN — QUETIAPINE FUMARATE 100 MG: 100 TABLET ORAL at 20:24

## 2023-09-15 RX ADMIN — AMLODIPINE BESYLATE 5 MG: 5 TABLET ORAL at 08:15

## 2023-09-15 RX ADMIN — HEPARIN SODIUM 5000 UNITS: 5000 INJECTION, SOLUTION INTRAVENOUS; SUBCUTANEOUS at 17:22

## 2023-09-15 RX ADMIN — METHYLPREDNISOLONE SODIUM SUCCINATE 62.5 MG: 125 INJECTION, POWDER, FOR SOLUTION INTRAMUSCULAR; INTRAVENOUS at 08:15

## 2023-09-15 RX ADMIN — IPRATROPIUM BROMIDE AND ALBUTEROL SULFATE 3 ML: 2.5; .5 SOLUTION RESPIRATORY (INHALATION) at 18:45

## 2023-09-15 RX ADMIN — MIRTAZAPINE 30 MG: 15 TABLET, FILM COATED ORAL at 20:24

## 2023-09-15 RX ADMIN — POLYETHYLENE GLYCOL 3350 17 G: 17 POWDER, FOR SOLUTION ORAL at 08:15

## 2023-09-15 RX ADMIN — OXCARBAZEPINE 600 MG: 150 TABLET, FILM COATED ORAL at 20:24

## 2023-09-15 RX ADMIN — METHYLPREDNISOLONE SODIUM SUCCINATE 40 MG: 40 INJECTION, POWDER, LYOPHILIZED, FOR SOLUTION INTRAMUSCULAR; INTRAVENOUS at 17:23

## 2023-09-15 NOTE — PROGRESS NOTES
[facility]  PROGRESS NOTE    Patient Name: Angela Diaz Parent  : 1950  MRN: [id number]    Date of Admission: 2023  Primary Care Physician: Rosalba Howard APRN    Subjective   Subjective     CC:   Follow-up SOA    HPI:   Patient seen and examined. Doesn't feel well. Tired, no energy, still has SOB and cough. We discussed rehab.    Objective   Objective     Vital Signs:   Temp:  [97.6 °F (36.4 °C)-98 °F (36.7 °C)] 97.9 °F (36.6 °C)  Heart Rate:  [] 88  Resp:  [16-20] 16  BP: (129-192)/(68-97) 173/88  Flow (L/min):  [3] 3     Physical Exam:  Constitutional: No acute distress, awake, alert, chronically ill appearing, frail appearing   HENT: NCAT, mucous membranes moist  Respiratory: Nonlabored respirations, diminished breath sounds in bases bilaterally on 3 L NC  Cardiovascular: RRR, no murmurs, rubs, or gallops  Gastrointestinal: Positive bowel sounds, soft, nontender, nondistended  Musculoskeletal: No bilateral ankle edema  Psychiatric: Appropriate affect, cooperative  Neurologic: Nonfocal  Skin: No rashes     Results Reviewed:  LAB RESULTS:      Lab 23  0308 23  1232   WBC 15.38* 19.20*   HEMOGLOBIN 11.4* 12.9   HEMATOCRIT 32.7* 37.8   PLATELETS 482* 555*   NEUTROS ABS  --  15.58*   IMMATURE GRANS (ABS)  --  0.30*   LYMPHS ABS  --  1.70   MONOS ABS  --  1.55*   EOS ABS  --  0.03   MCV 93.2 93.8   PROCALCITONIN  --  0.22   LACTATE  --  1.4         Lab 23  0308 23  1232   SODIUM 127* 125*   POTASSIUM 4.8 4.7   CHLORIDE 90* 86*   CO2 25.0 28.0   ANION GAP 12.0 11.0   BUN 13 10   CREATININE 0.56* 0.57   EGFR 95.9 95.5   GLUCOSE 99 120*   CALCIUM 9.2 9.4   HEMOGLOBIN A1C 5.70*  --    TSH 0.665  --          Lab 23  1232   TOTAL PROTEIN 7.3   ALBUMIN 3.8   GLOBULIN 3.5   ALT (SGPT) 15   AST (SGOT) 25   BILIRUBIN 0.3   ALK PHOS 148*         Lab 23  1232   PROBNP 565.0   HSTROP T 21*         Lab 23  0308   CHOLESTEROL  152   LDL CHOL 92   HDL CHOL 45   TRIGLYCERIDES 76             Brief Urine Lab Results  (Last result in the past 365 days)        Color   Clarity   Blood   Leuk Est   Nitrite   Protein   CREAT   Urine HCG        09/13/23 1837 Yellow   Clear   Negative   Negative   Negative   100 mg/dL (2+)                   Microbiology Results Abnormal       Procedure Component Value - Date/Time    Respiratory Panel PCR w/COVID-19(SARS-CoV-2) ALEXANDER/GHANSHYAM/LAURENT/PAD/COR/MAD/ZULLY In-House, NP Swab in UTM/VTM, 3-4 HR TAT - Swab, Nasopharynx [[id number]]  (Normal) Collected: 09/15/23 0545    Lab Status: Final result Specimen: Swab from Nasopharynx Updated: 09/15/23 0635     ADENOVIRUS, PCR Not Detected     Coronavirus 229E Not Detected     Coronavirus HKU1 Not Detected     Coronavirus NL63 Not Detected     Coronavirus OC43 Not Detected     COVID19 Not Detected     Human Metapneumovirus Not Detected     Human Rhinovirus/Enterovirus Not Detected     Influenza A PCR Not Detected     Influenza B PCR Not Detected     Parainfluenza Virus 1 Not Detected     Parainfluenza Virus 2 Not Detected     Parainfluenza Virus 3 Not Detected     Parainfluenza Virus 4 Not Detected     RSV, PCR Not Detected     Bordetella pertussis pcr Not Detected     Bordetella parapertussis PCR Not Detected     Chlamydophila pneumoniae PCR Not Detected     Mycoplasma pneumo by PCR Not Detected    Narrative:      In the setting of a positive respiratory panel with a viral infection PLUS a negative procalcitonin without other underlying concern for bacterial infection, consider observing off antibiotics or discontinuation of antibiotics and continue supportive care. If the respiratory panel is positive for atypical bacterial infection (Bordetella pertussis, Chlamydophila pneumoniae, or Mycoplasma pneumoniae), consider antibiotic de-escalation to target atypical bacterial infection.    Blood Culture - Blood, Arm, Right [[id number]]  (Normal) Collected: 09/13/23 1309    Lab Status:  Preliminary result Specimen: Blood from Arm, Right Updated: 09/14/23 1400     Blood Culture No growth at 24 hours    Blood Culture - Blood, Wrist, Left [[id number]]  (Normal) Collected: 09/13/23 1313    Lab Status: Preliminary result Specimen: Blood from Wrist, Left Updated: 09/14/23 1400     Blood Culture No growth at 24 hours    MRSA Screen, PCR (Inpatient) - Swab, Nares [[id number]]  (Normal) Collected: 09/14/23 0601    Lab Status: Final result Specimen: Swab from Nares Updated: 09/14/23 0910     MRSA PCR Negative    Narrative:      The negative predictive value of this diagnostic test is high and should only be used to consider de-escalating anti-MRSA therapy. A positive result may indicate colonization with MRSA and must be correlated clinically.  MRSA Negative    Legionella Antigen, Urine - Urine, Urine, Clean Catch [[id number]]  (Normal) Collected: 09/13/23 1837    Lab Status: Final result Specimen: Urine, Clean Catch Updated: 09/14/23 0004     LEGIONELLA ANTIGEN, URINE Negative    S. Pneumo Ag Urine or CSF - Urine, Urine, Clean Catch [[id number]]  (Normal) Collected: 09/13/23 1837    Lab Status: Final result Specimen: Urine, Clean Catch Updated: 09/14/23 0004     Strep Pneumo Ag Negative    COVID PRE-OP / PRE-PROCEDURE SCREENING ORDER (NO ISOLATION) - Swab, Nasopharynx [[id number]]  (Normal) Collected: 09/13/23 1314    Lab Status: Final result Specimen: Swab from Nasopharynx Updated: 09/13/23 1417    Narrative:      The following orders were created for panel order COVID PRE-OP / PRE-PROCEDURE SCREENING ORDER (NO ISOLATION) - Swab, Nasopharynx.  Procedure                               Abnormality         Status                     ---------                               -----------         ------                     COVID-19 and FLU A/B PCR...[[id number]]  Normal              Final result                 Please view results for these tests on the individual orders.    COVID-19 and FLU A/B PCR - Swab, Nasopharynx  [[id number]]  (Normal) Collected: 09/13/23 1314    Lab Status: Final result Specimen: Swab from Nasopharynx Updated: 09/13/23 1417     COVID19 Not Detected     Influenza A PCR Not Detected     Influenza B PCR Not Detected    Narrative:      Fact sheet for providers: https://www.fda.gov/media/723465/download    Fact sheet for patients: https://www.fda.gov/media/060770/download    Test performed by PCR.            XR Chest 1 View    Result Date: 9/13/2023  XR CHEST 1 VW Date of Exam: 9/13/2023 12:40 PM EDT Indication: SOA triage protocol Comparison: 3/2/2022 Findings: There are no airspace consolidations. There is slight hyperinflation with emphysema. No pleural fluid. No pneumothorax. The pulmonary vasculature appears within normal limits. The cardiac and mediastinal silhouette appear unremarkable. No acute osseous abnormality identified.     Impression: Impression: No acute pulmonary process COPD/emphysema Electronically Signed: Lawrence Ocampo MD  9/13/2023 12:51 PM EDT  Workstation ID: [id number]     Results for orders placed during the hospital encounter of 01/03/19    Adult Transthoracic Echo Complete W/ Cont if Necessary Per Protocol    Interpretation Summary  · Left ventricular systolic function is normal.  · Estimated EF appears to be in the range of 61 - 65%.  · Mild aortic valve regurgitation is present.      Current medications:  Scheduled Meds:[START ON 9/16/2023] amLODIPine, 10 mg, Oral, Q24H  amLODIPine, 5 mg, Oral, Once  doxycycline, 100 mg, Oral, Q12H  heparin (porcine), 5,000 Units, Subcutaneous, Q8H  ipratropium-albuterol, 3 mL, Nebulization, Q6H - RT  methylPREDNISolone sodium succinate, 40 mg, Intravenous, Q12H  mirtazapine, 30 mg, Oral, Nightly  OXcarbazepine, 600 mg, Oral, Nightly  pharmacy consult - MTM, , Does not apply, Daily  polyethylene glycol, 17 g, Oral, Daily  QUEtiapine, 100 mg, Oral, Nightly  sodium chloride, 10 mL, Intravenous, Q12H  tiZANidine, 8 mg, Oral, Nightly  vitamin B-12, 500  mcg, Oral, Daily      Continuous Infusions:     PRN Meds:.  acetaminophen **OR** acetaminophen **OR** acetaminophen    albuterol sulfate HFA    benzonatate    Calcium Replacement - Follow Nurse / BPA Driven Protocol    hydrALAZINE    HYDROcodone-acetaminophen    ipratropium-albuterol    Magnesium Standard Dose Replacement - Follow Nurse / BPA Driven Protocol    nitroglycerin    ondansetron **OR** ondansetron    Phosphorus Replacement - Follow Nurse / BPA Driven Protocol    Potassium Replacement - Follow Nurse / BPA Driven Protocol    sodium chloride    sodium chloride    sodium chloride    Assessment & Plan   Assessment & Plan     Active Hospital Problems    Diagnosis  POA    **COPD with acute exacerbation [J44.1]  Yes    Severe malnutrition [E43]  Yes    Hyponatremia [E87.1]  Unknown    Leukocytosis [D72.829]  Unknown    Acute-on-chronic respiratory failure [J96.20]  Yes    History of tobacco use [Z87.891]  Not Applicable    Hepatitis C antibody test positive [R76.8]  Yes    MINDI (generalized anxiety disorder) [F41.1]  Yes    Chronic constipation [K59.09]  Yes    MS (multiple sclerosis) [G35]  Yes    Essential hypertension [I10]  Yes      Resolved Hospital Problems   No resolved problems to display.        Brief Hospital Course to date:  Ms. Warren is a 73 yo WF prior smoker (15 pack year history, quit in August 2023) with PMH of HTN, MS, depression, Hep C  and COPD not on supplemental O2 who presented with acute COPD exacerbation.     This patient's problems and plans were partially entered by my partner and updated as appropriate by me 09/15/23.   All problems are new to me today    Acute COPD exacerbation  Leukocytosis  -- not on supplemental O2 at baseline, currently requiring 2-3L NC, wean as appropriate, anticipate she may need home O2 at discharge  -- CXR with no acute process  -- Legionella and strep pneumo urinary antigens are negative, COVID and flu were negative on admission  --Continue DuoNebs,  doxycycline and IV Solu-Medrol (change to 40mg IV q12)  -- scheduled and PRN duonebs  --Add Pulmicort Nebs      Hyponatremia  -- Suspect this to be hypovolemic hyponatremia from poor p.o. intake  -- BMP in AM  -- DC IVF      MS  -- follows with Dr. Petit  -- continue home meds  -- uses walker, cane and wheelchair intermittently at home  -- PT/OT recommends rehab, patient agreeable, CM to follow-up today      Hypertension  -- BP significantly elevated, however not on any home meds  -- Increase Amlodipine to 10mg daily  -- PRN Hydralazine      Chronic Constipation  --continue Miralax     H/o Hep C ab+     H/o Tobacco Abuse  -- smoked 1/2 ppd for 30 years and quit last month  -- encouraged continued cessation      Expected Discharge Location and Transportation: Rehab  Expected Discharge   Expected Discharge Date: 9/18/2023; Expected Discharge Time:      DVT prophylaxis:  Medical DVT prophylaxis orders are present.     AM-PAC 6 Clicks Score (PT): 17 (09/14/23 2026)    CODE STATUS:   Code Status and Medical Interventions:   Ordered at: 09/13/23 1621     Level Of Support Discussed With:    Patient     Code Status (Patient has no pulse and is not breathing):    CPR (Attempt to Resuscitate)     Medical Interventions (Patient has pulse or is breathing):    Full Support       Mari Paredes,   09/15/23

## 2023-09-15 NOTE — PROGRESS NOTES
Clinical Nutrition   Nutrition Support Assessment  Reason for Visit: BMI      Patient Name: Angela Diaz Parent  YOB: 1950  MRN: [id number]  Date of Encounter: 09/15/23 09:48 EDT  Admission date: 9/13/2023    Comments:  Pt meets criteria for severe malnutrition in the context of chronic illness indicated by po intake <75% x >1 month, severe muscle wasting and subcutaneous fat loss. Of note, pt w/insidious wt loss of 26lbs x 3 yrs.    Ordered Boost+ BID; encourage po intake.     Nutrition Assessment   Admission Diagnosis:  COPD with acute exacerbation [J44.1]      Problem List:    COPD with acute exacerbation    Essential hypertension    MS (multiple sclerosis)    Chronic constipation    MINDI (generalized anxiety disorder)    Hepatitis C antibody test positive    History of tobacco use    Hyponatremia    Leukocytosis    Acute-on-chronic respiratory failure        PMH:   She  has a past medical history of Acid reflux, Anxiety, Anxiety, Arthritis, Cataract, CKD (chronic kidney disease) stage 2, GFR 60-89 ml/min (2018), Colon polyp, Depression, GERD (gastroesophageal reflux disease), Headache, Heart murmur, History of blood transfusion (1971), Hypertension (2018), Infectious viral hepatitis, Mild aortic regurgitation (01/03/2019), MS (multiple sclerosis), Pneumonia, and Prediabetes (12/12/2019).    PSH:  She  has a past surgical history that includes Cataract extraction (2009); Tubal ligation (1985); Hemorrhoid surgery (1971); Cervical biopsy (1971); Ankle fracture surgery (Left, 12/28/2019); and Colonoscopy.    Applicable Nutrition Concerns:   Skin:  Oral:  GI:    Applicable Interval History:         Reported/Observed/Food/Nutrition Related History:     RD spoke w/pt at bedside, pt finished breakfast and asks to have a blueberry muffin and orange w/breakfast trays. Pt does report minimal po intake 2/2 lack of appetite x 2 weeks, mainly eating yogurt. Pt states typical intake is 2 meals  "daily, soup or sandwich around noon, burrito or tacos at dinner-pt's son prepares meals. Pt also drinks Boost+ 1-2 times inconsistently. [id number], states was this wt ~1 yr ago though wt hx in EMR indicates closer to 3 yrs ago. NKFA.     Anthropometrics     Flowsheet Rows      Flowsheet Row First Filed Value   Admission Height 157.5 cm (62\") Documented at 09/13/2023 1228   Admission Weight 41.3 kg (91 lb) Documented at 09/13/2023 1228          Height: Height: 157.5 cm (62\")  Last Filed Weight: Weight: 41 kg (90 lb 6.4 oz) (09/14/23 0413)  Method: Weight Method: Stated  BMI: BMI (Calculated): 16.5  BMI classification: Underweight:<18.5kg/m2    UBW:  115lbs per pt report (states 115lbs ~ 1 yr ago)  Weight change:      Weight       Weight (kg) Weight (lbs) Weight Method Visit Report   3/2/2022 44.906 kg  99 lb  Stated  --     45.269 kg  99 lb 12.8 oz   --    1/5/2023 43.545 kg  96 lb   --    8/24/2023 41.459 kg  91 lb 6.4 oz   --    9/13/2023 41.277 kg  91 lb  Stated     9/14/2023 41.005 kg  90 lb 6.4 oz          Nutrition Focused Physical Exam     Date:     9/15    Patient meets criteria for malnutrition diagnosis, see MSA note.    Current Nutrition Prescription   PO: Diet: Regular/House Diet; Texture: Regular Texture (IDDSI 7); Fluid Consistency: Thin (IDDSI 0)  Oral Nutrition Supplement:   Intake: Insufficient data, RD observed pt ate 100% of entree    Nutrition Diagnosis   Date:  9/15            Updated:    Problem Malnutrition severe chronic   Etiology    Signs/Symptoms    Status:       Goal:   General: Nutrition to support treatment  PO: Establish PO  EN/PN: N/A    Nutrition Intervention      Follow treatment progress, Care plan reviewed, Encourage intake, Supplement provided    Boost+ (abdias) BID  Pt likes blueberry muffin and oranges w/breakfast trays-ordered.     Monitoring/Evaluation:   Per protocol, PO intake, Supplement intake, Weight      Donna Tian RD  Time Spent: 30  "

## 2023-09-15 NOTE — PROGRESS NOTES
Malnutrition Severity Assessment    Patient Name:  Angela Diaz Parent  YOB: 1950  MRN: [id number]  Admit Date:  9/13/2023    Patient meets criteria for : Severe Malnutrition    Comments:  Pt meets criteria for severe malnutrition in the context of chronic illness indicated by po intake <75% x >1 month, severe muscle wasting and subcutaneous fat loss. Of note, pt w/insidious wt loss of 26lbs x 3 yrs.    Malnutrition Severity Assessment  Malnutrition Type: Chronic Disease - Related Malnutrition  Malnutrition Type (last 8 hours)       Malnutrition Severity Assessment       Row Name 09/15/23 1002       Malnutrition Severity Assessment    Malnutrition Type Chronic Disease - Related Malnutrition      Row Name 09/15/23 1002       Insufficient Energy Intake     Insufficient Energy Intake Findings Severe    Insufficient Energy Intake  <75% of est. energy requirement for > or equal to 1 month      Row Name 09/15/23 1002       Muscle Loss    Loss of Muscle Mass Findings Severe    Mu-ism Region Severe - deep hollowing/scooping, lack of muscle to touch, facial bones well defined    Clavicle Bone Region Severe - protruding prominent bone    Acromion Bone Region Severe - squared shoulders, bones, and acromion process protrusion prominent    Scapular Bone Region Severe - prominent bones, depressions easily visible between ribs, scapula, spine, shoulders    Dorsal Hand Region Severe - prominent depression    Patellar Region Severe - prominent bone, square looking, very little muscle definition    Anterior Thigh Region Severe - line/depression along thigh, obviously thin    Posterior Calf Region Severe - thin with very little definition/firmness      Row Name 09/15/23 1002       Fat Loss    Subcutaneous Fat Loss Findings Severe    Orbital Region  Severe - pronounced hollowness/depression, dark circles, loose saggy skin    Upper Arm Region Severe - mostly skin, very little space between folds, fingers touch      Row  Name 09/15/23 1002       Criteria Met (Must meet criteria for severity in at least 2 of these categories: M Wasting, Fat Loss, Fluid, Secondary Signs, Wt. Status, Intake)    Patient meets criteria for  Severe Malnutrition                    Electronically signed by:  Donna Tian RD  09/15/23 10:18 EDT

## 2023-09-15 NOTE — CASE MANAGEMENT/SOCIAL WORK
Continued Stay Note  [facility]     Patient Name: Angela Diaz Parent  MRN: [id number]  Today's Date: 9/15/2023    Admit Date: 9/13/2023    Plan: discharge plan   Discharge Plan       Row Name 09/15/23 1209       Plan    Plan discharge plan    Plan Comments I met with pt at bedside regarding discharge plan. Pt is agreeable to inpatient rehab per PT/OT recommendations.  Pt aware of rehab options.. [facility] [place] and [facility]. Pt is asking about assistance buying Boost as it is so expensive. I asked NAT Grant to see pt. CM will follow up Monday.    Final Discharge Disposition Code 62 - inpatient rehab facility                   Discharge Codes    No documentation.                 Expected Discharge Date and Time       Expected Discharge Date Expected Discharge Time    Sep 18, 2023               Yara Case RN

## 2023-09-15 NOTE — CONSULTS
Referring Provider: MD Rafa  Reason for Consultation: AECOPD    Subjective .   Education: NN spoke with pt at BS.  Pt alert and able to answer questions appropriately.  Pt O2 sat 92% on  3 L currently, no home O2 use.  Pt reports the ability to ambulate to her home bathroom at baseline before experiencing SOB.  Pt states use of rescue medication  4-5 times daily, relief of SOB within  ~2 mins.  Patient is up to date on COVID vaccine but not flu and PNA vaccines.  Former smoker, quit date August 2023.  Continued cessation encouraged.  Pt reports no issues at this time with medications or transportation for appointments.  Pt reports no previous hx of formal COPD teaching, no understanding of action plan, or DC.  Stop light report, instructions for accessing [employer] and list of educational videos given to pt.  1800QUITNOW reference sheet discussed and given to patient at BS.  COPD education completed in the form of explanation, handouts, and videos.  No new concerns or questions voiced at this time.  NN will continue to follow as needed.     Age: 72 y.o.  Sex: female  Smoker Status: former, Pack years unclear  Pulmonologist: GEE  FEV1 (PFT): NA  Home O2: 3L    Objective     SpO2 SpO2: 93 % (09/15/23 0705)  Device Device (Oxygen Therapy): nasal cannula (09/15/23 0705)  Flow Flow (L/min): 3 (09/15/23 0705)  Incentive Spirometer    IS Predicted Level (mL)     Number of Repetitions     Level Incentive Spirometer (mL)    Patient Tolerance     Inhaler Treatment Status    Treatment Route        Home Medications:  Medications Prior to Admission   Medication Sig Dispense Refill Last Dose    albuterol sulfate  (90 Base) MCG/ACT inhaler Inhale 2 puffs Every 4 (Four) Hours As Needed for Wheezing. 18 g 6 Past Week    HYDROcodone-acetaminophen (NORCO)  MG per tablet Take 1 tablet by mouth Every 6 (Six) Hours As Needed.   Past Week    mirtazapine (REMERON) 30 MG tablet Take 1 tablet by mouth every night at bedtime. 90  tablet 3 9/12/2023    OXcarbazepine (TRILEPTAL) 300 MG tablet TAKE 1 TABLET BY MOUTH TWICE A DAY (Patient taking differently: Take 2 tablets by mouth Every Night.) 180 tablet 1 9/12/2023    polyethylene glycol (MIRALAX) powder Take 17 g by mouth Daily. (Patient taking differently: Take 17 g by mouth Daily. OTC) 1 each 11 9/12/2023    QUEtiapine (SEROquel) 100 MG tablet TAKE 1 TABLET BY MOUTH EVERY DAY AT NIGHT 90 tablet 3 9/12/2023    tiZANidine (ZANAFLEX) 4 MG tablet Take 1 tablet by mouth 2 (Two) Times a Day. (Patient taking differently: Take 2 tablets by mouth Every Night.) 180 tablet 3 9/12/2023    vitamin B-12 (CYANOCOBALAMIN) 500 MCG tablet Take 1 tablet by mouth Daily. OTC   9/12/2023    Zinc 100 MG tablet Take 1 tablet by mouth Daily. OTC   9/12/2023       Discussion: Per current GOLD Standards, please consider: No LAMA/LABA/ICS in place, Outpatient PFT, Rehab as appropriate, Palliative Care consult, NRT at MN, Annual LDCT per current screening guidelines (age 50-80 years old, smoking history of 20 pack years or more or has quit within past 15 years)           Krissy Rangel RN

## 2023-09-16 PROCEDURE — 94799 UNLISTED PULMONARY SVC/PX: CPT

## 2023-09-16 PROCEDURE — 94664 DEMO&/EVAL PT USE INHALER: CPT

## 2023-09-16 PROCEDURE — 99232 SBSQ HOSP IP/OBS MODERATE 35: CPT | Performed by: FAMILY MEDICINE

## 2023-09-16 PROCEDURE — 25010000002 METHYLPREDNISOLONE PER 40 MG: Performed by: FAMILY MEDICINE

## 2023-09-16 PROCEDURE — 25010000002 HEPARIN (PORCINE) PER 1000 UNITS: Performed by: INTERNAL MEDICINE

## 2023-09-16 RX ORDER — LIDOCAINE 4 G/G
1 PATCH TOPICAL
Status: DISCONTINUED | OUTPATIENT
Start: 2023-09-16 | End: 2023-09-20 | Stop reason: HOSPADM

## 2023-09-16 RX ORDER — METHYLPREDNISOLONE SODIUM SUCCINATE 40 MG/ML
40 INJECTION, POWDER, LYOPHILIZED, FOR SOLUTION INTRAMUSCULAR; INTRAVENOUS EVERY 8 HOURS
Status: DISCONTINUED | OUTPATIENT
Start: 2023-09-16 | End: 2023-09-18

## 2023-09-16 RX ADMIN — POLYETHYLENE GLYCOL 3350 17 G: 17 POWDER, FOR SOLUTION ORAL at 08:38

## 2023-09-16 RX ADMIN — IPRATROPIUM BROMIDE AND ALBUTEROL SULFATE 3 ML: 2.5; .5 SOLUTION RESPIRATORY (INHALATION) at 13:32

## 2023-09-16 RX ADMIN — LIDOCAINE 1 PATCH: 560 PATCH PERCUTANEOUS; TOPICAL; TRANSDERMAL at 09:36

## 2023-09-16 RX ADMIN — HEPARIN SODIUM 5000 UNITS: 5000 INJECTION, SOLUTION INTRAVENOUS; SUBCUTANEOUS at 21:28

## 2023-09-16 RX ADMIN — METHYLPREDNISOLONE SODIUM SUCCINATE 40 MG: 40 INJECTION, POWDER, LYOPHILIZED, FOR SOLUTION INTRAMUSCULAR; INTRAVENOUS at 14:35

## 2023-09-16 RX ADMIN — METHYLPREDNISOLONE SODIUM SUCCINATE 40 MG: 40 INJECTION, POWDER, LYOPHILIZED, FOR SOLUTION INTRAMUSCULAR; INTRAVENOUS at 06:45

## 2023-09-16 RX ADMIN — HYDROCODONE BITARTRATE AND ACETAMINOPHEN 1 TABLET: 10; 325 TABLET ORAL at 21:28

## 2023-09-16 RX ADMIN — AMLODIPINE BESYLATE 10 MG: 10 TABLET ORAL at 08:38

## 2023-09-16 RX ADMIN — TIZANIDINE 8 MG: 4 TABLET ORAL at 21:28

## 2023-09-16 RX ADMIN — HYDROCODONE BITARTRATE AND ACETAMINOPHEN 1 TABLET: 10; 325 TABLET ORAL at 09:36

## 2023-09-16 RX ADMIN — MIRTAZAPINE 30 MG: 15 TABLET, FILM COATED ORAL at 21:28

## 2023-09-16 RX ADMIN — BUDESONIDE 0.5 MG: 0.5 INHALANT RESPIRATORY (INHALATION) at 07:43

## 2023-09-16 RX ADMIN — Medication 10 ML: at 08:39

## 2023-09-16 RX ADMIN — OXCARBAZEPINE 600 MG: 150 TABLET, FILM COATED ORAL at 21:33

## 2023-09-16 RX ADMIN — Medication 10 ML: at 21:28

## 2023-09-16 RX ADMIN — IPRATROPIUM BROMIDE AND ALBUTEROL SULFATE 3 ML: 2.5; .5 SOLUTION RESPIRATORY (INHALATION) at 19:02

## 2023-09-16 RX ADMIN — DOXYCYCLINE 100 MG: 100 CAPSULE ORAL at 21:28

## 2023-09-16 RX ADMIN — BUDESONIDE 0.5 MG: 0.5 INHALANT RESPIRATORY (INHALATION) at 19:03

## 2023-09-16 RX ADMIN — HEPARIN SODIUM 5000 UNITS: 5000 INJECTION, SOLUTION INTRAVENOUS; SUBCUTANEOUS at 14:35

## 2023-09-16 RX ADMIN — DOXYCYCLINE 100 MG: 100 CAPSULE ORAL at 08:38

## 2023-09-16 RX ADMIN — HEPARIN SODIUM 5000 UNITS: 5000 INJECTION, SOLUTION INTRAVENOUS; SUBCUTANEOUS at 06:45

## 2023-09-16 RX ADMIN — IPRATROPIUM BROMIDE AND ALBUTEROL SULFATE 3 ML: 2.5; .5 SOLUTION RESPIRATORY (INHALATION) at 07:43

## 2023-09-16 RX ADMIN — CYANOCOBALAMIN TAB 1000 MCG 500 MCG: 1000 TAB at 08:38

## 2023-09-16 RX ADMIN — QUETIAPINE FUMARATE 100 MG: 100 TABLET ORAL at 21:28

## 2023-09-16 NOTE — PROGRESS NOTES
[facility] Medicine Services  PROGRESS NOTE    Patient Name: Angela Diaz Parent  : 1950  MRN: [id number]    Date of Admission: 2023  Primary Care Physician: Rosalba Howard APRN    Subjective   Subjective     CC:   Follow-up SOA    HPI:   Patient seen and examined. Still not feeling well. Her chest is sore from coughing. She is very hungry, asks for another breakfast tray. Discussed likely secondary to steroids. She doesn't want labs drawn today because they bruised her yesterday.     Objective   Objective     Vital Signs:   Temp:  [97.4 °F (36.3 °C)-98.4 °F (36.9 °C)] 98.4 °F (36.9 °C)  Heart Rate:  [] 96  Resp:  [16-20] 16  BP: ()/(55-99) 146/90  Flow (L/min):  [3] 3     Physical Exam:  Constitutional: No acute distress, awake, alert, chronically ill appearing, frail appearing   HENT: NCAT, mucous membranes moist  Respiratory: Nonlabored respirations, wheezing/coarse breath sounds bilaterally on 3 L NC, chest wall TTP   Cardiovascular: RRR, no murmurs, rubs, or gallops  Gastrointestinal: Positive bowel sounds, soft, nontender, nondistended  Musculoskeletal: No bilateral ankle edema  Psychiatric: Appropriate affect, cooperative  Neurologic: Nonfocal  Skin: No rashes     Results Reviewed:  LAB RESULTS:      Lab 23  0308 23  1232   WBC 15.38* 19.20*   HEMOGLOBIN 11.4* 12.9   HEMATOCRIT 32.7* 37.8   PLATELETS 482* 555*   NEUTROS ABS  --  15.58*   IMMATURE GRANS (ABS)  --  0.30*   LYMPHS ABS  --  1.70   MONOS ABS  --  1.55*   EOS ABS  --  0.03   MCV 93.2 93.8   PROCALCITONIN  --  0.22   LACTATE  --  1.4         Lab 23  0308 23  1232   SODIUM 127* 125*   POTASSIUM 4.8 4.7   CHLORIDE 90* 86*   CO2 25.0 28.0   ANION GAP 12.0 11.0   BUN 13 10   CREATININE 0.56* 0.57   EGFR 95.9 95.5   GLUCOSE 99 120*   CALCIUM 9.2 9.4   HEMOGLOBIN A1C 5.70*  --    TSH 0.665  --          Lab 23  1232   TOTAL PROTEIN 7.3   ALBUMIN 3.8   GLOBULIN 3.5   ALT  (SGPT) 15   AST (SGOT) 25   BILIRUBIN 0.3   ALK PHOS 148*         Lab 09/13/23  1232   PROBNP 565.0   HSTROP T 21*         Lab 09/14/23  0308   CHOLESTEROL 152   LDL CHOL 92   HDL CHOL 45   TRIGLYCERIDES 76             Brief Urine Lab Results  (Last result in the past 365 days)        Color   Clarity   Blood   Leuk Est   Nitrite   Protein   CREAT   Urine HCG        09/13/23 1837 Yellow   Clear   Negative   Negative   Negative   100 mg/dL (2+)                   Microbiology Results Abnormal       Procedure Component Value - Date/Time    Respiratory Culture - Sputum, Cough [[id number]] Collected: 09/15/23 1806    Lab Status: Final result Specimen: Sputum from Cough Updated: 09/15/23 1903     Respiratory Culture Rejected     Gram Stain Moderate (3+) Epithelial cells per low power field      Moderate (3+) WBCs per low power field      Many (4+) Gram positive cocci in chains      Few (2+) Gram negative bacilli    Narrative:      Specimen rejected due to oropharyngeal contamination. Please reorder and recollect specimen if clinically necessary.    Blood Culture - Blood, Arm, Right [[id number]]  (Normal) Collected: 09/13/23 1309    Lab Status: Preliminary result Specimen: Blood from Arm, Right Updated: 09/15/23 1400     Blood Culture No growth at 2 days    Blood Culture - Blood, Wrist, Left [[id number]]  (Normal) Collected: 09/13/23 1313    Lab Status: Preliminary result Specimen: Blood from Wrist, Left Updated: 09/15/23 1400     Blood Culture No growth at 2 days    Respiratory Panel PCR w/COVID-19(SARS-CoV-2) ALEXANDER/GHANSHYAM/LAURENT/PAD/COR/MAD/ZULLY In-House, NP Swab in UTM/[facility], 3-4 HR TAT - Swab, Nasopharynx [[id number]]  (Normal) Collected: 09/15/23 0545    Lab Status: Final result Specimen: Swab from Nasopharynx Updated: 09/15/23 0635     ADENOVIRUS, PCR Not Detected     Coronavirus 229E Not Detected     Coronavirus HKU1 Not Detected     Coronavirus NL63 Not Detected     Coronavirus OC43 Not Detected     COVID19 Not Detected     Human  Metapneumovirus Not Detected     Human Rhinovirus/Enterovirus Not Detected     Influenza A PCR Not Detected     Influenza B PCR Not Detected     Parainfluenza Virus 1 Not Detected     Parainfluenza Virus 2 Not Detected     Parainfluenza Virus 3 Not Detected     Parainfluenza Virus 4 Not Detected     RSV, PCR Not Detected     Bordetella pertussis pcr Not Detected     Bordetella parapertussis PCR Not Detected     Chlamydophila pneumoniae PCR Not Detected     Mycoplasma pneumo by PCR Not Detected    Narrative:      In the setting of a positive respiratory panel with a viral infection PLUS a negative procalcitonin without other underlying concern for bacterial infection, consider observing off antibiotics or discontinuation of antibiotics and continue supportive care. If the respiratory panel is positive for atypical bacterial infection (Bordetella pertussis, Chlamydophila pneumoniae, or Mycoplasma pneumoniae), consider antibiotic de-escalation to target atypical bacterial infection.    MRSA Screen, PCR (Inpatient) - Swab, Nares [[id number]]  (Normal) Collected: 09/14/23 0601    Lab Status: Final result Specimen: Swab from Nares Updated: 09/14/23 0910     MRSA PCR Negative    Narrative:      The negative predictive value of this diagnostic test is high and should only be used to consider de-escalating anti-MRSA therapy. A positive result may indicate colonization with MRSA and must be correlated clinically.  MRSA Negative    Legionella Antigen, Urine - Urine, Urine, Clean Catch [[id number]]  (Normal) Collected: 09/13/23 1837    Lab Status: Final result Specimen: Urine, Clean Catch Updated: 09/14/23 0004     LEGIONELLA ANTIGEN, URINE Negative    S. Pneumo Ag Urine or CSF - Urine, Urine, Clean Catch [[id number]]  (Normal) Collected: 09/13/23 1837    Lab Status: Final result Specimen: Urine, Clean Catch Updated: 09/14/23 0004     Strep Pneumo Ag Negative    COVID PRE-OP / PRE-PROCEDURE SCREENING ORDER (NO ISOLATION) - Swab,  Nasopharynx [[id number]]  (Normal) Collected: 09/13/23 1314    Lab Status: Final result Specimen: Swab from Nasopharynx Updated: 09/13/23 1417    Narrative:      The following orders were created for panel order COVID PRE-OP / PRE-PROCEDURE SCREENING ORDER (NO ISOLATION) - Swab, Nasopharynx.  Procedure                               Abnormality         Status                     ---------                               -----------         ------                     COVID-19 and FLU A/B PCR...[[id number]]  Normal              Final result                 Please view results for these tests on the individual orders.    COVID-19 and FLU A/B PCR - Swab, Nasopharynx [[id number]]  (Normal) Collected: 09/13/23 1314    Lab Status: Final result Specimen: Swab from Nasopharynx Updated: 09/13/23 1417     COVID19 Not Detected     Influenza A PCR Not Detected     Influenza B PCR Not Detected    Narrative:      Fact sheet for providers: https://www.fda.gov/media/169198/download    Fact sheet for patients: https://www.fda.gov/media/826916/download    Test performed by PCR.            No radiology results from the last 24 hrs    Results for orders placed during the hospital encounter of 01/03/19    Adult Transthoracic Echo Complete W/ Cont if Necessary Per Protocol    Interpretation Summary  · Left ventricular systolic function is normal.  · Estimated EF appears to be in the range of 61 - 65%.  · Mild aortic valve regurgitation is present.      Current medications:  Scheduled Meds:amLODIPine, 10 mg, Oral, Q24H  budesonide, 0.5 mg, Nebulization, BID - RT  doxycycline, 100 mg, Oral, Q12H  heparin (porcine), 5,000 Units, Subcutaneous, Q8H  ipratropium-albuterol, 3 mL, Nebulization, Q6H - RT  Lidocaine, 1 patch, Transdermal, Q24H  methylPREDNISolone sodium succinate, 40 mg, Intravenous, Q8H  mirtazapine, 30 mg, Oral, Nightly  OXcarbazepine, 600 mg, Oral, Nightly  pharmacy consult - MTM, , Does not apply, Daily  polyethylene glycol, 17 g,  Oral, Daily  QUEtiapine, 100 mg, Oral, Nightly  sodium chloride, 10 mL, Intravenous, Q12H  tiZANidine, 8 mg, Oral, Nightly  vitamin B-12, 500 mcg, Oral, Daily      Continuous Infusions:     PRN Meds:.  acetaminophen **OR** acetaminophen **OR** acetaminophen    albuterol sulfate HFA    benzonatate    Calcium Replacement - Follow Nurse / BPA Driven Protocol    hydrALAZINE    HYDROcodone-acetaminophen    ipratropium-albuterol    Magnesium Standard Dose Replacement - Follow Nurse / BPA Driven Protocol    nitroglycerin    ondansetron **OR** ondansetron    Phosphorus Replacement - Follow Nurse / BPA Driven Protocol    Potassium Replacement - Follow Nurse / BPA Driven Protocol    sodium chloride    sodium chloride    sodium chloride    Assessment & Plan   Assessment & Plan     Active Hospital Problems    Diagnosis  POA    **COPD with acute exacerbation [J44.1]  Yes    Severe malnutrition [E43]  Yes    Hyponatremia [E87.1]  Unknown    Leukocytosis [D72.829]  Unknown    Acute-on-chronic respiratory failure [J96.20]  Yes    History of tobacco use [Z87.891]  Not Applicable    Hepatitis C antibody test positive [R76.8]  Yes    MINDI (generalized anxiety disorder) [F41.1]  Yes    Chronic constipation [K59.09]  Yes    MS (multiple sclerosis) [G35]  Yes    Essential hypertension [I10]  Yes      Resolved Hospital Problems   No resolved problems to display.        Brief Hospital Course to date:  Ms. Warren is a 71 yo WF prior smoker (15 pack year history, quit in August 2023) with PMH of HTN, MS, depression, Hep C  and COPD not on supplemental O2 who presented with acute COPD exacerbation.     This patient's problems and plans were partially entered by my partner and updated as appropriate by me 09/16/23.     Acute COPD exacerbation  Leukocytosis  -- not on supplemental O2 at baseline, currently requiring 2-3L NC, wean as appropriate, anticipate she may need home O2 at discharge  -- CXR on admission with no acute process. Will repeat  in AM   -- Legionella and strep pneumo urinary antigens are negative, COVID and flu were negative on admission  --Continue DuoNebs, doxycycline and IV Solu-Medrol (change to 40mg IV q8)  -- scheduled and PRN duonebs  --continue Pulmicort Nebs   --Trial of Lidoderm patch to chest wall      Hyponatremia  -- Suspect this to be hypovolemic hyponatremia from poor p.o. intake  -- Declined lab draw this AM      MS  -- follows with Dr. Petit  -- continue home meds  -- uses walker, cane and wheelchair intermittently at home  -- PT/OT recommends rehab, patient agreeable, CM following, referrals sent     Hypertension  -- not on any home meds  -- Continue Amlodipine 10mg daily  -- PRN Hydralazine      Chronic Constipation  --continue Miralax     H/o Hep C ab+     H/o Tobacco Abuse  -- smoked 1/2 ppd for 30 years and quit last month  -- encouraged continued cessation      Expected Discharge Location and Transportation: Rehab  Expected Discharge   Expected Discharge Date: 9/18/2023; Expected Discharge Time:      DVT prophylaxis:  Medical DVT prophylaxis orders are present.     AM-PAC 6 Clicks Score (PT): 17 (09/14/23 2026)    CODE STATUS:   Code Status and Medical Interventions:   Ordered at: 09/13/23 1621     Level Of Support Discussed With:    Patient     Code Status (Patient has no pulse and is not breathing):    CPR (Attempt to Resuscitate)     Medical Interventions (Patient has pulse or is breathing):    Full Support       Mari Paredes,   09/16/23

## 2023-09-17 ENCOUNTER — APPOINTMENT (OUTPATIENT)
Dept: GENERAL RADIOLOGY | Facility: HOSPITAL | Age: 73
DRG: 190 | End: 2023-09-17
Payer: MEDICARE

## 2023-09-17 ENCOUNTER — APPOINTMENT (OUTPATIENT)
Dept: CT IMAGING | Facility: HOSPITAL | Age: 73
DRG: 190 | End: 2023-09-17
Payer: MEDICARE

## 2023-09-17 LAB
ANION GAP SERPL CALCULATED.3IONS-SCNC: 10 MMOL/L (ref 5–15)
BASOPHILS # BLD AUTO: 0.03 10*3/MM3 (ref 0–0.2)
BASOPHILS NFR BLD AUTO: 0.2 % (ref 0–1.5)
BUN SERPL-MCNC: 30 MG/DL (ref 8–23)
BUN/CREAT SERPL: 38.5 (ref 7–25)
CALCIUM SPEC-SCNC: 9 MG/DL (ref 8.6–10.5)
CHLORIDE SERPL-SCNC: 97 MMOL/L (ref 98–107)
CO2 SERPL-SCNC: 22 MMOL/L (ref 22–29)
CREAT SERPL-MCNC: 0.78 MG/DL (ref 0.57–1)
DEPRECATED RDW RBC AUTO: 49.2 FL (ref 37–54)
EGFRCR SERPLBLD CKD-EPI 2021: 79.8 ML/MIN/1.73
EOSINOPHIL # BLD AUTO: 0 10*3/MM3 (ref 0–0.4)
EOSINOPHIL NFR BLD AUTO: 0 % (ref 0.3–6.2)
ERYTHROCYTE [DISTWIDTH] IN BLOOD BY AUTOMATED COUNT: 14 % (ref 12.3–15.4)
GLUCOSE SERPL-MCNC: 216 MG/DL (ref 65–99)
HCT VFR BLD AUTO: 32.5 % (ref 34–46.6)
HGB BLD-MCNC: 10.7 G/DL (ref 12–15.9)
IMM GRANULOCYTES # BLD AUTO: 0.37 10*3/MM3 (ref 0–0.05)
IMM GRANULOCYTES NFR BLD AUTO: 2.6 % (ref 0–0.5)
LYMPHOCYTES # BLD AUTO: 1.51 10*3/MM3 (ref 0.7–3.1)
LYMPHOCYTES NFR BLD AUTO: 10.6 % (ref 19.6–45.3)
MCH RBC QN AUTO: 31.6 PG (ref 26.6–33)
MCHC RBC AUTO-ENTMCNC: 32.9 G/DL (ref 31.5–35.7)
MCV RBC AUTO: 95.9 FL (ref 79–97)
MONOCYTES # BLD AUTO: 0.67 10*3/MM3 (ref 0.1–0.9)
MONOCYTES NFR BLD AUTO: 4.7 % (ref 5–12)
NEUTROPHILS NFR BLD AUTO: 11.68 10*3/MM3 (ref 1.7–7)
NEUTROPHILS NFR BLD AUTO: 81.9 % (ref 42.7–76)
NRBC BLD AUTO-RTO: 0 /100 WBC (ref 0–0.2)
PLATELET # BLD AUTO: 473 10*3/MM3 (ref 140–450)
PMV BLD AUTO: 9 FL (ref 6–12)
POTASSIUM SERPL-SCNC: 4.8 MMOL/L (ref 3.5–5.2)
RBC # BLD AUTO: 3.39 10*6/MM3 (ref 3.77–5.28)
SODIUM SERPL-SCNC: 129 MMOL/L (ref 136–145)
WBC NRBC COR # BLD: 14.26 10*3/MM3 (ref 3.4–10.8)

## 2023-09-17 PROCEDURE — 94799 UNLISTED PULMONARY SVC/PX: CPT

## 2023-09-17 PROCEDURE — 94664 DEMO&/EVAL PT USE INHALER: CPT

## 2023-09-17 PROCEDURE — 80048 BASIC METABOLIC PNL TOTAL CA: CPT | Performed by: FAMILY MEDICINE

## 2023-09-17 PROCEDURE — 71046 X-RAY EXAM CHEST 2 VIEWS: CPT

## 2023-09-17 PROCEDURE — 25010000002 HEPARIN (PORCINE) PER 1000 UNITS: Performed by: INTERNAL MEDICINE

## 2023-09-17 PROCEDURE — 71250 CT THORAX DX C-: CPT

## 2023-09-17 PROCEDURE — 99232 SBSQ HOSP IP/OBS MODERATE 35: CPT | Performed by: FAMILY MEDICINE

## 2023-09-17 PROCEDURE — 85025 COMPLETE CBC W/AUTO DIFF WBC: CPT | Performed by: FAMILY MEDICINE

## 2023-09-17 PROCEDURE — 25010000002 METHYLPREDNISOLONE PER 40 MG: Performed by: FAMILY MEDICINE

## 2023-09-17 RX ORDER — TIZANIDINE 4 MG/1
4 TABLET ORAL NIGHTLY
Status: DISCONTINUED | OUTPATIENT
Start: 2023-09-17 | End: 2023-09-20 | Stop reason: HOSPADM

## 2023-09-17 RX ADMIN — Medication 10 ML: at 22:18

## 2023-09-17 RX ADMIN — AMLODIPINE BESYLATE 10 MG: 10 TABLET ORAL at 09:15

## 2023-09-17 RX ADMIN — IPRATROPIUM BROMIDE AND ALBUTEROL SULFATE 3 ML: 2.5; .5 SOLUTION RESPIRATORY (INHALATION) at 14:12

## 2023-09-17 RX ADMIN — BUDESONIDE 0.5 MG: 0.5 INHALANT RESPIRATORY (INHALATION) at 09:49

## 2023-09-17 RX ADMIN — DOXYCYCLINE 100 MG: 100 CAPSULE ORAL at 09:15

## 2023-09-17 RX ADMIN — HYDROCODONE BITARTRATE AND ACETAMINOPHEN 1 TABLET: 10; 325 TABLET ORAL at 22:28

## 2023-09-17 RX ADMIN — METHYLPREDNISOLONE SODIUM SUCCINATE 40 MG: 40 INJECTION, POWDER, LYOPHILIZED, FOR SOLUTION INTRAMUSCULAR; INTRAVENOUS at 06:19

## 2023-09-17 RX ADMIN — METHYLPREDNISOLONE SODIUM SUCCINATE 40 MG: 40 INJECTION, POWDER, LYOPHILIZED, FOR SOLUTION INTRAMUSCULAR; INTRAVENOUS at 00:40

## 2023-09-17 RX ADMIN — IPRATROPIUM BROMIDE AND ALBUTEROL SULFATE 3 ML: 2.5; .5 SOLUTION RESPIRATORY (INHALATION) at 19:14

## 2023-09-17 RX ADMIN — QUETIAPINE FUMARATE 100 MG: 100 TABLET ORAL at 22:18

## 2023-09-17 RX ADMIN — HEPARIN SODIUM 5000 UNITS: 5000 INJECTION, SOLUTION INTRAVENOUS; SUBCUTANEOUS at 22:17

## 2023-09-17 RX ADMIN — HEPARIN SODIUM 5000 UNITS: 5000 INJECTION, SOLUTION INTRAVENOUS; SUBCUTANEOUS at 06:19

## 2023-09-17 RX ADMIN — TIZANIDINE 4 MG: 4 TABLET ORAL at 20:08

## 2023-09-17 RX ADMIN — Medication 10 ML: at 09:16

## 2023-09-17 RX ADMIN — MIRTAZAPINE 30 MG: 15 TABLET, FILM COATED ORAL at 22:17

## 2023-09-17 RX ADMIN — OXCARBAZEPINE 600 MG: 150 TABLET, FILM COATED ORAL at 22:18

## 2023-09-17 RX ADMIN — CYANOCOBALAMIN TAB 1000 MCG 500 MCG: 1000 TAB at 09:15

## 2023-09-17 RX ADMIN — POLYETHYLENE GLYCOL 3350 17 G: 17 POWDER, FOR SOLUTION ORAL at 09:14

## 2023-09-17 RX ADMIN — METHYLPREDNISOLONE SODIUM SUCCINATE 40 MG: 40 INJECTION, POWDER, LYOPHILIZED, FOR SOLUTION INTRAMUSCULAR; INTRAVENOUS at 16:07

## 2023-09-17 RX ADMIN — IPRATROPIUM BROMIDE AND ALBUTEROL SULFATE 3 ML: 2.5; .5 SOLUTION RESPIRATORY (INHALATION) at 09:49

## 2023-09-17 RX ADMIN — HYDROCODONE BITARTRATE AND ACETAMINOPHEN 1 TABLET: 10; 325 TABLET ORAL at 12:39

## 2023-09-17 RX ADMIN — DOXYCYCLINE 100 MG: 100 CAPSULE ORAL at 22:18

## 2023-09-17 RX ADMIN — HEPARIN SODIUM 5000 UNITS: 5000 INJECTION, SOLUTION INTRAVENOUS; SUBCUTANEOUS at 16:06

## 2023-09-17 RX ADMIN — METHYLPREDNISOLONE SODIUM SUCCINATE 40 MG: 40 INJECTION, POWDER, LYOPHILIZED, FOR SOLUTION INTRAMUSCULAR; INTRAVENOUS at 22:17

## 2023-09-17 RX ADMIN — BUDESONIDE 0.5 MG: 0.5 INHALANT RESPIRATORY (INHALATION) at 19:15

## 2023-09-17 RX ADMIN — LIDOCAINE 1 PATCH: 560 PATCH PERCUTANEOUS; TOPICAL; TRANSDERMAL at 09:15

## 2023-09-17 RX ADMIN — SODIUM CHLORIDE 1000 ML: 9 INJECTION, SOLUTION INTRAVENOUS at 00:24

## 2023-09-17 NOTE — PROGRESS NOTES
[facility] Medicine Services  PROGRESS NOTE    Patient Name: Angela Diaz Parent  : 1950  MRN: [id number]    Date of Admission: 2023  Primary Care Physician: Rosalba Howard APRN    Subjective   Subjective     CC:   Follow-up SOA    HPI:   Patient seen and examined. Low BP overnight. Suspect secondary to Seroquel and Tizanidine co-administration. States she was asymptomatic. Also concerned about weight loss. Has lost 30 pounds in the past year despite good appetite. States she is scheduled for outpatient GB US, asks if that can be done here.     Objective   Objective     Vital Signs:   Temp:  [97.6 °F (36.4 °C)-98.9 °F (37.2 °C)] 98.5 °F (36.9 °C)  Heart Rate:  [] 98  Resp:  [16-18] 18  BP: ()/(48-97) 167/89  Flow (L/min):  [2-3] 2     Physical Exam:  Constitutional: No acute distress, awake, alert, chronically ill appearing, frail appearing   HENT: NCAT, mucous membranes moist  Respiratory: Nonlabored respirations, wheezing improved today, chest wall TTP   Cardiovascular: RRR, no murmurs, rubs, or gallops  Gastrointestinal: Positive bowel sounds, soft, nontender, nondistended  Musculoskeletal: No bilateral ankle edema  Psychiatric: Appropriate affect, cooperative  Neurologic: Nonfocal  Skin: No rashes     Results Reviewed:  LAB RESULTS:      Lab 23  1232   WBC 14.26* 15.38* 19.20*   HEMOGLOBIN 10.7* 11.4* 12.9   HEMATOCRIT 32.5* 32.7* 37.8   PLATELETS 473* 482* 555*   NEUTROS ABS 11.68*  --  15.58*   IMMATURE GRANS (ABS) 0.37*  --  0.30*   LYMPHS ABS 1.51  --  1.70   MONOS ABS 0.67  --  1.55*   EOS ABS 0.00  --  0.03   MCV 95.9 93.2 93.8   PROCALCITONIN  --   --  0.22   LACTATE  --   --  1.4         Lab 23  1232   SODIUM 129* 127* 125*   POTASSIUM 4.8 4.8 4.7   CHLORIDE 97* 90* 86*   CO2 22.0 25.0 28.0   ANION GAP 10.0 12.0 11.0   BUN 30* 13 10   CREATININE 0.78 0.56* 0.57   EGFR 79.8 95.9  95.5   GLUCOSE 216* 99 120*   CALCIUM 9.0 9.2 9.4   HEMOGLOBIN A1C  --  5.70*  --    TSH  --  0.665  --          Lab 09/13/23  1232   TOTAL PROTEIN 7.3   ALBUMIN 3.8   GLOBULIN 3.5   ALT (SGPT) 15   AST (SGOT) 25   BILIRUBIN 0.3   ALK PHOS 148*         Lab 09/13/23  1232   PROBNP 565.0   HSTROP T 21*         Lab 09/14/23  0308   CHOLESTEROL 152   LDL CHOL 92   HDL CHOL 45   TRIGLYCERIDES 76             Brief Urine Lab Results  (Last result in the past 365 days)        Color   Clarity   Blood   Leuk Est   Nitrite   Protein   CREAT   Urine HCG        09/13/23 1837 Yellow   Clear   Negative   Negative   Negative   100 mg/dL (2+)                   Microbiology Results Abnormal       Procedure Component Value - Date/Time    Blood Culture - Blood, Arm, Right [[id number]]  (Normal) Collected: 09/13/23 1309    Lab Status: Preliminary result Specimen: Blood from Arm, Right Updated: 09/16/23 1401     Blood Culture No growth at 3 days    Blood Culture - Blood, Wrist, Left [[id number]]  (Normal) Collected: 09/13/23 1313    Lab Status: Preliminary result Specimen: Blood from Wrist, Left Updated: 09/16/23 1401     Blood Culture No growth at 3 days    Respiratory Culture - Sputum, Cough [[id number]] Collected: 09/15/23 1806    Lab Status: Final result Specimen: Sputum from Cough Updated: 09/15/23 1903     Respiratory Culture Rejected     Gram Stain Moderate (3+) Epithelial cells per low power field      Moderate (3+) WBCs per low power field      Many (4+) Gram positive cocci in chains      Few (2+) Gram negative bacilli    Narrative:      Specimen rejected due to oropharyngeal contamination. Please reorder and recollect specimen if clinically necessary.    Respiratory Panel PCR w/COVID-19(SARS-CoV-2) ALEXANDER/GHANSHYAM/LAURENT/PAD/COR/MAD/ZULLY In-House, NP Swab in UTM/VTM, 3-4 HR TAT - Swab, Nasopharynx [[id number]]  (Normal) Collected: 09/15/23 0545    Lab Status: Final result Specimen: Swab from Nasopharynx Updated: 09/15/23 0635     ADENOVIRUS,  PCR Not Detected     Coronavirus 229E Not Detected     Coronavirus HKU1 Not Detected     Coronavirus NL63 Not Detected     Coronavirus OC43 Not Detected     COVID19 Not Detected     Human Metapneumovirus Not Detected     Human Rhinovirus/Enterovirus Not Detected     Influenza A PCR Not Detected     Influenza B PCR Not Detected     Parainfluenza Virus 1 Not Detected     Parainfluenza Virus 2 Not Detected     Parainfluenza Virus 3 Not Detected     Parainfluenza Virus 4 Not Detected     RSV, PCR Not Detected     Bordetella pertussis pcr Not Detected     Bordetella parapertussis PCR Not Detected     Chlamydophila pneumoniae PCR Not Detected     Mycoplasma pneumo by PCR Not Detected    Narrative:      In the setting of a positive respiratory panel with a viral infection PLUS a negative procalcitonin without other underlying concern for bacterial infection, consider observing off antibiotics or discontinuation of antibiotics and continue supportive care. If the respiratory panel is positive for atypical bacterial infection (Bordetella pertussis, Chlamydophila pneumoniae, or Mycoplasma pneumoniae), consider antibiotic de-escalation to target atypical bacterial infection.    MRSA Screen, PCR (Inpatient) - Swab, Nares [[id number]]  (Normal) Collected: 09/14/23 0601    Lab Status: Final result Specimen: Swab from Nares Updated: 09/14/23 0910     MRSA PCR Negative    Narrative:      The negative predictive value of this diagnostic test is high and should only be used to consider de-escalating anti-MRSA therapy. A positive result may indicate colonization with MRSA and must be correlated clinically.  MRSA Negative    Legionella Antigen, Urine - Urine, Urine, Clean Catch [[id number]]  (Normal) Collected: 09/13/23 1837    Lab Status: Final result Specimen: Urine, Clean Catch Updated: 09/14/23 0004     LEGIONELLA ANTIGEN, URINE Negative    S. Pneumo Ag Urine or CSF - Urine, Urine, Clean Catch [[id number]]  (Normal) Collected:  09/13/23 1837    Lab Status: Final result Specimen: Urine, Clean Catch Updated: 09/14/23 0004     Strep Pneumo Ag Negative    COVID PRE-OP / PRE-PROCEDURE SCREENING ORDER (NO ISOLATION) - Swab, Nasopharynx [[id number]]  (Normal) Collected: 09/13/23 1314    Lab Status: Final result Specimen: Swab from Nasopharynx Updated: 09/13/23 1417    Narrative:      The following orders were created for panel order COVID PRE-OP / PRE-PROCEDURE SCREENING ORDER (NO ISOLATION) - Swab, Nasopharynx.  Procedure                               Abnormality         Status                     ---------                               -----------         ------                     COVID-19 and FLU A/B PCR...[[id number]]  Normal              Final result                 Please view results for these tests on the individual orders.    COVID-19 and FLU A/B PCR - Swab, Nasopharynx [[id number]]  (Normal) Collected: 09/13/23 1314    Lab Status: Final result Specimen: Swab from Nasopharynx Updated: 09/13/23 1417     COVID19 Not Detected     Influenza A PCR Not Detected     Influenza B PCR Not Detected    Narrative:      Fact sheet for providers: https://www.fda.gov/media/295228/download    Fact sheet for patients: https://www.fda.gov/media/553417/download    Test performed by PCR.            No radiology results from the last 24 hrs    Results for orders placed during the hospital encounter of 01/03/19    Adult Transthoracic Echo Complete W/ Cont if Necessary Per Protocol    Interpretation Summary  · Left ventricular systolic function is normal.  · Estimated EF appears to be in the range of 61 - 65%.  · Mild aortic valve regurgitation is present.      Current medications:  Scheduled Meds:amLODIPine, 10 mg, Oral, Q24H  budesonide, 0.5 mg, Nebulization, BID - RT  doxycycline, 100 mg, Oral, Q12H  heparin (porcine), 5,000 Units, Subcutaneous, Q8H  ipratropium-albuterol, 3 mL, Nebulization, Q6H - RT  Lidocaine, 1 patch, Transdermal, Q24H  methylPREDNISolone  sodium succinate, 40 mg, Intravenous, Q8H  mirtazapine, 30 mg, Oral, Nightly  OXcarbazepine, 600 mg, Oral, Nightly  pharmacy consult - MTM, , Does not apply, Daily  polyethylene glycol, 17 g, Oral, Daily  QUEtiapine, 100 mg, Oral, Nightly  sodium chloride, 10 mL, Intravenous, Q12H  tiZANidine, 8 mg, Oral, Nightly  vitamin B-12, 500 mcg, Oral, Daily      Continuous Infusions:     PRN Meds:.  acetaminophen **OR** acetaminophen **OR** acetaminophen    albuterol sulfate HFA    benzonatate    Calcium Replacement - Follow Nurse / BPA Driven Protocol    hydrALAZINE    HYDROcodone-acetaminophen    ipratropium-albuterol    Magnesium Standard Dose Replacement - Follow Nurse / BPA Driven Protocol    nitroglycerin    ondansetron **OR** ondansetron    Phosphorus Replacement - Follow Nurse / BPA Driven Protocol    Potassium Replacement - Follow Nurse / BPA Driven Protocol    sodium chloride    sodium chloride    sodium chloride    Assessment & Plan   Assessment & Plan     Active Hospital Problems    Diagnosis  POA    **COPD with acute exacerbation [J44.1]  Yes    Severe malnutrition [E43]  Yes    Hyponatremia [E87.1]  Unknown    Leukocytosis [D72.829]  Unknown    Acute-on-chronic respiratory failure [J96.20]  Yes    History of tobacco use [Z87.891]  Not Applicable    Hepatitis C antibody test positive [R76.8]  Yes    MINDI (generalized anxiety disorder) [F41.1]  Yes    Chronic constipation [K59.09]  Yes    MS (multiple sclerosis) [G35]  Yes    Essential hypertension [I10]  Yes      Resolved Hospital Problems   No resolved problems to display.        Brief Hospital Course to date:  Ms. Warren is a 73 yo WF prior smoker (15 pack year history, quit in August 2023) with PMH of HTN, MS, depression, Hep C  and COPD not on supplemental O2 who presented with acute COPD exacerbation.     This patient's problems and plans were partially entered by my partner and updated as appropriate by me 09/17/23.     Acute COPD  exacerbation  Leukocytosis  -- not on supplemental O2 at baseline, currently requiring 2-3L NC, wean as appropriate, anticipate she may need home O2 at discharge  -- CXR on admission with no acute process. 2-view CXR this AM, pending   -- Legionella and strep pneumo urinary antigens are negative, COVID and flu were negative on admission  --Continue DuoNebs, doxycycline and IV Solu-Medrol (40mg IV q8, will wean tomorrow )  -- scheduled and PRN duonebs  --continue Pulmicort Nebs   --Lidoderm patch to chest wall      Hyponatremia  -- Suspect this to be hypovolemic hyponatremia from poor p.o. intake  -- Slowly improving      MS  -- follows with Dr. Petit  -- continue home meds  -- uses walker, cane and wheelchair intermittently at home  -- PT/OT recommends rehab, patient agreeable, CM following, referrals sent     Hypertension  -- not on any home meds  -- Continue Amlodipine 10mg daily  -- PRN Hydralazine      Chronic Constipation  --continue Miralax     H/o Hep C ab+     H/o Tobacco Abuse  -- smoked 1/2 ppd for 30 years and quit last month  -- encouraged continued cessation     Hypotension overnight  -Suspect secondary to co-administration of Seroquel and muscle relaxer. She takes them together at home and no issues but also doesn't check BP. She was asymptomatic during low BP event  -Change timing of medications    Weight loss  -30 pounds in a year despite good appetite   -Colonoscopy  11/23/2021: single polyp removed (tubular adenoma) from transverse colon, recommend repeat scope in 3 years   -CT A/P 3/2/22: mild diffuse intra and extra hepatic biliary ductal dilatation  -LFTs normal   -has appt for outpatient GB US, will perform in AM  -If negative, will order MRCP abdomen/pelvis  -Has also not had a recent CT chest (with Hx smoking and weight loss), need to rule out underlying lung malignancy     Expected Discharge Location and Transportation: Rehab  Expected Discharge   Expected Discharge Date: 9/19/2023;  Expected Discharge Time:      DVT prophylaxis:  Medical DVT prophylaxis orders are present.     AM-PAC 6 Clicks Score (PT): 17 (09/14/23 2026)    CODE STATUS:   Code Status and Medical Interventions:   Ordered at: 09/13/23 1621     Level Of Support Discussed With:    Patient     Code Status (Patient has no pulse and is not breathing):    CPR (Attempt to Resuscitate)     Medical Interventions (Patient has pulse or is breathing):    Full Support       Mari Paredes, DO  09/17/23

## 2023-09-17 NOTE — PLAN OF CARE
Goal Outcome Evaluation:   Patient vital signs stable and on 2L of oxygen via nasal cannula. Patient denies pain, nausea, and shortness of air. Fall and safety precautions maintained.

## 2023-09-18 ENCOUNTER — APPOINTMENT (OUTPATIENT)
Dept: ULTRASOUND IMAGING | Facility: HOSPITAL | Age: 73
DRG: 190 | End: 2023-09-18
Payer: MEDICARE

## 2023-09-18 LAB
ALBUMIN SERPL-MCNC: 3.4 G/DL (ref 3.5–5.2)
ALBUMIN/GLOB SERPL: 1.2 G/DL
ALP SERPL-CCNC: 106 U/L (ref 39–117)
ALT SERPL W P-5'-P-CCNC: 76 U/L (ref 1–33)
ANION GAP SERPL CALCULATED.3IONS-SCNC: 10 MMOL/L (ref 5–15)
AST SERPL-CCNC: 43 U/L (ref 1–32)
BACTERIA SPEC AEROBE CULT: NORMAL
BACTERIA SPEC AEROBE CULT: NORMAL
BILIRUB SERPL-MCNC: 0.2 MG/DL (ref 0–1.2)
BUN SERPL-MCNC: 30 MG/DL (ref 8–23)
BUN/CREAT SERPL: 42.9 (ref 7–25)
CALCIUM SPEC-SCNC: 9.2 MG/DL (ref 8.6–10.5)
CHLORIDE SERPL-SCNC: 97 MMOL/L (ref 98–107)
CO2 SERPL-SCNC: 23 MMOL/L (ref 22–29)
CREAT SERPL-MCNC: 0.7 MG/DL (ref 0.57–1)
DEPRECATED RDW RBC AUTO: 46.8 FL (ref 37–54)
EGFRCR SERPLBLD CKD-EPI 2021: 90.9 ML/MIN/1.73
ERYTHROCYTE [DISTWIDTH] IN BLOOD BY AUTOMATED COUNT: 13.6 % (ref 12.3–15.4)
GLOBULIN UR ELPH-MCNC: 2.8 GM/DL
GLUCOSE SERPL-MCNC: 134 MG/DL (ref 65–99)
HCT VFR BLD AUTO: 33.3 % (ref 34–46.6)
HGB BLD-MCNC: 11.3 G/DL (ref 12–15.9)
MCH RBC QN AUTO: 31.7 PG (ref 26.6–33)
MCHC RBC AUTO-ENTMCNC: 33.9 G/DL (ref 31.5–35.7)
MCV RBC AUTO: 93.3 FL (ref 79–97)
PLATELET # BLD AUTO: 524 10*3/MM3 (ref 140–450)
PMV BLD AUTO: 9 FL (ref 6–12)
POTASSIUM SERPL-SCNC: 5.3 MMOL/L (ref 3.5–5.2)
PROT SERPL-MCNC: 6.2 G/DL (ref 6–8.5)
RBC # BLD AUTO: 3.57 10*6/MM3 (ref 3.77–5.28)
SODIUM SERPL-SCNC: 130 MMOL/L (ref 136–145)
WBC NRBC COR # BLD: 16.44 10*3/MM3 (ref 3.4–10.8)

## 2023-09-18 PROCEDURE — 97110 THERAPEUTIC EXERCISES: CPT

## 2023-09-18 PROCEDURE — 99232 SBSQ HOSP IP/OBS MODERATE 35: CPT | Performed by: FAMILY MEDICINE

## 2023-09-18 PROCEDURE — 76705 ECHO EXAM OF ABDOMEN: CPT

## 2023-09-18 PROCEDURE — 25010000002 HEPARIN (PORCINE) PER 1000 UNITS: Performed by: INTERNAL MEDICINE

## 2023-09-18 PROCEDURE — 94664 DEMO&/EVAL PT USE INHALER: CPT

## 2023-09-18 PROCEDURE — 85027 COMPLETE CBC AUTOMATED: CPT | Performed by: FAMILY MEDICINE

## 2023-09-18 PROCEDURE — 94799 UNLISTED PULMONARY SVC/PX: CPT

## 2023-09-18 PROCEDURE — 25010000002 METHYLPREDNISOLONE PER 40 MG: Performed by: FAMILY MEDICINE

## 2023-09-18 PROCEDURE — 80053 COMPREHEN METABOLIC PANEL: CPT | Performed by: FAMILY MEDICINE

## 2023-09-18 PROCEDURE — 97530 THERAPEUTIC ACTIVITIES: CPT

## 2023-09-18 RX ORDER — METHYLPREDNISOLONE SODIUM SUCCINATE 40 MG/ML
40 INJECTION, POWDER, LYOPHILIZED, FOR SOLUTION INTRAMUSCULAR; INTRAVENOUS EVERY 12 HOURS
Status: DISCONTINUED | OUTPATIENT
Start: 2023-09-18 | End: 2023-09-19

## 2023-09-18 RX ADMIN — AMLODIPINE BESYLATE 10 MG: 10 TABLET ORAL at 09:58

## 2023-09-18 RX ADMIN — TIZANIDINE 4 MG: 4 TABLET ORAL at 22:08

## 2023-09-18 RX ADMIN — HEPARIN SODIUM 5000 UNITS: 5000 INJECTION, SOLUTION INTRAVENOUS; SUBCUTANEOUS at 22:09

## 2023-09-18 RX ADMIN — METHYLPREDNISOLONE SODIUM SUCCINATE 40 MG: 40 INJECTION, POWDER, LYOPHILIZED, FOR SOLUTION INTRAMUSCULAR; INTRAVENOUS at 22:09

## 2023-09-18 RX ADMIN — POLYETHYLENE GLYCOL 3350 17 G: 17 POWDER, FOR SOLUTION ORAL at 09:59

## 2023-09-18 RX ADMIN — HYDROCODONE BITARTRATE AND ACETAMINOPHEN 1 TABLET: 10; 325 TABLET ORAL at 12:33

## 2023-09-18 RX ADMIN — MIRTAZAPINE 30 MG: 15 TABLET, FILM COATED ORAL at 22:09

## 2023-09-18 RX ADMIN — BUDESONIDE 0.5 MG: 0.5 INHALANT RESPIRATORY (INHALATION) at 07:07

## 2023-09-18 RX ADMIN — OXCARBAZEPINE 600 MG: 150 TABLET, FILM COATED ORAL at 22:08

## 2023-09-18 RX ADMIN — LIDOCAINE 1 PATCH: 560 PATCH PERCUTANEOUS; TOPICAL; TRANSDERMAL at 09:59

## 2023-09-18 RX ADMIN — METHYLPREDNISOLONE SODIUM SUCCINATE 40 MG: 40 INJECTION, POWDER, LYOPHILIZED, FOR SOLUTION INTRAMUSCULAR; INTRAVENOUS at 10:00

## 2023-09-18 RX ADMIN — BUDESONIDE 0.5 MG: 0.5 INHALANT RESPIRATORY (INHALATION) at 19:20

## 2023-09-18 RX ADMIN — DOXYCYCLINE 100 MG: 100 CAPSULE ORAL at 09:58

## 2023-09-18 RX ADMIN — IPRATROPIUM BROMIDE AND ALBUTEROL SULFATE 3 ML: 2.5; .5 SOLUTION RESPIRATORY (INHALATION) at 19:19

## 2023-09-18 RX ADMIN — Medication 10 ML: at 09:59

## 2023-09-18 RX ADMIN — QUETIAPINE FUMARATE 100 MG: 100 TABLET ORAL at 22:09

## 2023-09-18 RX ADMIN — DOXYCYCLINE 100 MG: 100 CAPSULE ORAL at 22:09

## 2023-09-18 RX ADMIN — CYANOCOBALAMIN TAB 1000 MCG 500 MCG: 1000 TAB at 09:58

## 2023-09-18 RX ADMIN — Medication 10 ML: at 22:10

## 2023-09-18 RX ADMIN — SODIUM ZIRCONIUM CYCLOSILICATE 10 G: 10 POWDER, FOR SUSPENSION ORAL at 14:07

## 2023-09-18 RX ADMIN — IPRATROPIUM BROMIDE AND ALBUTEROL SULFATE 3 ML: 2.5; .5 SOLUTION RESPIRATORY (INHALATION) at 07:07

## 2023-09-18 RX ADMIN — IPRATROPIUM BROMIDE AND ALBUTEROL SULFATE 3 ML: 2.5; .5 SOLUTION RESPIRATORY (INHALATION) at 12:07

## 2023-09-18 RX ADMIN — HEPARIN SODIUM 5000 UNITS: 5000 INJECTION, SOLUTION INTRAVENOUS; SUBCUTANEOUS at 14:07

## 2023-09-18 RX ADMIN — HYDROCODONE BITARTRATE AND ACETAMINOPHEN 1 TABLET: 10; 325 TABLET ORAL at 22:35

## 2023-09-18 NOTE — THERAPY TREATMENT NOTE
Patient Name: Angela Diaz Parent  : 1950    MRN: [id number]                              Today's Date: 2023       Admit Date: 2023    Visit Dx:     ICD-10-CM ICD-9-CM   1. Acute on chronic respiratory failure with hypoxia  J96.21 518.84     799.02   2. COPD exacerbation  J44.1 491.21   3. Hyponatremia  E87.1 276.1   4. Bandemia  D72.825 288.66     Patient Active Problem List   Diagnosis    Essential hypertension    Palpitations    Other forms of angina pectoris    CKD (chronic kidney disease) stage 2, GFR 60-89 ml/min    MS (multiple sclerosis)    Mild aortic regurgitation    Chronic constipation    MINDI (generalized anxiety disorder)    Polyp of colon    Hepatitis C antibody test positive    Neuropathic pain    Prediabetes    GERD without esophagitis    Hyperglycemia    Closed trimalleolar fracture, left, initial encounter    History of tobacco use    Weight loss    Chronic fatigue    Cough    Lower abdominal pain    Reactive depression    Medication monitoring encounter    Anxiety    Left lower quadrant abdominal pain    Impaired mobility    Bilious vomiting with nausea    COPD (chronic obstructive pulmonary disease)    Right upper quadrant pain    COPD with acute exacerbation    Hyponatremia    Leukocytosis    Acute-on-chronic respiratory failure    Severe malnutrition     Past Medical History:   Diagnosis Date    Acid reflux     Anxiety     Anxiety     Arthritis     Cataract     CKD (chronic kidney disease) stage 2, GFR 60-89 ml/min 2018    Colon polyp     Depression     GERD (gastroesophageal reflux disease)     Headache     Heart murmur     History of blood transfusion 1971    after surgery     Hypertension 2018    Infectious viral hepatitis     Mild aortic regurgitation 2019    MS (multiple sclerosis)     Pneumonia     Prediabetes 2019     Past Surgical History:   Procedure Laterality Date    ANKLE OPEN REDUCTION INTERNAL FIXATION Left 2019    Procedure: ANKLE OPEN  REDUCTION INTERNAL FIXATION;  Surgeon: Stan Lozoya MD;  Location: [facility];  Service: Orthopedics    CATARACT EXTRACTION  2009    CERVICAL BIOPSY  1971    benign    COLONOSCOPY      HEMORRHOIDECTOMY  1971    TUBAL ABDOMINAL LIGATION  1985      General Information       Row Name 09/18/23 1437          Physical Therapy Time and Intention    Document Type therapy note (daily note)  -ML     Mode of Treatment physical therapy  -ML       Row Name 09/18/23 1437          General Information    Patient Profile Reviewed yes  -ML     Existing Precautions/Restrictions fall;oxygen therapy device and L/min  -ML     Barriers to Rehab medically complex;previous functional deficit  -ML       Row Name 09/18/23 1437          Cognition    Orientation Status (Cognition) oriented x 3  -ML       Row Name 09/18/23 1437          Safety Issues, Functional Mobility    Safety Issues Affecting Function (Mobility) awareness of need for assistance;insight into deficits/self-awareness;safety precaution awareness;safety precautions follow-through/compliance;sequencing abilities  -ML     Impairments Affecting Function (Mobility) balance;endurance/activity tolerance;pain;shortness of breath;strength  -ML               User Key  (r) = Recorded By, (t) = Taken By, (c) = Cosigned By      Initials Name Provider Type    ML Nicole Potrer Physical Therapist                   Mobility       Row Name 09/18/23 1438          Bed Mobility    Bed Mobility sit-supine  -ML     Sit-Supine [place] (Bed Mobility) standby assist  -ML     Assistive Device (Bed Mobility) bed rails  -       Row Name 09/18/23 1438          Sit-Stand Transfer    Sit-Stand [place] (Transfers) verbal cues;contact guard  -ML     Assistive Device (Sit-Stand Transfers) walker, front-wheeled  -ML       Row Name 09/18/23 1438          Gait/Stairs (Locomotion)    [place] Level (Gait) minimum assist (75% patient effort);1 person assist  -ML     Assistive Device (Gait)  walker, front-wheeled  -ML     Distance in Feet (Gait) 15  -ML     Deviations/Abnormal Patterns (Gait) jv decreased;festinating/shuffling;gait speed decreased;stride length decreased;base of support, narrow;bilateral deviations  -ML     Bilateral Gait Deviations forward flexed posture;heel strike decreased  -ML     Left Sided Gait Deviations weight shift ability decreased  -ML               User Key  (r) = Recorded By, (t) = Taken By, (c) = Cosigned By      Initials Name Provider Type    ML Nicole Porter Physical Therapist                   Obj/Interventions       Row Name 09/18/23 [facility]9          Motor Skills    Therapeutic Exercise hip;knee;ankle  -ML       Row Name 09/18/23 [facility]9          Hip (Therapeutic Exercise)    Hip (Therapeutic Exercise) strengthening exercise  -ML     Hip Strengthening (Therapeutic Exercise) bilateral;aBduction;aDduction;marching while seated;15 repititions  -ML       Row Name 09/18/23 [facility]9          Knee (Therapeutic Exercise)    Knee (Therapeutic Exercise) strengthening exercise  -ML     Knee Strengthening (Therapeutic Exercise) bilateral;SLR (straight leg raise);LAQ (long arc quad);10 repetitions;2 sets  -ML       Row Name 09/18/23 [facility]9          Ankle (Therapeutic Exercise)    Ankle (Therapeutic Exercise) AROM (active range of motion)  -ML     Ankle AROM (Therapeutic Exercise) bilateral;dorsiflexion;plantarflexion;10 repetitions  -ML       Row Name 09/18/23 [facility]9          Balance    Balance Assessment sitting static balance;sitting dynamic balance;sit to stand dynamic balance;standing static balance;standing dynamic balance  -ML     Static Sitting Balance standby assist  -ML     Dynamic Sitting Balance supervision  -ML     Position, Sitting Balance unsupported;sitting edge of bed  -ML     Sit to Stand Dynamic Balance contact guard  -ML     Static Standing Balance contact guard  -ML     Dynamic Standing Balance minimal assist;1-person assist  -ML     Position/Device Used, Standing  Balance supported;walker, front-wheeled  -ML     Balance Interventions sitting;standing;sit to stand;supported;occupation based/functional task  -ML               User Key  (r) = Recorded By, (t) = Taken By, (c) = Cosigned By      Initials Name Provider Type    Nicole Pabon Physical Therapist                   Goals/Plan    No documentation.                  Clinical Impression       Row Name 09/18/23 1441          Pain    Pretreatment Pain Rating 5/10  -ML     Posttreatment Pain Rating 5/10  -ML     Pain Location generalized  -ML     Pain Location - chest;back  -ML     Pain Intervention(s) Repositioned;Ambulation/increased activity;Rest  -ML       Row Name 09/18/23 1441          Plan of Care Review    Plan of Care Reviewed With patient  -ML     Progress improving  -ML     Outcome Evaluation Patient increased ambulation distance compared to previous treatment session and required less assistance with transfers. The patient continues to present below baseline for mobility and at increased risk for falls. The patient would continue to benefit from skilled PT to address strength, balance and activity tolerance deficits. Continue current PT POC.  -ML       Row Name 09/18/23 1441          Vital Signs    Pretreatment Heart Rate (beats/min) 97  -ML     Intratreatment Heart Rate (beats/min) 120  -ML     Posttreatment Heart Rate (beats/min) 110  -ML     Pre SpO2 (%) 94  -ML     O2 Delivery Pre Treatment nasal cannula  -ML     Post SpO2 (%) 93  -ML     O2 Delivery Post Treatment nasal cannula  -ML     Pre Patient Position Sitting  -ML     Intra Patient Position Standing  -ML     Post Patient Position Supine  -ML       Row Name 09/18/23 1441          Positioning and Restraints    Pre-Treatment Position sitting in chair/recliner  -ML     Post Treatment Position bed  -ML     In Bed notified nsg;fowlers;call light within reach;encouraged to call for assist;exit alarm on;side rails up x2  -ML               User Key  (r) =  Recorded By, (t) = Taken By, (c) = Cosigned By      Initials Name Provider Type    Nicole Pabon Physical Therapist                   Outcome Measures       Row Name 09/18/23 1443          How much help from another person do you currently need...    Turning from your back to your side while in flat bed without using bedrails? 4  -ML     Moving from lying on back to sitting on the side of a flat bed without bedrails? 3  -ML     Moving to and from a bed to a chair (including a wheelchair)? 3  -ML     Standing up from a chair using your arms (e.g., wheelchair, bedside chair)? 3  -ML     Climbing 3-5 steps with a railing? 2  -ML     To walk in hospital room? 3  -ML     AM-PAC 6 Clicks Score (PT) 18  -ML     Highest level of mobility 6 --> Walked 10 steps or more  -ML       Row Name 09/18/23 1443          Functional Assessment    Outcome Measure Options AM-PAC 6 Clicks Basic Mobility (PT)  -ML               User Key  (r) = Recorded By, (t) = Taken By, (c) = Cosigned By      Initials Name Provider Type    Nicole Pabon Physical Therapist                                 Physical Therapy Education       Title: PT OT SLP Therapies (In Progress)       Topic: Physical Therapy (Done)       Point: Mobility training (Done)       Learning Progress Summary             Patient Acceptance, E, VU,DU by  at 9/18/2023 1444    Acceptance, E, VU,NR by NS at 9/14/2023 1022                         Point: Home exercise program (Done)       Learning Progress Summary             Patient Acceptance, E, VU,DU by  at 9/18/2023 1444                         Point: Body mechanics (Done)       Learning Progress Summary             Patient Acceptance, E, VU,NR by NS at 9/14/2023 1022                         Point: Precautions (Done)       Learning Progress Summary             Patient Acceptance, E, VU,DU by  at 9/18/2023 1444    Acceptance, E, VU,NR by NS at 9/14/2023 1022                                         User Key       Initials  Effective Dates Name Provider Type Discipline    NS 06/16/21 -  Courtney Vazquez, PT Physical Therapist PT     04/22/21 -  Nicole Porter Physical Therapist PT                  PT Recommendation and Plan     Plan of Care Reviewed With: patient  Progress: improving  Outcome Evaluation: Patient increased ambulation distance compared to previous treatment session and required less assistance with transfers. The patient continues to present below baseline for mobility and at increased risk for falls. The patient would continue to benefit from skilled PT to address strength, balance and activity tolerance deficits. Continue current PT POC.     Time Calculation:         PT Charges       Row Name 09/18/23 1445             Time Calculation    Start Time 1403  -ML      PT Received On 09/18/23  -ML         Timed Charges    40863 - PT Therapeutic Exercise Minutes 15  -ML      54038 - PT Therapeutic Activity Minutes 14  -ML         Total Minutes    Timed Charges Total Minutes 29  -ML       Total Minutes 29  -ML                User Key  (r) = Recorded By, (t) = Taken By, (c) = Cosigned By      Initials Name Provider Type     Nicole Porter Physical Therapist                  Therapy Charges for Today       Code Description Service Date Service Provider Modifiers Qty    61952733310 HC PT THER PROC EA 15 MIN 9/18/2023 Nicole Porter GP 1    85281577266 HC PT THERAPEUTIC ACT EA 15 MIN 9/18/2023 Nicole Porter GP 1            PT G-Codes  Outcome Measure Options: AM-PAC 6 Clicks Basic Mobility (PT)  AM-PAC 6 Clicks Score (PT): 18  AM-PAC 6 Clicks Score (OT): 19  PT Discharge Summary  Anticipated Discharge Disposition (PT): inpatient rehabilitation facility    Nicole Porter  9/18/2023

## 2023-09-18 NOTE — PROGRESS NOTES
NN spoke with pt at .  Pt alert and able to answer questions appropriately. Pt O2 sat  95% on  2 L currently, home O2 use  3L. Patient awaits rehab placement. Deep breathing exercises encouraged. No new concerns or questions voiced at this time.  NN will continue to follow as needed.       Per current [employer] Standards, please consider: No LAMA/LABA/ICS in place, Outpatient PFT, Rehab as appropriate, Palliative Care consult, NRT at [place], Annual LDCT per current screening guidelines (age 50-80 years old, smoking history of 20 pack years or more or has quit within past 15 years)

## 2023-09-18 NOTE — PLAN OF CARE
Goal Outcome Evaluation:  Plan of Care Reviewed With: patient        Progress: improving  Outcome Evaluation: Patient increased ambulation distance compared to previous treatment session and required less assistance with transfers. The patient continues to present below baseline for mobility and at increased risk for falls. The patient would continue to benefit from skilled PT to address strength, balance and activity tolerance deficits. Continue current PT POC.      Anticipated Discharge Disposition (PT): inpatient rehabilitation facility

## 2023-09-18 NOTE — PROGRESS NOTES
[facility] Medicine Services  PROGRESS NOTE    Patient Name: Angela Diaz Parent  : [id number]  MRN: [id number]    Date of Admission: 2023  Primary Care Physician: Rosalba Howard APRN    Subjective   Subjective     CC:   Follow-up SOA    HPI:   Patient seen and examined. No issues overnight. She would like to get to rehab soon. Tearful when talking about her horses she use to have.     Objective   Objective     Vital Signs:   Temp:  [97.8 °F (36.6 °C)-98.3 °F (36.8 °C)] 98.3 °F (36.8 °C)  Heart Rate:  [] 77  Resp:  [18-20] 18  BP: (111-177)/(66-92) 171/87  Flow (L/min):  [2] 2     Physical Exam:  Constitutional: No acute distress, awake, alert, chronically ill appearing, frail appearing   HENT: NCAT, mucous membranes moist  Respiratory: Decreased in bases, wheezing overall improved, 2L NC  Cardiovascular: RRR, no murmurs, rubs, or gallops  Gastrointestinal: Positive bowel sounds, soft, nontender, nondistended  Musculoskeletal: No bilateral ankle edema  Psychiatric: Appropriate affect, cooperative  Neurologic: Nonfocal  Skin: No rashes     Results Reviewed:  LAB RESULTS:      Lab 23  1232   WBC 16.44* 14.26* 15.38* 19.20*   HEMOGLOBIN 11.3* 10.7* 11.4* 12.9   HEMATOCRIT 33.3* 32.5* 32.7* 37.8   PLATELETS 524* 473* 482* 555*   NEUTROS ABS  --  11.68*  --  15.58*   IMMATURE GRANS (ABS)  --  0.37*  --  0.30*   LYMPHS ABS  --  1.51  --  1.70   MONOS ABS  --  0.67  --  1.55*   EOS ABS  --  0.00  --  0.03   MCV 93.3 95.9 93.2 93.8   PROCALCITONIN  --   --   --  0.22   LACTATE  --   --   --  1.4         Lab 23  1232   SODIUM 130* 129* 127* 125*   POTASSIUM 5.3* 4.8 4.8 4.7   CHLORIDE 97* 97* 90* 86*   CO2 23.0 22.0 25.0 28.0   ANION GAP 10.0 10.0 12.0 11.0   BUN 30* 30* 13 10   CREATININE 0.70 0.78 0.56* 0.57   EGFR 90.9 79.8 95.9 95.5   GLUCOSE 134* 216* 99 120*   CALCIUM 9.2 9.0  9.2 9.4   HEMOGLOBIN A1C  --   --  5.70*  --    TSH  --   --  0.665  --          Lab 09/18/23  0405 09/13/23  1232   TOTAL PROTEIN 6.2 7.3   ALBUMIN 3.4* 3.8   GLOBULIN 2.8 3.5   ALT (SGPT) 76* 15   AST (SGOT) 43* 25   BILIRUBIN 0.2 0.3   ALK PHOS 106 148*         Lab 09/13/23  1232   PROBNP 565.0   HSTROP T 21*         Lab 09/14/23  0308   CHOLESTEROL 152   LDL CHOL 92   HDL CHOL 45   TRIGLYCERIDES 76             Brief Urine Lab Results  (Last result in the past 365 days)        Color   Clarity   Blood   Leuk Est   Nitrite   Protein   CREAT   Urine HCG        09/13/23 1837 Yellow   Clear   Negative   Negative   Negative   100 mg/dL (2+)                   Microbiology Results Abnormal       Procedure Component Value - Date/Time    Blood Culture - Blood, Arm, Right [[id number]]  (Normal) Collected: 09/13/23 1309    Lab Status: Preliminary result Specimen: Blood from Arm, Right Updated: 09/17/23 1400     Blood Culture No growth at 4 days    Blood Culture - Blood, Wrist, Left [[id number]]  (Normal) Collected: 09/13/23 1313    Lab Status: Preliminary result Specimen: Blood from Wrist, Left Updated: 09/17/23 1400     Blood Culture No growth at 4 days    Respiratory Culture - Sputum, Cough [[id number]] Collected: 09/15/23 1806    Lab Status: Final result Specimen: Sputum from Cough Updated: 09/15/23 1903     Respiratory Culture Rejected     Gram Stain Moderate (3+) Epithelial cells per low power field      Moderate (3+) WBCs per low power field      Many (4+) Gram positive cocci in chains      Few (2+) Gram negative bacilli    Narrative:      Specimen rejected due to oropharyngeal contamination. Please reorder and recollect specimen if clinically necessary.    Respiratory Panel PCR w/COVID-19(SARS-CoV-2) ALEXANDER/GHANSHYAM/LAURENT/PAD/COR/MAD/ZULLY In-House, NP Swab in UTM/VTM, 3-4 HR TAT - Swab, Nasopharynx [[id number]]  (Normal) Collected: 09/15/23 0545    Lab Status: Final result Specimen: Swab from Nasopharynx Updated: 09/15/23 06      ADENOVIRUS, PCR Not Detected     Coronavirus 229E Not Detected     Coronavirus HKU1 Not Detected     Coronavirus NL63 Not Detected     Coronavirus OC43 Not Detected     COVID19 Not Detected     Human Metapneumovirus Not Detected     Human Rhinovirus/Enterovirus Not Detected     Influenza A PCR Not Detected     Influenza B PCR Not Detected     Parainfluenza Virus 1 Not Detected     Parainfluenza Virus 2 Not Detected     Parainfluenza Virus 3 Not Detected     Parainfluenza Virus 4 Not Detected     RSV, PCR Not Detected     Bordetella pertussis pcr Not Detected     Bordetella parapertussis PCR Not Detected     Chlamydophila pneumoniae PCR Not Detected     Mycoplasma pneumo by PCR Not Detected    Narrative:      In the setting of a positive respiratory panel with a viral infection PLUS a negative procalcitonin without other underlying concern for bacterial infection, consider observing off antibiotics or discontinuation of antibiotics and continue supportive care. If the respiratory panel is positive for atypical bacterial infection (Bordetella pertussis, Chlamydophila pneumoniae, or Mycoplasma pneumoniae), consider antibiotic de-escalation to target atypical bacterial infection.    MRSA Screen, PCR (Inpatient) - Swab, Nares [[id number]]  (Normal) Collected: 09/14/23 0601    Lab Status: Final result Specimen: Swab from Nares Updated: 09/14/23 0910     MRSA PCR Negative    Narrative:      The negative predictive value of this diagnostic test is high and should only be used to consider de-escalating anti-MRSA therapy. A positive result may indicate colonization with MRSA and must be correlated clinically.  MRSA Negative    Legionella Antigen, Urine - Urine, Urine, Clean Catch [[id number]]  (Normal) Collected: 09/13/23 1837    Lab Status: Final result Specimen: Urine, Clean Catch Updated: 09/14/23 0004     LEGIONELLA ANTIGEN, URINE Negative    S. Pneumo Ag Urine or CSF - Urine, Urine, Clean Catch [[id number]]  (Normal)  Collected: 09/13/23 1837    Lab Status: Final result Specimen: Urine, Clean Catch Updated: 09/14/23 0004     Strep Pneumo Ag Negative    COVID PRE-OP / PRE-PROCEDURE SCREENING ORDER (NO ISOLATION) - Swab, Nasopharynx [[id number]]  (Normal) Collected: 09/13/23 1314    Lab Status: Final result Specimen: Swab from Nasopharynx Updated: 09/13/23 1417    Narrative:      The following orders were created for panel order COVID PRE-OP / PRE-PROCEDURE SCREENING ORDER (NO ISOLATION) - Swab, Nasopharynx.  Procedure                               Abnormality         Status                     ---------                               -----------         ------                     COVID-19 and FLU A/B PCR...[[id number]]  Normal              Final result                 Please view results for these tests on the individual orders.    COVID-19 and FLU A/B PCR - Swab, Nasopharynx [[id number]]  (Normal) Collected: 09/13/23 1314    Lab Status: Final result Specimen: Swab from Nasopharynx Updated: 09/13/23 1417     COVID19 Not Detected     Influenza A PCR Not Detected     Influenza B PCR Not Detected    Narrative:      Fact sheet for providers: https://www.fda.gov/media/705811/download    Fact sheet for patients: https://www.fda.gov/media/377896/download    Test performed by PCR.            CT Chest Without Contrast Diagnostic    Result Date: 9/17/2023  CT CHEST WO CONTRAST DIAGNOSTIC Date of Exam: 9/17/2023 12:55 PM EDT Indication: weight loss, COPD, smoker, rule out malignancy. Comparison: None available. Technique: Axial CT images were obtained of the chest without contrast administration.  Reconstructed coronal and sagittal images were also obtained. Automated exposure control and iterative construction methods were used. Findings: There is linear scarring in the lungs. There are tree-in-bud nodules in the dorsal aspect of the left lower lobe and within the right lower lobe periphery and right middle lobe. There is mild diffuse  peribronchial thickening. Airways are patent. No suspicious lung nodules. No pneumothorax, pleural effusion or lobar consolidation. There is moderate emphysema. The thyroid, trachea and esophagus appear within normal limits. Heart size is normal. There are mild aortic atherosclerotic calcifications. There is aneurysmal dilatation of the proximal descending thoracic aorta up to 36 mm. There are no abnormal mediastinal lymph nodes or pericardial effusion. No acute findings in the superficial soft tissues or within the limited images of the upper abdomen. There are no acute osseous abnormalities or destructive bone lesions. There is moderate lower cervical disc degeneration. No significant DJD in the thoracic spine.     Impression: Impression: 1.No evidence of malignancy in the chest. 2.Moderate emphysema. 3.Tree-in-bud nodules in the lower lobes and right middle lobe and mild diffuse peribronchial thickening. Findings may reflect bronchitis with superimposed infectious bronchiolitis. 4.Aneurysmal dilatation of the proximal descending thoracic aorta up to 36 mm. 5.Moderate lower cervical disc degeneration. Electronically Signed: Luis E Ellis MD  9/17/2023 9:43 PM EDT  Workstation ID: [id number]    US Gallbladder    Result Date: 9/18/2023  US GALLBLADDER Date of Exam: 9/18/2023 9:24 AM EDT Indication: biliary dilation on CT. Comparison: CT abdomen pelvis 3/2/2022 Technique: Grayscale and color Doppler ultrasound evaluation of the right upper quadrant was performed. Findings: Liver and hepatic vasculature: Echogenicity is within normal limits. The portal vein and imaged hepatic veins demonstrate normal directional flow and normal waveform on spectral imaging. Gallbladder and biliary: Partially decompressed gallbladder. No gallstones. No gallbladder distention, wall thickening, or pericholecystic fluid. The common bile duct measures 13 mm, similar to prior CT. Right kidney: The right kidney measures 8.0 cm. Prominent  right renal pelvis without meena hydronephrosis, similar to prior CT. Pancreas: Visualized portions of the pancreas are unremarkable, though not well evaluated due to overlying bowel gas. Free fluid: There is no significant ascites.     Impression: Impression: Intra and extrahepatic biliary ductal dilatation, favored to be similar in caliber compared to 2022 CT considering differences in modality. Electronically Signed: Lazarus Sotelo MD  9/18/2023 10:16 AM EDT  Workstation ID: [id number]    XR Chest PA & Lateral    Result Date: 9/17/2023  XR CHEST PA AND LATERAL Date of Exam: 9/17/2023 11:00 AM EDT Indication: COPD, SOB follow-up Comparison: CT 9/17/2023. FINDINGS: Mild chronic changes of the lung fields are present without focal airspace opacity. There is no significant pleural effusion or distinct pneumothorax. Normal heart and mediastinal contours.     Impression: Mild chronic emphysematous changes of the lung fields without evidence of acute cardiopulmonary abnormality. Electronically Signed: Kalyan Funez MD  9/17/2023 4:51 PM EDT  Workstation ID: [id number]     Results for orders placed during the hospital encounter of 01/03/19    Adult Transthoracic Echo Complete W/ Cont if Necessary Per Protocol    Interpretation Summary  · Left ventricular systolic function is normal.  · Estimated EF appears to be in the range of 61 - 65%.  · Mild aortic valve regurgitation is present.      Current medications:  Scheduled Meds:amLODIPine, 10 mg, Oral, Q24H  budesonide, 0.5 mg, Nebulization, BID - RT  doxycycline, 100 mg, Oral, Q12H  heparin (porcine), 5,000 Units, Subcutaneous, Q8H  ipratropium-albuterol, 3 mL, Nebulization, Q6H - RT  Lidocaine, 1 patch, Transdermal, Q24H  methylPREDNISolone sodium succinate, 40 mg, Intravenous, Q8H  mirtazapine, 30 mg, Oral, Nightly  OXcarbazepine, 600 mg, Oral, Nightly  pharmacy consult - MTM, , Does not apply, Daily  polyethylene glycol, 17 g, Oral, Daily  QUEtiapine, 100 mg, Oral,  Nightly  sodium chloride, 10 mL, Intravenous, Q12H  tiZANidine, 4 mg, Oral, Nightly  vitamin B-12, 500 mcg, Oral, Daily      Continuous Infusions:     PRN Meds:.  acetaminophen **OR** acetaminophen **OR** acetaminophen    albuterol sulfate HFA    benzonatate    Calcium Replacement - Follow Nurse / BPA Driven Protocol    hydrALAZINE    HYDROcodone-acetaminophen    ipratropium-albuterol    Magnesium Standard Dose Replacement - Follow Nurse / BPA Driven Protocol    nitroglycerin    ondansetron **OR** ondansetron    Phosphorus Replacement - Follow Nurse / BPA Driven Protocol    Potassium Replacement - Follow Nurse / BPA Driven Protocol    sodium chloride    sodium chloride    sodium chloride    Assessment & Plan   Assessment & Plan     Active Hospital Problems    Diagnosis  POA    **COPD with acute exacerbation [J44.1]  Yes    Severe malnutrition [E43]  Yes    Hyponatremia [E87.1]  Unknown    Leukocytosis [D72.829]  Unknown    Acute-on-chronic respiratory failure [J96.20]  Yes    History of tobacco use [Z87.891]  Not Applicable    Hepatitis C antibody test positive [R76.8]  Yes    MINDI (generalized anxiety disorder) [F41.1]  Yes    Chronic constipation [K59.09]  Yes    MS (multiple sclerosis) [G35]  Yes    Essential hypertension [I10]  Yes      Resolved Hospital Problems   No resolved problems to display.        Brief Hospital Course to date:  Ms. Warren is a 73 yo WF prior smoker (15 pack year history, quit in August 2023) with PMH of HTN, MS, depression, Hep C  and COPD not on supplemental O2 who presented with acute COPD exacerbation.     This patient's problems and plans were partially entered by my partner and updated as appropriate by me 09/18/23.     Acute COPD exacerbation  Leukocytosis  -- not on supplemental O2 at baseline, currently requiring 2L NC, wean as appropriate, anticipate she may need home O2 at discharge  -- Legionella and strep pneumo urinary antigens are negative, COVID and flu were negative on  admission  --Continue DuoNebs, doxycycline and IV Solu-Medrol-wean to q12 )  -- scheduled and PRN duonebs  --continue Pulmicort Nebs   --Lidoderm patch to chest wall      Hyponatremia  -- Suspect this to be hypovolemic hyponatremia from poor p.o. intake  -- Slowly improving     Hyperkalemia  -Will give 1x dose Lokelma      MS  -- follows with Dr. Petit  -- continue home meds  -- uses walker, cane and wheelchair intermittently at home  -- PT/OT recommends rehab, patient agreeable, CM following, referrals sent     Hypertension  -- not on any home meds  -- Continue Amlodipine 10mg daily  -- PRN Hydralazine      Chronic Constipation  --continue Miralax     H/o Hep C ab+     H/o Tobacco Abuse  -- smoked 1/2 ppd for 30 years and quit last month  -- encouraged continued cessation     Hypotension overnight 9/16  -Suspect secondary to co-administration of Seroquel and muscle relaxer. She takes them together at home and no issues but also doesn't check BP. She was asymptomatic during low BP event  -Changed timing of medications, no further issues     Weight loss  -30 pounds in a year despite good appetite   -Colonoscopy  11/23/2021: single polyp removed (tubular adenoma) from transverse colon, recommend repeat scope in 3 years   -CT A/P 3/2/22: mild diffuse intra and extra hepatic biliary ductal dilatation  -GB US today with similar intra and expra hepatic biliary ductal dilation   -LFTs slightly elevated today  -Obtain MRCP abdomen/pelvis  -CT chest negative for malignancy     Expected Discharge Location and Transportation: Rehab  Expected Discharge   Expected Discharge Date: 9/19/2023; Expected Discharge Time:      DVT prophylaxis:  Medical DVT prophylaxis orders are present.     AM-PAC 6 Clicks Score (PT): 17 (09/17/23 0800)    CODE STATUS:   Code Status and Medical Interventions:   Ordered at: 09/13/23 1621     Level Of Support Discussed With:    Patient     Code Status (Patient has no pulse and is not breathing):     CPR (Attempt to Resuscitate)     Medical Interventions (Patient has pulse or is breathing):    Full Support       Mari Paredes, DO  09/18/23

## 2023-09-18 NOTE — CASE MANAGEMENT/SOCIAL WORK
Continued Stay Note  [facility]     Patient Name: Angela Diaz Parent  MRN: [id number]  Today's Date: 9/18/2023    Admit Date: 9/13/2023    Plan: discharge plan   Discharge Plan       Row Name 09/18/23 1108       Plan    Plan Comments Siria is following for [facility]. She has requested updated therapy notes. If accepted pt will not need precert                   Discharge Codes    No documentation.                 Expected Discharge Date and Time       Expected Discharge Date Expected Discharge Time    Sep 19, 2023               Sana Diaz RN

## 2023-09-19 ENCOUNTER — APPOINTMENT (OUTPATIENT)
Dept: MRI IMAGING | Facility: HOSPITAL | Age: 73
DRG: 190 | End: 2023-09-19
Payer: MEDICARE

## 2023-09-19 LAB
ALBUMIN SERPL-MCNC: 3.4 G/DL (ref 3.5–5.2)
ALBUMIN/GLOB SERPL: 1.3 G/DL
ALP SERPL-CCNC: 100 U/L (ref 39–117)
ALT SERPL W P-5'-P-CCNC: 89 U/L (ref 1–33)
ANION GAP SERPL CALCULATED.3IONS-SCNC: 9 MMOL/L (ref 5–15)
AST SERPL-CCNC: 36 U/L (ref 1–32)
BASOPHILS # BLD AUTO: 0.06 10*3/MM3 (ref 0–0.2)
BASOPHILS NFR BLD AUTO: 0.4 % (ref 0–1.5)
BILIRUB SERPL-MCNC: 0.3 MG/DL (ref 0–1.2)
BUN SERPL-MCNC: 32 MG/DL (ref 8–23)
BUN/CREAT SERPL: 51.6 (ref 7–25)
CALCIUM SPEC-SCNC: 9 MG/DL (ref 8.6–10.5)
CHLORIDE SERPL-SCNC: 94 MMOL/L (ref 98–107)
CO2 SERPL-SCNC: 26 MMOL/L (ref 22–29)
CREAT SERPL-MCNC: 0.62 MG/DL (ref 0.57–1)
DEPRECATED RDW RBC AUTO: 47 FL (ref 37–54)
EGFRCR SERPLBLD CKD-EPI 2021: 93.6 ML/MIN/1.73
EOSINOPHIL # BLD AUTO: 0 10*3/MM3 (ref 0–0.4)
EOSINOPHIL NFR BLD AUTO: 0 % (ref 0.3–6.2)
ERYTHROCYTE [DISTWIDTH] IN BLOOD BY AUTOMATED COUNT: 13.9 % (ref 12.3–15.4)
GLOBULIN UR ELPH-MCNC: 2.7 GM/DL
GLUCOSE SERPL-MCNC: 118 MG/DL (ref 65–99)
HCT VFR BLD AUTO: 32.7 % (ref 34–46.6)
HGB BLD-MCNC: 11.2 G/DL (ref 12–15.9)
IMM GRANULOCYTES # BLD AUTO: 0.52 10*3/MM3 (ref 0–0.05)
IMM GRANULOCYTES NFR BLD AUTO: 3.6 % (ref 0–0.5)
LYMPHOCYTES # BLD AUTO: 1.79 10*3/MM3 (ref 0.7–3.1)
LYMPHOCYTES NFR BLD AUTO: 12.5 % (ref 19.6–45.3)
MCH RBC QN AUTO: 31.9 PG (ref 26.6–33)
MCHC RBC AUTO-ENTMCNC: 34.3 G/DL (ref 31.5–35.7)
MCV RBC AUTO: 93.2 FL (ref 79–97)
MONOCYTES # BLD AUTO: 1.07 10*3/MM3 (ref 0.1–0.9)
MONOCYTES NFR BLD AUTO: 7.5 % (ref 5–12)
NEUTROPHILS NFR BLD AUTO: 10.91 10*3/MM3 (ref 1.7–7)
NEUTROPHILS NFR BLD AUTO: 76 % (ref 42.7–76)
NRBC BLD AUTO-RTO: 0 /100 WBC (ref 0–0.2)
PLATELET # BLD AUTO: 480 10*3/MM3 (ref 140–450)
PMV BLD AUTO: 8.8 FL (ref 6–12)
POTASSIUM SERPL-SCNC: 5.4 MMOL/L (ref 3.5–5.2)
PROT SERPL-MCNC: 6.1 G/DL (ref 6–8.5)
RBC # BLD AUTO: 3.51 10*6/MM3 (ref 3.77–5.28)
SODIUM SERPL-SCNC: 129 MMOL/L (ref 136–145)
WBC NRBC COR # BLD: 14.35 10*3/MM3 (ref 3.4–10.8)

## 2023-09-19 PROCEDURE — 74181 MRI ABDOMEN W/O CONTRAST: CPT

## 2023-09-19 PROCEDURE — 63710000001 PREDNISONE PER 1 MG: Performed by: FAMILY MEDICINE

## 2023-09-19 PROCEDURE — 94664 DEMO&/EVAL PT USE INHALER: CPT

## 2023-09-19 PROCEDURE — 80053 COMPREHEN METABOLIC PANEL: CPT | Performed by: FAMILY MEDICINE

## 2023-09-19 PROCEDURE — 94799 UNLISTED PULMONARY SVC/PX: CPT

## 2023-09-19 PROCEDURE — 85025 COMPLETE CBC W/AUTO DIFF WBC: CPT | Performed by: FAMILY MEDICINE

## 2023-09-19 PROCEDURE — 97110 THERAPEUTIC EXERCISES: CPT

## 2023-09-19 PROCEDURE — 25010000002 HEPARIN (PORCINE) PER 1000 UNITS: Performed by: INTERNAL MEDICINE

## 2023-09-19 PROCEDURE — 99232 SBSQ HOSP IP/OBS MODERATE 35: CPT | Performed by: FAMILY MEDICINE

## 2023-09-19 RX ORDER — SODIUM CHLORIDE 9 MG/ML
50 INJECTION, SOLUTION INTRAVENOUS CONTINUOUS
Status: ACTIVE | OUTPATIENT
Start: 2023-09-19 | End: 2023-09-19

## 2023-09-19 RX ORDER — PREDNISONE 20 MG/1
40 TABLET ORAL
Status: DISCONTINUED | OUTPATIENT
Start: 2023-09-19 | End: 2023-09-20

## 2023-09-19 RX ADMIN — PREDNISONE 40 MG: 20 TABLET ORAL at 11:49

## 2023-09-19 RX ADMIN — IPRATROPIUM BROMIDE AND ALBUTEROL SULFATE 3 ML: 2.5; .5 SOLUTION RESPIRATORY (INHALATION) at 12:36

## 2023-09-19 RX ADMIN — MIRTAZAPINE 30 MG: 15 TABLET, FILM COATED ORAL at 21:46

## 2023-09-19 RX ADMIN — OXCARBAZEPINE 600 MG: 150 TABLET, FILM COATED ORAL at 21:44

## 2023-09-19 RX ADMIN — HYDROCODONE BITARTRATE AND ACETAMINOPHEN 1 TABLET: 10; 325 TABLET ORAL at 21:50

## 2023-09-19 RX ADMIN — LIDOCAINE 1 PATCH: 560 PATCH PERCUTANEOUS; TOPICAL; TRANSDERMAL at 11:52

## 2023-09-19 RX ADMIN — DOXYCYCLINE 100 MG: 100 CAPSULE ORAL at 21:45

## 2023-09-19 RX ADMIN — POLYETHYLENE GLYCOL 3350 17 G: 17 POWDER, FOR SOLUTION ORAL at 11:49

## 2023-09-19 RX ADMIN — BUDESONIDE 0.5 MG: 0.5 INHALANT RESPIRATORY (INHALATION) at 06:47

## 2023-09-19 RX ADMIN — BUDESONIDE 0.5 MG: 0.5 INHALANT RESPIRATORY (INHALATION) at 19:21

## 2023-09-19 RX ADMIN — Medication 10 ML: at 09:00

## 2023-09-19 RX ADMIN — SODIUM ZIRCONIUM CYCLOSILICATE 10 G: 10 POWDER, FOR SUSPENSION ORAL at 11:52

## 2023-09-19 RX ADMIN — AMLODIPINE BESYLATE 10 MG: 10 TABLET ORAL at 11:48

## 2023-09-19 RX ADMIN — HYDROCODONE BITARTRATE AND ACETAMINOPHEN 1 TABLET: 10; 325 TABLET ORAL at 13:11

## 2023-09-19 RX ADMIN — IPRATROPIUM BROMIDE AND ALBUTEROL SULFATE 3 ML: 2.5; .5 SOLUTION RESPIRATORY (INHALATION) at 06:48

## 2023-09-19 RX ADMIN — DOXYCYCLINE 100 MG: 100 CAPSULE ORAL at 11:48

## 2023-09-19 RX ADMIN — HEPARIN SODIUM 5000 UNITS: 5000 INJECTION, SOLUTION INTRAVENOUS; SUBCUTANEOUS at 05:12

## 2023-09-19 RX ADMIN — SODIUM CHLORIDE 50 ML/HR: 9 INJECTION, SOLUTION INTRAVENOUS at 12:02

## 2023-09-19 RX ADMIN — IPRATROPIUM BROMIDE AND ALBUTEROL SULFATE 3 ML: 2.5; .5 SOLUTION RESPIRATORY (INHALATION) at 19:20

## 2023-09-19 RX ADMIN — QUETIAPINE FUMARATE 100 MG: 100 TABLET ORAL at 21:46

## 2023-09-19 RX ADMIN — HEPARIN SODIUM 5000 UNITS: 5000 INJECTION, SOLUTION INTRAVENOUS; SUBCUTANEOUS at 13:11

## 2023-09-19 RX ADMIN — TIZANIDINE 4 MG: 4 TABLET ORAL at 21:46

## 2023-09-19 RX ADMIN — CYANOCOBALAMIN TAB 1000 MCG 500 MCG: 1000 TAB at 11:49

## 2023-09-19 NOTE — PROGRESS NOTES
[facility] Medicine Services  PROGRESS NOTE    Patient Name: Angela Diaz Parent  : 1950  MRN: [id number]    Date of Admission: 2023  Primary Care Physician: Rosalba Howard APRN    Subjective   Subjective     CC:   Follow-up SOA    HPI:   Patient seen and examined. States IV steroids burned her IV last night. Asks if she can switch to oral.     Objective   Objective     Vital Signs:   Temp:  [97.9 °F (36.6 °C)-98.8 °F (37.1 °C)] 97.9 °F (36.6 °C)  Heart Rate:  [] 78  Resp:  [17-18] 17  BP: (117-163)/(73-90) 125/73  Flow (L/min):  [2] 2     Physical Exam:  Constitutional: No acute distress, awake, alert, chronically ill appearing, frail appearing   HENT: NCAT, mucous membranes moist  Respiratory: Decreased in bases, 2L NC  Cardiovascular: RRR, no murmurs, rubs, or gallops  Gastrointestinal: Positive bowel sounds, soft, nontender, nondistended  Musculoskeletal: No bilateral ankle edema  Psychiatric: Appropriate affect, cooperative  Neurologic: Nonfocal  Skin: No rashes     Results Reviewed:  LAB RESULTS:      Lab 23  0430 23  0405 23  0338 23  0308 23  1232   WBC 14.35* 16.44* 14.26* 15.38* 19.20*   HEMOGLOBIN 11.2* 11.3* 10.7* 11.4* 12.9   HEMATOCRIT 32.7* 33.3* 32.5* 32.7* 37.8   PLATELETS 480* 524* 473* 482* 555*   NEUTROS ABS 10.91*  --  11.68*  --  15.58*   IMMATURE GRANS (ABS) 0.52*  --  0.37*  --  0.30*   LYMPHS ABS 1.79  --  1.51  --  1.70   MONOS ABS 1.07*  --  0.67  --  1.55*   EOS ABS 0.00  --  0.00  --  0.03   MCV 93.2 93.3 95.9 93.2 93.8   PROCALCITONIN  --   --   --   --  0.22   LACTATE  --   --   --   --  1.4         Lab 23  0430 23  0405 23  0338 23  0308 23  1232   SODIUM 129* 130* 129* 127* 125*   POTASSIUM 5.4* 5.3* 4.8 4.8 4.7   CHLORIDE 94* 97* 97* 90* 86*   CO2 26.0 23.0 22.0 25.0 28.0   ANION GAP 9.0 10.0 10.0 12.0 11.0   BUN 32* 30* 30* 13 10   CREATININE 0.62 0.70 0.78 0.56* 0.57   EGFR  93.6 90.9 79.8 95.9 95.5   GLUCOSE 118* 134* 216* 99 120*   CALCIUM 9.0 9.2 9.0 9.2 9.4   HEMOGLOBIN A1C  --   --   --  5.70*  --    TSH  --   --   --  0.665  --          Lab 09/19/23  0430 09/18/23  0405 09/13/23  1232   TOTAL PROTEIN 6.1 6.2 7.3   ALBUMIN 3.4* 3.4* 3.8   GLOBULIN 2.7 2.8 3.5   ALT (SGPT) 89* 76* 15   AST (SGOT) 36* 43* 25   BILIRUBIN 0.3 0.2 0.3   ALK PHOS 100 106 148*         Lab 09/13/23  1232   PROBNP 565.0   HSTROP T 21*         Lab 09/14/23  0308   CHOLESTEROL 152   LDL CHOL 92   HDL CHOL 45   TRIGLYCERIDES 76             Brief Urine Lab Results  (Last result in the past 365 days)        Color   Clarity   Blood   Leuk Est   Nitrite   Protein   CREAT   Urine HCG        09/13/23 1837 Yellow   Clear   Negative   Negative   Negative   100 mg/dL (2+)                   Microbiology Results Abnormal       Procedure Component Value - Date/Time    Blood Culture - Blood, Wrist, Left [[id number]]  (Normal) Collected: 09/13/23 1313    Lab Status: Final result Specimen: Blood from Wrist, Left Updated: 09/18/23 1400     Blood Culture No growth at 5 days    Blood Culture - Blood, Arm, Right [[id number]]  (Normal) Collected: 09/13/23 1309    Lab Status: Final result Specimen: Blood from Arm, Right Updated: 09/18/23 1400     Blood Culture No growth at 5 days    Respiratory Culture - Sputum, Cough [[id number]] Collected: 09/15/23 1806    Lab Status: Final result Specimen: Sputum from Cough Updated: 09/15/23 1903     Respiratory Culture Rejected     Gram Stain Moderate (3+) Epithelial cells per low power field      Moderate (3+) WBCs per low power field      Many (4+) Gram positive cocci in chains      Few (2+) Gram negative bacilli    Narrative:      Specimen rejected due to oropharyngeal contamination. Please reorder and recollect specimen if clinically necessary.    Respiratory Panel PCR w/COVID-19(SARS-CoV-2) ALEXANDER/GHANSHYAM/LAURENT/PAD/COR/MAD/ZULLY In-House, NP Swab in UTM/VTM, 3-4 HR TAT - Swab, Nasopharynx [[id number]]   (Normal) Collected: 09/15/23 0545    Lab Status: Final result Specimen: Swab from Nasopharynx Updated: 09/15/23 0635     ADENOVIRUS, PCR Not Detected     Coronavirus 229E Not Detected     Coronavirus HKU1 Not Detected     Coronavirus NL63 Not Detected     Coronavirus OC43 Not Detected     COVID19 Not Detected     Human Metapneumovirus Not Detected     Human Rhinovirus/Enterovirus Not Detected     Influenza A PCR Not Detected     Influenza B PCR Not Detected     Parainfluenza Virus 1 Not Detected     Parainfluenza Virus 2 Not Detected     Parainfluenza Virus 3 Not Detected     Parainfluenza Virus 4 Not Detected     RSV, PCR Not Detected     Bordetella pertussis pcr Not Detected     Bordetella parapertussis PCR Not Detected     Chlamydophila pneumoniae PCR Not Detected     Mycoplasma pneumo by PCR Not Detected    Narrative:      In the setting of a positive respiratory panel with a viral infection PLUS a negative procalcitonin without other underlying concern for bacterial infection, consider observing off antibiotics or discontinuation of antibiotics and continue supportive care. If the respiratory panel is positive for atypical bacterial infection (Bordetella pertussis, Chlamydophila pneumoniae, or Mycoplasma pneumoniae), consider antibiotic de-escalation to target atypical bacterial infection.    MRSA Screen, PCR (Inpatient) - Swab, Nares [[id number]]  (Normal) Collected: 09/14/23 0601    Lab Status: Final result Specimen: Swab from Nares Updated: 09/14/23 0910     MRSA PCR Negative    Narrative:      The negative predictive value of this diagnostic test is high and should only be used to consider de-escalating anti-MRSA therapy. A positive result may indicate colonization with MRSA and must be correlated clinically.  MRSA Negative    Legionella Antigen, Urine - Urine, Urine, Clean Catch [[id number]]  (Normal) Collected: 09/13/23 1837    Lab Status: Final result Specimen: Urine, Clean Catch Updated: 09/14/23 0004      LEGIONELLA ANTIGEN, URINE Negative    S. Pneumo Ag Urine or CSF - Urine, Urine, Clean Catch [[id number]]  (Normal) Collected: 09/13/23 1837    Lab Status: Final result Specimen: Urine, Clean Catch Updated: 09/14/23 0004     Strep Pneumo Ag Negative    COVID PRE-OP / PRE-PROCEDURE SCREENING ORDER (NO ISOLATION) - Swab, Nasopharynx [[id number]]  (Normal) Collected: 09/13/23 1314    Lab Status: Final result Specimen: Swab from Nasopharynx Updated: 09/13/23 1417    Narrative:      The following orders were created for panel order COVID PRE-OP / PRE-PROCEDURE SCREENING ORDER (NO ISOLATION) - Swab, Nasopharynx.  Procedure                               Abnormality         Status                     ---------                               -----------         ------                     COVID-19 and FLU A/B PCR...[[id number]]  Normal              Final result                 Please view results for these tests on the individual orders.    COVID-19 and FLU A/B PCR - Swab, Nasopharynx [[id number]]  (Normal) Collected: 09/13/23 1314    Lab Status: Final result Specimen: Swab from Nasopharynx Updated: 09/13/23 1417     COVID19 Not Detected     Influenza A PCR Not Detected     Influenza B PCR Not Detected    Narrative:      Fact sheet for providers: https://www.fda.gov/media/747989/download    Fact sheet for patients: https://www.fda.gov/media/494389/download    Test performed by PCR.            CT Chest Without Contrast Diagnostic    Result Date: 9/17/2023  CT CHEST WO CONTRAST DIAGNOSTIC Date of Exam: 9/17/2023 12:55 PM EDT Indication: weight loss, COPD, smoker, rule out malignancy. Comparison: None available. Technique: Axial CT images were obtained of the chest without contrast administration.  Reconstructed coronal and sagittal images were also obtained. Automated exposure control and iterative construction methods were used. Findings: There is linear scarring in the lungs. There are tree-in-bud nodules in the dorsal aspect of  the left lower lobe and within the right lower lobe periphery and right middle lobe. There is mild diffuse peribronchial thickening. Airways are patent. No suspicious lung nodules. No pneumothorax, pleural effusion or lobar consolidation. There is moderate emphysema. The thyroid, trachea and esophagus appear within normal limits. Heart size is normal. There are mild aortic atherosclerotic calcifications. There is aneurysmal dilatation of the proximal descending thoracic aorta up to 36 mm. There are no abnormal mediastinal lymph nodes or pericardial effusion. No acute findings in the superficial soft tissues or within the limited images of the upper abdomen. There are no acute osseous abnormalities or destructive bone lesions. There is moderate lower cervical disc degeneration. No significant DJD in the thoracic spine.     Impression: Impression: 1.No evidence of malignancy in the chest. 2.Moderate emphysema. 3.Tree-in-bud nodules in the lower lobes and right middle lobe and mild diffuse peribronchial thickening. Findings may reflect bronchitis with superimposed infectious bronchiolitis. 4.Aneurysmal dilatation of the proximal descending thoracic aorta up to 36 mm. 5.Moderate lower cervical disc degeneration. Electronically Signed: Luis E Ellis MD  9/17/2023 9:43 PM EDT  Workstation ID: [id number]    US Gallbladder    Result Date: 9/18/2023  US GALLBLADDER Date of Exam: 9/18/2023 9:24 AM EDT Indication: biliary dilation on CT. Comparison: CT abdomen pelvis 3/2/2022 Technique: Grayscale and color Doppler ultrasound evaluation of the right upper quadrant was performed. Findings: Liver and hepatic vasculature: Echogenicity is within normal limits. The portal vein and imaged hepatic veins demonstrate normal directional flow and normal waveform on spectral imaging. Gallbladder and biliary: Partially decompressed gallbladder. No gallstones. No gallbladder distention, wall thickening, or pericholecystic fluid. The common  bile duct measures 13 mm, similar to prior CT. Right kidney: The right kidney measures 8.0 cm. Prominent right renal pelvis without meena hydronephrosis, similar to prior CT. Pancreas: Visualized portions of the pancreas are unremarkable, though not well evaluated due to overlying bowel gas. Free fluid: There is no significant ascites.     Impression: Impression: Intra and extrahepatic biliary ductal dilatation, favored to be similar in caliber compared to 2022 CT considering differences in modality. Electronically Signed: Lazarus Sotelo MD  9/18/2023 10:16 AM EDT  Workstation ID: [id number]    MRI abdomen wo contrast mrcp    Result Date: 9/19/2023  MRI ABDOMEN WO CONTRAST MRCP Date of Exam: 9/19/2023 10:10 AM EDT Indication: weight loss, biliary ductal dilation.  Comparison: Abdominal ultrasound 9/18/2023, CT abdomen pelvis 3/2/2022. Technique:  Routine multiplanar/multisequence images of the abdomen were obtained with MRCP sequences without contrast administration. Findings: Lower Thorax: No focal consolidation. Liver: Small cysts. No suspicious liver lesions. Gallbladder and bile ducts: Gallbladder is unremarkable. Mild extra and extrahepatic biliary ductal dilatation to the level of the ampulla, similar in caliber dating back to 2022 CT with common bile duct again measuring 13 mm. No filling defects to suggest choledocholithiasis. Pancreas: No pancreatic duct dilation. No surrounding inflammation. Spleen: Spleen is normal size. Adrenal glands: No discrete adrenal nodule. Kidneys: No hydronephrosis. Bilateral renal cysts. Stomach and bowel: No evidence of bowel obstruction. Lymph nodes: No pathologically enlarged lymph nodes. Vasculature: No aneurysmal dilation of the abdominal aorta. Atherosclerosis. Peritoneum and retroperitoneum: No free air or free fluid. Soft tissues: Unremarkable. Osseous structures: No aggressive focal lytic or sclerotic osseous lesions.     Impression: Impression: Mild extra and extrahepatic  biliary ductal dilatation is similar dating back to 2022. No findings of choledocholithiasis or other obstructive process. Electronically Signed: Lazarus Sotelo MD  9/19/2023 11:49 AM EDT  Workstation ID: [id number]     Results for orders placed during the hospital encounter of 01/03/19    Adult Transthoracic Echo Complete W/ Cont if Necessary Per Protocol    Interpretation Summary  · Left ventricular systolic function is normal.  · Estimated EF appears to be in the range of 61 - 65%.  · Mild aortic valve regurgitation is present.      Current medications:  Scheduled Meds:amLODIPine, 10 mg, Oral, Q24H  budesonide, 0.5 mg, Nebulization, BID - RT  doxycycline, 100 mg, Oral, Q12H  heparin (porcine), 5,000 Units, Subcutaneous, Q8H  ipratropium-albuterol, 3 mL, Nebulization, Q6H - RT  Lidocaine, 1 patch, Transdermal, Q24H  mirtazapine, 30 mg, Oral, Nightly  OXcarbazepine, 600 mg, Oral, Nightly  pharmacy consult - MTM, , Does not apply, Daily  polyethylene glycol, 17 g, Oral, Daily  predniSONE, 40 mg, Oral, Daily With Breakfast  QUEtiapine, 100 mg, Oral, Nightly  sodium chloride, 10 mL, Intravenous, Q12H  tiZANidine, 4 mg, Oral, Nightly  vitamin B-12, 500 mcg, Oral, Daily      Continuous Infusions:sodium chloride, 50 mL/hr, Last Rate: 50 mL/hr (09/19/23 1202)        PRN Meds:.  acetaminophen **OR** acetaminophen **OR** acetaminophen    albuterol sulfate HFA    benzonatate    Calcium Replacement - Follow Nurse / BPA Driven Protocol    hydrALAZINE    HYDROcodone-acetaminophen    ipratropium-albuterol    Magnesium Standard Dose Replacement - Follow Nurse / BPA Driven Protocol    nitroglycerin    ondansetron **OR** ondansetron    Phosphorus Replacement - Follow Nurse / BPA Driven Protocol    Potassium Replacement - Follow Nurse / BPA Driven Protocol    sodium chloride    sodium chloride    sodium chloride    Assessment & Plan   Assessment & Plan     Active Hospital Problems    Diagnosis  POA    **COPD with acute exacerbation  [J44.1]  Yes    Severe malnutrition [E43]  Yes    Hyponatremia [E87.1]  Unknown    Leukocytosis [D72.829]  Unknown    Acute-on-chronic respiratory failure [J96.20]  Yes    History of tobacco use [Z87.891]  Not Applicable    Hepatitis C antibody test positive [R76.8]  Yes    MINDI (generalized anxiety disorder) [F41.1]  Yes    Chronic constipation [K59.09]  Yes    MS (multiple sclerosis) [G35]  Yes    Essential hypertension [I10]  Yes      Resolved Hospital Problems   No resolved problems to display.        Brief Hospital Course to date:  Ms. Warren is a 71 yo WF prior smoker (15 pack year history, quit in August 2023) with PMH of HTN, MS, depression, Hep C  and COPD not on supplemental O2 who presented with acute COPD exacerbation.     This patient's problems and plans were partially entered by my partner and updated as appropriate by me 09/19/23.     Acute COPD exacerbation  Leukocytosis (steroid induced)  --Not on supplemental O2 at baseline, currently requiring 2L NC, anticipate she will need home O2 at discharge, plan is rehab  -- Legionella and strep pneumo urinary antigens are negative, COVID and flu were negative on admission  --Continue DuoNebs  --Continue Doxycycline x 7 days total   --DC IV Solu-Medrol, start Prednisone 40mg daily with plan to taper every 5 days  --continue Pulmicort Nebs   --Lidoderm patch to chest wall for tenderness      Hyponatremia  -- Was slowly improving, back down to 129 today  -- Gentle IVF 50cc/hr x 10 hours  -- BMP in AM   --Pt also on Trileptal, could be causing some low sodium     Hyperkalemia  -Received dose of Lokelma yesterday, will repeat again today     MS  -- follows with Dr. Petit  -- continue home meds  -- uses walker, cane and wheelchair intermittently at home  -- PT/OT recommends rehab, patient agreeable, CM following, referrals sent     Hypertension  -- not on any home meds  -- Continue Amlodipine 10mg daily  -- PRN Hydralazine      Chronic Constipation  --continue  Miralax     H/o Hep C ab+     H/o Tobacco Abuse  -- smoked 1/2 ppd for 30 years and quit last month  -- encouraged continued cessation     Hypotension overnight 9/16  -Suspect secondary to co-administration of Seroquel and muscle relaxer. She takes them together at home and no issues but also doesn't check BP. She was asymptomatic during low BP event  -Changed timing of medications, no further issues     Weight loss  -30 pounds in a year despite good appetite   -Colonoscopy  11/23/2021: single polyp removed (tubular adenoma) from transverse colon, recommend repeat scope in 3 years   -CT A/P 3/2/22: mild diffuse intra and extra hepatic biliary ductal dilatation  -GB US 9/18 with similar intra and expra hepatic biliary ductal dilation   -MRCP abdomen/pelvis with similar mild intra and expra hepatic biliary ductal dilation, no evidence of choledocholithiasis   -CT chest negative for malignancy   -Discussed with patient, recommend OUTPATIENT EGD/Colonoscopy to further evaluate her weight loss once she is improved from this acute illness. She is agreeable.    Expected Discharge Location and Transportation: Rehab  Expected Discharge   Expected Discharge Date: 9/20/2023; Expected Discharge Time:      DVT prophylaxis:  Medical DVT prophylaxis orders are present.     AM-PAC 6 Clicks Score (PT): 18 (09/18/23 5473)    CODE STATUS:   Code Status and Medical Interventions:   Ordered at: 09/13/23 1621     Level Of Support Discussed With:    Patient     Code Status (Patient has no pulse and is not breathing):    CPR (Attempt to Resuscitate)     Medical Interventions (Patient has pulse or is breathing):    Full Support       Mari Paredes,   09/19/23

## 2023-09-19 NOTE — PLAN OF CARE
Goal Outcome Evaluation:         VSS. Pt remains on 2L NC. NS infusing at 50 mL/hr. Will continue with plan of care.

## 2023-09-19 NOTE — PROGRESS NOTES
NN spoke with patient at .  Patient alert and able to answer questions appropriately.  O2 sat >90% on 2L, home baseline is 3L.  Deep breathing exercises and safe swallow encouraged.  No other questions or concerns at this time.  NN continues to follow.        Per current GOLD Standards, please consider: No LAMA/LABA/ICS in place, Outpatient PFT, Rehab as appropriate, Palliative Care consult, [facility], Annual LDCT per current screening guidelines (age 50-80 years old, smoking history of 20 pack years or more or has quit within past 15 years)

## 2023-09-19 NOTE — CASE MANAGEMENT/SOCIAL WORK
Continued Stay Note  [facility]     Patient Name: Angela Diaz Parent  MRN: [id number]  Today's Date: 9/19/2023    Admit Date: 9/13/2023    Plan: discharge plan   Discharge Plan       Row Name 09/19/23 1341       Plan    Plan discharge plan    Plan Comments Patient has updated PT and OT notes. Per Liset(liason for [facility]), she has submitted referral to their doctor and waitng to hear back. If doctor agrees to accept pt, [facility] may be able to take pt tomorrow. CM will follow up tomorrow.    Final Discharge Disposition Code 62 - inpatient rehab facility                   Discharge Codes    No documentation.                 Expected Discharge Date and Time       Expected Discharge Date Expected Discharge Time    Sep 20, 2023               Yara Case RN

## 2023-09-19 NOTE — PLAN OF CARE
Goal Outcome Evaluation:  Plan of Care Reviewed With: patient        Progress: declining  Outcome Evaluation: Per pt. her extended time down at MRI today exhausted her.  Pt. needed much encouragement, but agreeable to some therapy.  Pt. continued with impaired strength, balance, and activity tolerance impacting PLOF warranting continued skilled OT services.      Anticipated Discharge Disposition (OT): inpatient rehabilitation facility

## 2023-09-19 NOTE — THERAPY TREATMENT NOTE
Patient Name: Angela Diaz Parent  : 1950    MRN: [id number]                              Today's Date: 2023       Admit Date: 2023    Visit Dx:     ICD-10-CM ICD-9-CM   1. Acute on chronic respiratory failure with hypoxia  J96.21 518.84     799.02   2. COPD exacerbation  J44.1 491.21   3. Hyponatremia  E87.1 276.1   4. Bandemia  D72.825 288.66     Patient Active Problem List   Diagnosis    Essential hypertension    Palpitations    Other forms of angina pectoris    CKD (chronic kidney disease) stage 2, GFR 60-89 ml/min    MS (multiple sclerosis)    Mild aortic regurgitation    Chronic constipation    MINDI (generalized anxiety disorder)    Polyp of colon    Hepatitis C antibody test positive    Neuropathic pain    Prediabetes    GERD without esophagitis    Hyperglycemia    Closed trimalleolar fracture, left, initial encounter    History of tobacco use    Weight loss    Chronic fatigue    Cough    Lower abdominal pain    Reactive depression    Medication monitoring encounter    Anxiety    Left lower quadrant abdominal pain    Impaired mobility    Bilious vomiting with nausea    COPD (chronic obstructive pulmonary disease)    Right upper quadrant pain    COPD with acute exacerbation    Hyponatremia    Leukocytosis    Acute-on-chronic respiratory failure    Severe malnutrition     Past Medical History:   Diagnosis Date    Acid reflux     Anxiety     Anxiety     Arthritis     Cataract     CKD (chronic kidney disease) stage 2, GFR 60-89 ml/min 2018    Colon polyp     Depression     GERD (gastroesophageal reflux disease)     Headache     Heart murmur     History of blood transfusion 1971    after surgery     Hypertension 2018    Infectious viral hepatitis     Mild aortic regurgitation 2019    MS (multiple sclerosis)     Pneumonia     Prediabetes 2019     Past Surgical History:   Procedure Laterality Date    ANKLE OPEN REDUCTION INTERNAL FIXATION Left 2019    Procedure: ANKLE OPEN  REDUCTION INTERNAL FIXATION;  Surgeon: Stan Lozoya MD;  Location: [facility];  Service: Orthopedics    CATARACT EXTRACTION  2009    CERVICAL BIOPSY  1971    benign    COLONOSCOPY      HEMORRHOIDECTOMY  1971    TUBAL ABDOMINAL LIGATION  1985      General Information       Row Name 09/19/23 1326          OT Time and Intention    Document Type therapy note (daily note)  -     Mode of Treatment individual therapy;occupational therapy  -       Row Name 09/19/23 1326          General Information    Patient Profile Reviewed yes  -KANG     Existing Precautions/Restrictions fall;oxygen therapy device and L/min  -KANG     Barriers to Rehab medically complex;previous functional deficit  -KANG       Row Name 09/19/23 1326          Cognition    Orientation Status (Cognition) oriented x 3  -       Row Name 09/19/23 1326          Safety Issues, Functional Mobility    Safety Issues Affecting Function (Mobility) safety precaution awareness  -     Impairments Affecting Function (Mobility) balance;endurance/activity tolerance;pain;shortness of breath;strength  -               User Key  (r) = Recorded By, (t) = Taken By, (c) = Cosigned By      Initials Name Provider Type    KANG Cora Hi, ARIELLE Occupational Therapist                     Mobility/ADL's       Row Name 09/19/23 1327          Bed Mobility    Supine-Sit Miner (Bed Mobility) standby assist  -     Sit-Supine Miner (Bed Mobility) standby assist  -     Assistive Device (Bed Mobility) bed rails  -       Row Name 09/19/23 1327          Transfers    Transfers sit-stand transfer;stand-sit transfer  -       Row Name 09/19/23 1327          Sit-Stand Transfer    Sit-Stand Miner (Transfers) minimum assist (75% patient effort);verbal cues  -     Assistive Device (Sit-Stand Transfers) walker, front-wheeled  -KANG     Comment, (Sit-Stand Transfer) cues for hand placement  -       Row Name 09/19/23 1327          Stand-Sit Transfer     Stand-Sit Alexander (Transfers) contact guard  -     Assistive Device (Stand-Sit Transfers) walker, front-wheeled  -       Row Name 09/19/23 1327          Functional Mobility    Functional Mobility- Comment pt. declined due to fatigue from MRI today  -       Row Name 09/19/23 1327          Activities of Daily Living    BADL Assessment/Intervention lower body dressing;feeding  pt. declined other due to fatigue at this time  -       Row Name 09/19/23 1327          Lower Body Dressing Assessment/Training    Alexander Level (Lower Body Dressing) shoes/slippers;don;minimum assist (75% patient effort);doff;independent  -KANG     Position (Lower Body Dressing) edge of bed sitting  -     Comment, (Lower Body Dressing) pt. needed foot block to help put on shoes on slick floor  -       Row Name 09/19/23 1327          Self-Feeding Assessment/Training    Alexander Level (Feeding) liquids to mouth;independent  -KANG     Position (Self-Feeding) sitting up in bed  -               User Key  (r) = Recorded By, (t) = Taken By, (c) = Cosigned By      Initials Name Provider Type    Cora Garza, OT Occupational Therapist                   Obj/Interventions       [facility] Name 09/19/23 1330          Shoulder (Therapeutic Exercise)    Shoulder (Therapeutic Exercise) AROM (active range of motion)  -     Shoulder AROM (Therapeutic Exercise) bilateral;flexion;extension;aBduction;aDduction;horizontal aBduction/aDduction;5 repetitions;10 repetitions;supine  -[facility] Name 09/19/23 1330          Elbow/Forearm (Therapeutic Exercise)    Elbow/Forearm (Therapeutic Exercise) strengthening exercise  -     Elbow/Forearm Strengthening (Therapeutic Exercise) bilateral;flexion;extension;supination;10 repetitions  mild manual resistance given  -       Row Name 09/19/23 1330          Motor Skills    Therapeutic Exercise shoulder;elbow/forearm  -[facility] Name 09/19/23 1330          Balance    Static Sitting Balance  independent  -KANG     Dynamic Sitting Balance supervision  -KANG     Position, Sitting Balance unsupported;sitting edge of bed  -KANG     Static Standing Balance contact guard  -KANG     Dynamic Standing Balance contact guard  -KANG     Position/Device Used, Standing Balance walker, front-wheeled;supported  -KANG     Balance Interventions sit to stand;occupation based/functional task  -KANG     Comment, Balance LBD, pt. was able to march in place grossly 15 seconds and then to fatigued to stand further  -KANG               User Key  (r) = Recorded By, (t) = Taken By, (c) = Cosigned By      Initials Name Provider Type    Cora Garza, OT Occupational Therapist                   Goals/Plan       Row Name 09/19/23 1334          Transfer Goal 1 (OT)    Progress/Outcome (Transfer Goal 1, OT) goal ongoing  -KANG       Row Name 09/19/23 1334          Toileting Goal 1 (OT)    Progress/Outcome (Toileting Goal 1, OT) goal ongoing  -KANG       Row Name 09/19/23 1334          Grooming Goal 1 (OT)    Progress/Outcome (Grooming Goal 1, OT) goal ongoing  -KANG               User Key  (r) = Recorded By, (t) = Taken By, (c) = Cosigned By      Initials Name Provider Type    Cora Garza, OT Occupational Therapist                   Clinical Impression       Row Name 09/19/23 1331          Pain Assessment    Pretreatment Pain Rating 7/10  -KANG     Posttreatment Pain Rating 7/10  -KANG     Pain Location - chest;back  -KANG     Pain Intervention(s) Ambulation/increased activity;Repositioned;Medication (See MAR);Nursing Notified  -KANG       Row Name 09/19/23 1331          Plan of Care Review    Plan of Care Reviewed With patient  -KANG     Progress declining  -KANG     Outcome Evaluation Per pt. her extended time down at MRI today exhausted her.  Pt. needed much encouragement, but agreeable to some therapy.  Pt. continued with impaired strength, balance, and activity tolerance impacting PLOF warranting continued skilled OT services.  -KANG       Row Name  09/19/23 1331          Therapy Assessment/Plan (OT)    Therapy Frequency (OT) daily  -KANG       Row Name 09/19/23 1331          Therapy Plan Review/Discharge Plan (OT)    Anticipated Discharge Disposition (OT) inpatient rehabilitation facility  -       Row Name 09/19/23 1331          Vital Signs    Pre Systolic BP Rehab 148  -KANG     Pre Treatment Diastolic BP 76  -KANG     Pretreatment Heart Rate (beats/min) 90  -KANG     Intratreatment Heart Rate (beats/min) 110  -KANG     Posttreatment Heart Rate (beats/min) 99  -KANG     Pre SpO2 (%) 94  -KANG     O2 Delivery Pre Treatment nasal cannula  -KANG     O2 Delivery Intra Treatment nasal cannula  -KANG     Post SpO2 (%) 98  -KANG     O2 Delivery Post Treatment nasal cannula  -KANG     Pre Patient Position Supine  -KANG     Intra Patient Position Standing  -KANG     Post Patient Position Supine  -KANG       Row Name 09/19/23 1331          Positioning and Restraints    Pre-Treatment Position in bed  -KNAG     Post Treatment Position bed  -KANG     In Bed notified nsg;supine;call light within reach;encouraged to call for assist;exit alarm on;side rails up x2  -KANG               User Key  (r) = Recorded By, (t) = Taken By, (c) = Cosigned By      Initials Name Provider Type    Cora Garza, ARIELLE Occupational Therapist                   Outcome Measures       Row Name 09/19/23 1334          How much help from another is currently needed...    Putting on and taking off regular lower body clothing? 3  -KANG     Bathing (including washing, rinsing, and drying) 3  -KANG     Toileting (which includes using toilet bed pan or urinal) 3  -KANG     Putting on and taking off regular upper body clothing 3  -KANG     Taking care of personal grooming (such as brushing teeth) 3  -KANG     Eating meals 4  -KANG     AM-PAC 6 Clicks Score (OT) 19  -KANG               User Key  (r) = Recorded By, (t) = Taken By, (c) = Cosigned By      Initials Name Provider Type    Cora Garza OT Occupational Therapist                     Occupational Therapy Education       Title: PT OT SLP Therapies (In Progress)       Topic: Occupational Therapy (In Progress)       Point: ADL training (In Progress)       Description:   Instruct learner(s) on proper safety adaptation and remediation techniques during self care or transfers.   Instruct in proper use of assistive devices.                  Learning Progress Summary             Patient Acceptance, E,D, NR by  at 9/19/2023 1335    Comment: tranfer technique, UE TE    Acceptance, E, NR by  at 9/14/2023 1012                         Point: Home exercise program (In Progress)       Description:   Instruct learner(s) on appropriate technique for monitoring, assisting and/or progressing therapeutic exercises/activities.                  Learning Progress Summary             Patient Acceptance, E,D, NR by  at 9/19/2023 1335    Comment: tranfer technique, UE TE                         Point: Precautions (In Progress)       Description:   Instruct learner(s) on prescribed precautions during self-care and functional transfers.                  Learning Progress Summary             Patient Acceptance, E,D, NR by  at 9/19/2023 1335    Comment: tranfer technique, UE TE    Acceptance, E, NR by  at 9/14/2023 1012                         Point: Body mechanics (In Progress)       Description:   Instruct learner(s) on proper positioning and spine alignment during self-care, functional mobility activities and/or exercises.                  Learning Progress Summary             Patient Acceptance, E, NR by  at 9/14/2023 1012                                         User Key       Initials Effective Dates Name Provider Type Discipline     07/11/23 -  Cora Hi, OT Occupational Therapist OT     10/14/22 -  Candida Deutsch OT Occupational Therapist OT                  OT Recommendation and Plan  Therapy Frequency (OT): daily  Plan of Care Review  Plan of Care Reviewed With: patient  Progress:  declining  Outcome Evaluation: Per pt. her extended time down at MRI today exhausted her.  Pt. needed much encouragement, but agreeable to some therapy.  Pt. continued with impaired strength, balance, and activity tolerance impacting PLOF warranting continued skilled OT services.     Time Calculation:         Time Calculation- OT       Row Name 09/19/23 1336             Time Calculation- OT    OT Start Time 1257  -KANG      OT Received On 09/19/23  -KANG      OT Goal Re-Cert Due Date 09/24/23  -KANG         Timed Charges    82125 - OT Therapeutic Exercise Minutes 8  -KANG      66163 - OT Therapeutic Activity Minutes 8  -KANG      20895 - OT Self Care/Mgmt Minutes 3  -KANG         Total Minutes    Timed Charges Total Minutes 19  -KANG       Total Minutes 19  -KANG                User Key  (r) = Recorded By, (t) = Taken By, (c) = Cosigned By      Initials Name Provider Type    Cora Garza OT Occupational Therapist                  Therapy Charges for Today       Code Description Service Date Service Provider Modifiers Qty    [id number] HC OT THER PROC EA 15 MIN 9/19/2023 Cora Hi OT GO 1                 Cora Hi OT  9/19/2023

## 2023-09-19 NOTE — PLAN OF CARE
Goal Outcome Evaluation:      VSS.SR. 2l nasal cannula. Pain treated PRN see MAR. Patient requesting that her steroids be changed to oral instead of IV, she was very tearful this shift because she said the steroids burnt her IV and she was just tired and did not want to be stuck again. IV in place flushes easily, returns blood and is not infiltrated or irritated but patient complained of burning during med administration. No further complaints at this time.

## 2023-09-20 VITALS
HEART RATE: 101 BPM | HEIGHT: 62 IN | RESPIRATION RATE: 17 BRPM | SYSTOLIC BLOOD PRESSURE: 133 MMHG | DIASTOLIC BLOOD PRESSURE: 89 MMHG | WEIGHT: 90.4 LBS | BODY MASS INDEX: 16.63 KG/M2 | TEMPERATURE: 97.7 F | OXYGEN SATURATION: 92 %

## 2023-09-20 LAB
ANION GAP SERPL CALCULATED.3IONS-SCNC: 9 MMOL/L (ref 5–15)
BUN SERPL-MCNC: 30 MG/DL (ref 8–23)
BUN/CREAT SERPL: 54.5 (ref 7–25)
CALCIUM SPEC-SCNC: 9.1 MG/DL (ref 8.6–10.5)
CHLORIDE SERPL-SCNC: 97 MMOL/L (ref 98–107)
CO2 SERPL-SCNC: 24 MMOL/L (ref 22–29)
CREAT SERPL-MCNC: 0.55 MG/DL (ref 0.57–1)
DEPRECATED RDW RBC AUTO: 48.7 FL (ref 37–54)
EGFRCR SERPLBLD CKD-EPI 2021: 96.3 ML/MIN/1.73
ERYTHROCYTE [DISTWIDTH] IN BLOOD BY AUTOMATED COUNT: 14.1 % (ref 12.3–15.4)
GLUCOSE SERPL-MCNC: 94 MG/DL (ref 65–99)
HCT VFR BLD AUTO: 31 % (ref 34–46.6)
HGB BLD-MCNC: 10.7 G/DL (ref 12–15.9)
MCH RBC QN AUTO: 32.3 PG (ref 26.6–33)
MCHC RBC AUTO-ENTMCNC: 34.5 G/DL (ref 31.5–35.7)
MCV RBC AUTO: 93.7 FL (ref 79–97)
PLATELET # BLD AUTO: 462 10*3/MM3 (ref 140–450)
PMV BLD AUTO: 8.9 FL (ref 6–12)
POTASSIUM SERPL-SCNC: 4.8 MMOL/L (ref 3.5–5.2)
RBC # BLD AUTO: 3.31 10*6/MM3 (ref 3.77–5.28)
SODIUM SERPL-SCNC: 130 MMOL/L (ref 136–145)
WBC NRBC COR # BLD: 14.32 10*3/MM3 (ref 3.4–10.8)

## 2023-09-20 PROCEDURE — 99239 HOSP IP/OBS DSCHRG MGMT >30: CPT | Performed by: INTERNAL MEDICINE

## 2023-09-20 PROCEDURE — 94799 UNLISTED PULMONARY SVC/PX: CPT

## 2023-09-20 PROCEDURE — 80048 BASIC METABOLIC PNL TOTAL CA: CPT | Performed by: FAMILY MEDICINE

## 2023-09-20 PROCEDURE — 94664 DEMO&/EVAL PT USE INHALER: CPT

## 2023-09-20 PROCEDURE — 63710000001 PREDNISONE PER 1 MG: Performed by: FAMILY MEDICINE

## 2023-09-20 PROCEDURE — 85027 COMPLETE CBC AUTOMATED: CPT | Performed by: FAMILY MEDICINE

## 2023-09-20 PROCEDURE — 97530 THERAPEUTIC ACTIVITIES: CPT

## 2023-09-20 PROCEDURE — 97110 THERAPEUTIC EXERCISES: CPT

## 2023-09-20 RX ORDER — IPRATROPIUM BROMIDE AND ALBUTEROL SULFATE 2.5; .5 MG/3ML; MG/3ML
3 SOLUTION RESPIRATORY (INHALATION) EVERY 4 HOURS PRN
Qty: 360 ML
Start: 2023-09-20

## 2023-09-20 RX ORDER — BUDESONIDE 0.5 MG/2ML
0.5 INHALANT ORAL
Start: 2023-09-20

## 2023-09-20 RX ORDER — LIDOCAINE 4 G/G
1 PATCH TOPICAL
Start: 2023-09-21

## 2023-09-20 RX ORDER — BENZONATATE 200 MG/1
200 CAPSULE ORAL 3 TIMES DAILY PRN
Start: 2023-09-20

## 2023-09-20 RX ORDER — OXCARBAZEPINE 600 MG/1
600 TABLET, FILM COATED ORAL NIGHTLY
Start: 2023-09-20

## 2023-09-20 RX ORDER — AMLODIPINE BESYLATE 10 MG/1
10 TABLET ORAL
Start: 2023-09-21

## 2023-09-20 RX ORDER — TIZANIDINE 4 MG/1
4 TABLET ORAL NIGHTLY PRN
Start: 2023-09-20

## 2023-09-20 RX ORDER — PREDNISONE 20 MG/1
20 TABLET ORAL
Qty: 1 TABLET | Refills: 0
Start: 2023-09-21 | End: 2023-09-22

## 2023-09-20 RX ORDER — PREDNISONE 20 MG/1
20 TABLET ORAL
Status: DISCONTINUED | OUTPATIENT
Start: 2023-09-21 | End: 2023-09-20 | Stop reason: HOSPADM

## 2023-09-20 RX ORDER — IPRATROPIUM BROMIDE AND ALBUTEROL SULFATE 2.5; .5 MG/3ML; MG/3ML
3 SOLUTION RESPIRATORY (INHALATION)
Qty: 360 ML
Start: 2023-09-20

## 2023-09-20 RX ADMIN — DOXYCYCLINE 100 MG: 100 CAPSULE ORAL at 08:11

## 2023-09-20 RX ADMIN — IPRATROPIUM BROMIDE AND ALBUTEROL SULFATE 3 ML: 2.5; .5 SOLUTION RESPIRATORY (INHALATION) at 07:21

## 2023-09-20 RX ADMIN — PREDNISONE 40 MG: 20 TABLET ORAL at 08:10

## 2023-09-20 RX ADMIN — AMLODIPINE BESYLATE 10 MG: 10 TABLET ORAL at 08:11

## 2023-09-20 RX ADMIN — BUDESONIDE 0.5 MG: 0.5 INHALANT RESPIRATORY (INHALATION) at 07:21

## 2023-09-20 RX ADMIN — CYANOCOBALAMIN TAB 1000 MCG 500 MCG: 1000 TAB at 08:10

## 2023-09-20 RX ADMIN — HYDROCODONE BITARTRATE AND ACETAMINOPHEN 1 TABLET: 10; 325 TABLET ORAL at 04:59

## 2023-09-20 RX ADMIN — LIDOCAINE 1 PATCH: 560 PATCH PERCUTANEOUS; TOPICAL; TRANSDERMAL at 08:12

## 2023-09-20 NOTE — THERAPY TREATMENT NOTE
Patient Name: Angela Diaz Parent  : 1950    MRN: [id number]                              Today's Date: 2023       Admit Date: 2023    Visit Dx:     ICD-10-CM ICD-9-CM   1. Acute on chronic respiratory failure with hypoxia  J96.21 518.84     799.02   2. COPD exacerbation  J44.1 491.21   3. Hyponatremia  E87.1 276.1   4. Bandemia  D72.825 288.66     Patient Active Problem List   Diagnosis    Essential hypertension    Palpitations    Other forms of angina pectoris    CKD (chronic kidney disease) stage 2, GFR 60-89 ml/min    MS (multiple sclerosis)    Mild aortic regurgitation    Chronic constipation    MINDI (generalized anxiety disorder)    Polyp of colon    Hepatitis C antibody test positive    Neuropathic pain    Prediabetes    GERD without esophagitis    Hyperglycemia    Closed trimalleolar fracture, left, initial encounter    History of tobacco use    Weight loss    Chronic fatigue    Cough    Lower abdominal pain    Reactive depression    Medication monitoring encounter    Anxiety    Left lower quadrant abdominal pain    Impaired mobility    Bilious vomiting with nausea    COPD (chronic obstructive pulmonary disease)    Right upper quadrant pain    COPD with acute exacerbation    Hyponatremia    Leukocytosis    Acute-on-chronic respiratory failure    Severe malnutrition     Past Medical History:   Diagnosis Date    Acid reflux     Anxiety     Anxiety     Arthritis     Cataract     CKD (chronic kidney disease) stage 2, GFR 60-89 ml/min 2018    Colon polyp     Depression     GERD (gastroesophageal reflux disease)     Headache     Heart murmur     History of blood transfusion 1971    after surgery     Hypertension 2018    Infectious viral hepatitis     Mild aortic regurgitation 2019    MS (multiple sclerosis)     Pneumonia     Prediabetes 2019     Past Surgical History:   Procedure Laterality Date    ANKLE OPEN REDUCTION INTERNAL FIXATION Left 2019    Procedure: ANKLE OPEN  REDUCTION INTERNAL FIXATION;  Surgeon: Stan Lozoya MD;  Location: [facility];  Service: Orthopedics    CATARACT EXTRACTION  2009    CERVICAL BIOPSY  1971    benign    COLONOSCOPY      HEMORRHOIDECTOMY  1971    TUBAL ABDOMINAL LIGATION  1985      General Information       Row Name 09/20/23 1047          OT Time and Intention    Document Type therapy note (daily note)  -KANG     Mode of Treatment individual therapy;occupational therapy  -KANG       Row Name 09/20/23 1047          General Information    Patient Profile Reviewed yes  -KANG     Existing Precautions/Restrictions fall;oxygen therapy device and L/min  -KANG     Barriers to Rehab medically complex;previous functional deficit  -KANG       Row Name 09/20/23 1047          Cognition    Orientation Status (Cognition) oriented x 3  -KANG       Row Name 09/20/23 1047          Safety Issues, Functional Mobility    Safety Issues Affecting Function (Mobility) safety precaution awareness  -KANG     Impairments Affecting Function (Mobility) balance;endurance/activity tolerance;pain;shortness of breath;strength;motor control  -KANG               User Key  (r) = Recorded By, (t) = Taken By, (c) = Cosigned By      Initials Name Provider Type    Cora Garza, ARIELLE Occupational Therapist                     Mobility/ADL's       Row Name 09/20/23 1048          Bed Mobility    Supine-Sit [place] (Bed Mobility) standby assist  -KANG     Sit-Supine [place] (Bed Mobility) standby assist  -KANG     Assistive Device (Bed Mobility) bed rails  -KANG     Comment, (Bed Mobility) assist for lines, rest periods needed between mvmt  -KANG       Row Name 09/20/23 1048          Transfers    Transfers sit-stand transfer;stand-sit transfer;bed-chair transfer  -       Row Name 09/20/23 1048          Bed-Chair Transfer    Bed-Chair [place] (Transfers) minimum assist (75% patient effort)  -KANG     Assistive Device (Bed-Chair Transfers) other (see comments)  -KANG     Comment, (Bed-Chair  Transfer) UE support  -[facility] Name 09/20/23 1048          Sit-Stand Transfer    Sit-Stand [place] (Transfers) contact guard  -     Assistive Device (Sit-Stand Transfers) walker, front-wheeled  -KANG       Row Name 09/20/23 1048          Stand-Sit Transfer    Stand-Sit [place] (Transfers) contact guard  -KANG     Assistive Device (Stand-Sit Transfers) walker, front-wheeled  -[facility] Name 09/20/23 1048          Functional Mobility    Functional Mobility- Ind. Level contact guard assist  -     Functional Mobility- Device walker, front-wheeled  -     Functional Mobility-Distance (Feet) 12  -KANG     Functional Mobility- Safety Issues step length decreased;supplemental O2  -     Functional Mobility- Comment 2 standing rest periods needed, pt.'s LE's with limited motor control  -[facility] Name 09/20/23 1048          Activities of Daily Living    BADL Assessment/Intervention lower body dressing;grooming  -KANG       Row Name 09/20/23 1048          Lower Body Dressing Assessment/Training    Comment, (Lower Body Dressing) pt. declined donning undergarment due to fatigue  -[facility] Name 09/20/23 1048          Grooming Assessment/Training    [place] Level (Grooming) hair care, combing/brushing;wash face, hands;set up  -     Position (Grooming) unsupported sitting  -     Comment, (Grooming) pt. wanted to use shower cap, gotten, but per pt. to fatigued to use at this time  -               User Key  (r) = Recorded By, (t) = Taken By, (c) = Cosigned By      Initials Name Provider Type     Cora Hi OT Occupational Therapist                   Obj/Interventions       [facility] Name 09/20/23 1050          Shoulder (Therapeutic Exercise)    Shoulder AROM (Therapeutic Exercise) bilateral;flexion;extension;aBduction;aDduction;horizontal aBduction/aDduction;sitting;5 repetitions  -[facility] Name 09/20/23 1050          Elbow/Forearm (Therapeutic Exercise)    Elbow/Forearm Strengthening  (Therapeutic Exercise) bilateral;flexion;extension;10 repetitions;supine  minimal manual resistance  -KANG       Row Name 09/20/23 1050          Motor Skills    Therapeutic Exercise shoulder;elbow/forearm  -KANG       Row Name 09/20/23 1050          Balance    Static Sitting Balance independent  -KANG     Dynamic Sitting Balance independent  grooming/UE TE  -KANG     Position, Sitting Balance unsupported;sitting in chair  -KANG     Static Standing Balance contact guard  -KANG     Dynamic Standing Balance contact guard  -KANG     Position/Device Used, Standing Balance supported;walker, front-wheeled  -KANG     Balance Interventions sit to stand;occupation based/functional task  -KANG     Comment, Balance grooming, UE TE  -KANG               User Key  (r) = Recorded By, (t) = Taken By, (c) = Cosigned By      Initials Name Provider Type    Cora Garza OT Occupational Therapist                   Goals/Plan       Row Name 09/20/23 1055          Transfer Goal 1 (OT)    Progress/Outcome (Transfer Goal 1, OT) continuing progress toward goal  -KANG       Row Name 09/20/23 1055          Toileting Goal 1 (OT)    Progress/Outcome (Toileting Goal 1, OT) goal ongoing  -KANG       Row Name 09/20/23 1055          Grooming Goal 1 (OT)    Progress/Outcome (Grooming Goal 1, OT) progress slower than expected  -KANG               User Key  (r) = Recorded By, (t) = Taken By, (c) = Cosigned By      Initials Name Provider Type    Cora Garza, OT Occupational Therapist                   Clinical Impression       Row Name 09/20/23 1052          Pain Assessment    Pretreatment Pain Rating 4/10  -KANG     Posttreatment Pain Rating 4/10  -KANG     Pain Location - Side/Orientation Bilateral  -KANG     Pain Location - chest;back  -KANG     Pre/Posttreatment Pain Comment per pt. soreness from coughing, pt. with pain patch on and reported taking pain meds recently  -KANG     Pain Intervention(s) Ambulation/increased activity;Repositioned;Medication (See MAR)  -KANG        Row Name 09/20/23 1052          Plan of Care Review    Plan of Care Reviewed With patient  -KANG     Progress improving  -KANG     Outcome Evaluation Pt. with improved activity tolerance from yesterday with ability to do some activity OOB, but still with overall poor activity tolerance.  Pt. needed many sitting and standing rest periods throughout session.  Limited PLB ability to due to makes pt. cough.  Pt. remains appropriate for skilled IP OT services due to pt. much below baseline independence level ADLs.  -       Row Name 09/20/23 1052          Therapy Plan Review/Discharge Plan (OT)    Anticipated Discharge Disposition (OT) inpatient rehabilitation facility  -       Row Name 09/20/23 1052          Vital Signs    Pre Systolic BP Rehab 133  -KANG     Pre Treatment Diastolic BP 89  -KANG     Pretreatment Heart Rate (beats/min) 86  -KANG     Posttreatment Heart Rate (beats/min) 94  -KANG     Pre SpO2 (%) 994  -KANG     O2 Delivery Pre Treatment nasal cannula  -KANG     O2 Delivery Intra Treatment nasal cannula  -KANG     Post SpO2 (%) 94  -KANG     O2 Delivery Post Treatment nasal cannula  -KANG     Pre Patient Position Supine  -KANG     Intra Patient Position Standing  -KANG     Post Patient Position Supine  -KANG       Row Name 09/20/23 1052          Positioning and Restraints    Pre-Treatment Position in bed  -KANG     Post Treatment Position bed  -KANG     In Bed supine;call light within reach;encouraged to call for assist;exit alarm on;side rails up x2  -KANG               User Key  (r) = Recorded By, (t) = Taken By, (c) = Cosigned By      Initials Name Provider Type    Cora Garza, OT Occupational Therapist                   Outcome Measures       Row Name 09/20/23 1055          How much help from another is currently needed...    Putting on and taking off regular lower body clothing? 3  excess time for all tasks with multiple seated rest periods all tasks  -KANG     Bathing (including washing, rinsing, and drying) 3  -KAGN      Toileting (which includes using toilet bed pan or urinal) 3  -KANG     Putting on and taking off regular upper body clothing 3  -KANG     Taking care of personal grooming (such as brushing teeth) 3  -KANG     Eating meals 4  -KANG     AM-PAC 6 Clicks Score (OT) 19  -KANG       Row Name 09/20/23 1055          Functional Assessment    Outcome Measure Options AM-PAC 6 Clicks Daily Activity (OT)  -KANG               User Key  (r) = Recorded By, (t) = Taken By, (c) = Cosigned By      Initials Name Provider Type    Cora Garza, OT Occupational Therapist                    Occupational Therapy Education       Title: PT OT SLP Therapies (Done)       Topic: Occupational Therapy (Done)       Point: ADL training (Done)       Description:   Instruct learner(s) on proper safety adaptation and remediation techniques during self care or transfers.   Instruct in proper use of assistive devices.                  Learning Progress Summary             Patient Acceptance, E,D, VU,NR by  at 9/20/2023 1056    Comment: transfer safety, benefit of activity and PLB, UE TE    Acceptance, E,D, NR by  at 9/19/2023 1335    Comment: tranfer technique, UE TE    Acceptance, E, NR by  at 9/14/2023 1012                         Point: Home exercise program (Done)       Description:   Instruct learner(s) on appropriate technique for monitoring, assisting and/or progressing therapeutic exercises/activities.                  Learning Progress Summary             Patient Acceptance, E,D, VU,NR by  at 9/20/2023 1056    Comment: transfer safety, benefit of activity and PLB, UE TE    Acceptance, E,D, NR by  at 9/19/2023 1335    Comment: tranfer technique, UE TE                         Point: Precautions (Done)       Description:   Instruct learner(s) on prescribed precautions during self-care and functional transfers.                  Learning Progress Summary             Patient Acceptance, E,D, VU,NR by  at 9/20/2023 1056    Comment: transfer  safety, benefit of activity and PLB, UE TE    Acceptance, E,D, NR by  at 9/19/2023 1335    Comment: tranfer technique, UE TE    Acceptance, E, NR by  at 9/14/2023 1012                         Point: Body mechanics (Done)       Description:   Instruct learner(s) on proper positioning and spine alignment during self-care, functional mobility activities and/or exercises.                  Learning Progress Summary             Patient Acceptance, E,D, VU,NR by  at 9/20/2023 1056    Comment: transfer safety, benefit of activity and PLB, UE TE    Acceptance, E, NR by  at 9/14/2023 1012                                         User Key       Initials Effective Dates Name Provider Type Discipline     07/11/23 -  Cora Hi, OT Occupational Therapist OT     10/14/22 -  Candida Deutsch OT Occupational Therapist OT                  OT Recommendation and Plan  Therapy Frequency (OT): daily  Plan of Care Review  Plan of Care Reviewed With: patient  Progress: improving  Outcome Evaluation: Pt. with improved activity tolerance from yesterday with ability to do some activity OOB, but still with overall poor activity tolerance.  Pt. needed many sitting and standing rest periods throughout session.  Limited PLB ability to due to makes pt. cough.  Pt. remains appropriate for skilled IP OT services due to pt. much below baseline independence level ADLs.     Time Calculation:         Time Calculation- OT       Row Name 09/20/23 1057             Time Calculation- OT    OT Start Time 1019  -KANG      OT Received On 09/20/23  -KANG      OT Goal Re-Cert Due Date 09/24/23  -KANG         Timed Charges    23588 - OT Therapeutic Exercise Minutes 8  -KANG      07470 - OT Therapeutic Activity Minutes 8  -KANG      20690 - OT Self Care/Mgmt Minutes 8  -KANG         Total Minutes    Timed Charges Total Minutes 24  -KANG       Total Minutes 24  -KANG                User Key  (r) = Recorded By, (t) = Taken By, (c) = Cosigned By      Initials Name  Provider Type    Cora Garza OT Occupational Therapist                  Therapy Charges for Today       Code Description Service Date Service Provider Modifiers Qty    96016759535 HC OT THER PROC EA 15 MIN 9/19/2023 Cora Hi, OT GO 1    57641574185 HC OT THER PROC EA 15 MIN 9/20/2023 Cora Hi, OT GO 1    68701687711 HC OT THERAPEUTIC ACT EA 15 MIN 9/20/2023 Cora Hi OT GO 1                 Cora Hi OT  9/20/2023

## 2023-09-20 NOTE — PROGRESS NOTES
NN spoke with pt at .  Pt alert and able to answer questions appropriately. Pt O2 sat 94% on  1 L currently, home O2 use  3L. COPD action plan reviewed. Deep breathing exercises encouraged. Patient declines pulmonary referral at this time.  No new concerns or questions voiced at this time.  NN will continue to follow as needed.       Per current [employer] Standards, please consider: No LAMA/LABA/ICS in place, Outpatient PFT, Rehab as appropriate, Palliative Care consult, NRT at [place], Annual LDCT per current screening guidelines (age 50-80 years old, smoking history of 20 pack years or more or has quit within past 15 years)

## 2023-09-20 NOTE — DISCHARGE PLACEMENT REQUEST
"Angela Warren (72 y.o. Female)     To [facility](CVA2 acute)  From Janette( at [facility]) [phone]      Date of Birth   1950    Social Security Number       Address   [address] [place] [address]    Home Phone   [phone]    MRN   [id number]       Nondenominational   None    Marital Status                               Admission Date   9/13/23    Admission Type   Emergency    Admitting Provider   Nolan Springer MD    Attending Provider   Nolan Springer MD    Department, Room/Bed   [facility] 6A, N610/1       Discharge Date       Discharge Disposition   Skilled Nursing Facility (DC - External)    Discharge Destination                                 Attending Provider: Nolan Springer MD    Allergies: Penicillins, Lisinopril    Isolation: None   Infection: None   Code Status: CPR    Ht: 157.5 cm (62\")   Wt: 41 kg (90 lb 6.4 oz)    Admission Cmt: None   Principal Problem: COPD with acute exacerbation [J44.1]                   Active Insurance as of 9/13/2023       Primary Coverage       Payor Plan Insurance Group Employer/Plan Group    MEDICARE MEDICARE A & B        Payor Plan Address Payor Plan Phone Number Payor Plan Fax Number Effective Dates    [address] [phone]  2/1/2009 - None Entered    [facility] [id number]         Subscriber Name Subscriber Birth Date Member ID       ANGELA WARREN 1950 [id number]               Secondary Coverage       Payor Plan Insurance Group Employer/Plan Group    [place]        Payor Plan Address Payor Plan Phone Number Payor Plan Fax Number Effective Dates    [address] [phone]  1/4/2018 - None Entered    [place] [address]         Subscriber Name Subscriber Birth Date Member ID       ANGELA WARREN 1950 [id number]                     Emergency Contacts        (Rel.) Home Phone Work Phone Mobile Phone    Anselmo Warren (Son) -- -- [phone]               "   Discharge Summary        Nolan Springer MD at 23 1055              [facility] Medicine Services  DISCHARGE SUMMARY    Patient Name: Angela Warren  : 1950  MRN: [id number]    Date of Admission: 2023 12:25 PM  Date of Discharge:  2023  Primary Care Physician: Rosalba Howard APRN    Consults       No orders found from 8/15/2023 to 2023.            Hospital Course     Presenting Problem:     Active Hospital Problems    Diagnosis  POA    **COPD with acute exacerbation [J44.1]  Yes    Severe malnutrition [E43]  Yes    Hyponatremia [E87.1]  Unknown    Leukocytosis [D72.829]  Unknown    Acute-on-chronic respiratory failure [J96.20]  Yes    History of tobacco use [Z87.891]  Not Applicable    Hepatitis C antibody test positive [R76.8]  Yes    MINDI (generalized anxiety disorder) [F41.1]  Yes    Chronic constipation [K59.09]  Yes    MS (multiple sclerosis) [G35]  Yes    Essential hypertension [I10]  Yes      Resolved Hospital Problems   No resolved problems to display.      --------------------------final diagnoses-------------------------------  Acute exacerbation of COPD  Acute hypoxic resp failure (due to above)  Hyponatremia, improved  Multiple Sclerosis  -follows w/ Dr. Petit of neurology, keep follow up appointment  HTN  Chronic constipation  Chronic Hep C ab  Tobacco abuse  -quit in 2023  Weight loss  -30 lbs in year   -c-scope : single polyp removed (tubular adenoma) from transverse colon, recommended repeat scope 3 years  -gb u/s : intra and extra hepatic lori duct dilation  -mrcp : mild intra and extra hepatic lori duct dilation, no stones noted  -ct chest negative for malignancy  -recommended follow up w/ GI for egd/c-scope in future once over this illnes and out of rehab        Hospital Course:  Angela Warren is a 72 y.o. female former smoker (quit 2023), w/ copd, hep c, MS who presented w/ dyspnea and treated for copd  exacerbation, completed 7 days empiric doxycycline, now weaned off oxygen. Weaning steroids to prednisone 20mg po daily x 3 more days, overall feeling improved. Of note pt claims has lost ~30 lbs over a year without intent. My partner discussed recommendation to follow up w/ GI as outpatient for follow up c-scope (previous in 2021 had tubular adenoma and recommended repeat endoscopy in 3 years) to investigate this further      Discharge Follow Up Recommendations for outpatient labs/diagnostics:  Pcp 1 week after discharge (recommend referral to GI due to weight loss, unintentional, prior c-scope 2021 w/ tubular adenoma)    Day of Discharge     HPI:   Breathing improved. No chest pain. No n/v    Review of Systems  No n/v    Vital Signs:   Temp:  [97.6 °F (36.4 °C)-98.3 °F (36.8 °C)] 97.7 °F (36.5 °C)  Heart Rate:  [] 70  Resp:  [17-18] 17  BP: ()/(56-89) 133/89  Flow (L/min):  [1-2] 1      Physical Exam:  Constitutional:Alert, oriented x 3, nontoxic appearing  Psych:Normal/appropriate affect  HEENT:NCAT, oropharynx clear  Neck: neck supple, full range of motion  Neuro: Face symmetric, speech clear, equal , moves all extremities  Cardiac: RRR; No pretibial pitting edema  Resp: prolonged exp phase bilaterally, no overt wheezes today, normal resp effort  GI: abd soft, nontender  Skin: No extremity rash  Musculoskeletal/extremities: no cyanosis of extremities; no significant ankle edema          Pertinent  and/or Most Recent Results     LAB RESULTS:      Lab 09/20/23  0439 09/19/23  0430 09/18/23  0405 09/17/23  0338 09/14/23  0308 09/13/23  1232   WBC 14.32* 14.35* 16.44* 14.26* 15.38* 19.20*   HEMOGLOBIN 10.7* 11.2* 11.3* 10.7* 11.4* 12.9   HEMATOCRIT 31.0* 32.7* 33.3* 32.5* 32.7* 37.8   PLATELETS 462* 480* 524* 473* 482* 555*   NEUTROS ABS  --  10.91*  --  11.68*  --  15.58*   IMMATURE GRANS (ABS)  --  0.52*  --  0.37*  --  0.30*   LYMPHS ABS  --  1.79  --  1.51  --  1.70   MONOS ABS  --  1.07*  --   0.67  --  1.55*   EOS ABS  --  0.00  --  0.00  --  0.03   MCV 93.7 93.2 93.3 95.9 93.2 93.8   PROCALCITONIN  --   --   --   --   --  0.22   LACTATE  --   --   --   --   --  1.4         Lab 09/20/23  0439 09/19/23  0430 09/18/23  0405 09/17/23  0338 09/14/23  0308   SODIUM 130* 129* 130* 129* 127*   POTASSIUM 4.8 5.4* 5.3* 4.8 4.8   CHLORIDE 97* 94* 97* 97* 90*   CO2 24.0 26.0 23.0 22.0 25.0   ANION GAP 9.0 9.0 10.0 10.0 12.0   BUN 30* 32* 30* 30* 13   CREATININE 0.55* 0.62 0.70 0.78 0.56*   EGFR 96.3 93.6 90.9 79.8 95.9   GLUCOSE 94 118* 134* 216* 99   CALCIUM 9.1 9.0 9.2 9.0 9.2   HEMOGLOBIN A1C  --   --   --   --  5.70*   TSH  --   --   --   --  0.665         Lab 09/19/23  0430 09/18/23  0405 09/13/23  1232   TOTAL PROTEIN 6.1 6.2 7.3   ALBUMIN 3.4* 3.4* 3.8   GLOBULIN 2.7 2.8 3.5   ALT (SGPT) 89* 76* 15   AST (SGOT) 36* 43* 25   BILIRUBIN 0.3 0.2 0.3   ALK PHOS 100 106 148*         Lab 09/13/23  1232   PROBNP 565.0   HSTROP T 21*         Lab 09/14/23  0308   CHOLESTEROL 152   LDL CHOL 92   HDL CHOL 45   TRIGLYCERIDES 76             Brief Urine Lab Results  (Last result in the past 365 days)        Color   Clarity   Blood   Leuk Est   Nitrite   Protein   CREAT   Urine HCG        09/13/23 1837 Yellow   Clear   Negative   Negative   Negative   100 mg/dL (2+)                 Microbiology Results (last 10 days)       Procedure Component Value - Date/Time    Respiratory Culture - Sputum, Cough [[id number]] Collected: 09/15/23 1806    Lab Status: Final result Specimen: Sputum from Cough Updated: 09/15/23 1903     Respiratory Culture Rejected     Gram Stain Moderate (3+) Epithelial cells per low power field      Moderate (3+) WBCs per low power field      Many (4+) Gram positive cocci in chains      Few (2+) Gram negative bacilli    Narrative:      Specimen rejected due to oropharyngeal contamination. Please reorder and recollect specimen if clinically necessary.    Respiratory Panel PCR w/COVID-19(SARS-CoV-2)  ALEXANDER/GHANSHYAM/LAURENT/PAD/COR/MAD/ZULLY In-House, NP Swab in UTM/VTM, 3-4 HR TAT - Swab, Nasopharynx [[id number]]  (Normal) Collected: 09/15/23 0545    Lab Status: Final result Specimen: Swab from Nasopharynx Updated: 09/15/23 0635     ADENOVIRUS, PCR Not Detected     Coronavirus 229E Not Detected     Coronavirus HKU1 Not Detected     Coronavirus NL63 Not Detected     Coronavirus OC43 Not Detected     COVID19 Not Detected     Human Metapneumovirus Not Detected     Human Rhinovirus/Enterovirus Not Detected     Influenza A PCR Not Detected     Influenza B PCR Not Detected     Parainfluenza Virus 1 Not Detected     Parainfluenza Virus 2 Not Detected     Parainfluenza Virus 3 Not Detected     Parainfluenza Virus 4 Not Detected     RSV, PCR Not Detected     Bordetella pertussis pcr Not Detected     Bordetella parapertussis PCR Not Detected     Chlamydophila pneumoniae PCR Not Detected     Mycoplasma pneumo by PCR Not Detected    Narrative:      In the setting of a positive respiratory panel with a viral infection PLUS a negative procalcitonin without other underlying concern for bacterial infection, consider observing off antibiotics or discontinuation of antibiotics and continue supportive care. If the respiratory panel is positive for atypical bacterial infection (Bordetella pertussis, Chlamydophila pneumoniae, or Mycoplasma pneumoniae), consider antibiotic de-escalation to target atypical bacterial infection.    MRSA Screen, PCR (Inpatient) - Swab, Nares [[id number]]  (Normal) Collected: 09/14/23 0601    Lab Status: Final result Specimen: Swab from Nares Updated: 09/14/23 0910     MRSA PCR Negative    Narrative:      The negative predictive value of this diagnostic test is high and should only be used to consider de-escalating anti-MRSA therapy. A positive result may indicate colonization with MRSA and must be correlated clinically.  MRSA Negative    S. Pneumo Ag Urine or CSF - Urine, Urine, Clean Catch [[id number]]  (Normal)  Collected: 09/13/23 1837    Lab Status: Final result Specimen: Urine, Clean Catch Updated: 09/14/23 0004     Strep Pneumo Ag Negative    Legionella Antigen, Urine - Urine, Urine, Clean Catch [[id number]]  (Normal) Collected: 09/13/23 1837    Lab Status: Final result Specimen: Urine, Clean Catch Updated: 09/14/23 0004     LEGIONELLA ANTIGEN, URINE Negative    COVID PRE-OP / PRE-PROCEDURE SCREENING ORDER (NO ISOLATION) - Swab, Nasopharynx [[id number]]  (Normal) Collected: 09/13/23 1314    Lab Status: Final result Specimen: Swab from Nasopharynx Updated: 09/13/23 1417    Narrative:      The following orders were created for panel order COVID PRE-OP / PRE-PROCEDURE SCREENING ORDER (NO ISOLATION) - Swab, Nasopharynx.  Procedure                               Abnormality         Status                     ---------                               -----------         ------                     COVID-19 and FLU A/B PCR...[[id number]]  Normal              Final result                 Please view results for these tests on the individual orders.    COVID-19 and FLU A/B PCR - Swab, Nasopharynx [[id number]]  (Normal) Collected: 09/13/23 1314    Lab Status: Final result Specimen: Swab from Nasopharynx Updated: 09/13/23 1417     COVID19 Not Detected     Influenza A PCR Not Detected     Influenza B PCR Not Detected    Narrative:      Fact sheet for providers: https://www.fda.gov/media/483359/download    Fact sheet for patients: https://www.fda.gov/media/882430/download    Test performed by PCR.    Blood Culture - Blood, Wrist, Left [[id number]]  (Normal) Collected: 09/13/23 1313    Lab Status: Final result Specimen: Blood from Wrist, Left Updated: 09/18/23 1400     Blood Culture No growth at 5 days    Blood Culture - Blood, Arm, Right [[id number]]  (Normal) Collected: 09/13/23 1309    Lab Status: Final result Specimen: Blood from Arm, Right Updated: 09/18/23 1400     Blood Culture No growth at 5 days            CT Chest Without Contrast  Diagnostic    Result Date: 9/17/2023  CT CHEST WO CONTRAST DIAGNOSTIC Date of Exam: 9/17/2023 12:55 PM EDT Indication: weight loss, COPD, smoker, rule out malignancy. Comparison: None available. Technique: Axial CT images were obtained of the chest without contrast administration.  Reconstructed coronal and sagittal images were also obtained. Automated exposure control and iterative construction methods were used. Findings: There is linear scarring in the lungs. There are tree-in-bud nodules in the dorsal aspect of the left lower lobe and within the right lower lobe periphery and right middle lobe. There is mild diffuse peribronchial thickening. Airways are patent. No suspicious lung nodules. No pneumothorax, pleural effusion or lobar consolidation. There is moderate emphysema. The thyroid, trachea and esophagus appear within normal limits. Heart size is normal. There are mild aortic atherosclerotic calcifications. There is aneurysmal dilatation of the proximal descending thoracic aorta up to 36 mm. There are no abnormal mediastinal lymph nodes or pericardial effusion. No acute findings in the superficial soft tissues or within the limited images of the upper abdomen. There are no acute osseous abnormalities or destructive bone lesions. There is moderate lower cervical disc degeneration. No significant DJD in the thoracic spine.     Impression: 1.No evidence of malignancy in the chest. 2.Moderate emphysema. 3.Tree-in-bud nodules in the lower lobes and right middle lobe and mild diffuse peribronchial thickening. Findings may reflect bronchitis with superimposed infectious bronchiolitis. 4.Aneurysmal dilatation of the proximal descending thoracic aorta up to 36 mm. 5.Moderate lower cervical disc degeneration. Electronically Signed: Luis E Ellis MD  9/17/2023 9:43 PM EDT  Workstation ID: [id number]    US Gallbladder    Result Date: 9/18/2023  US GALLBLADDER Date of Exam: 9/18/2023 9:24 AM EDT Indication: biliary  dilation on CT. Comparison: CT abdomen pelvis 3/2/2022 Technique: Grayscale and color Doppler ultrasound evaluation of the right upper quadrant was performed. Findings: Liver and hepatic vasculature: Echogenicity is within normal limits. The portal vein and imaged hepatic veins demonstrate normal directional flow and normal waveform on spectral imaging. Gallbladder and biliary: Partially decompressed gallbladder. No gallstones. No gallbladder distention, wall thickening, or pericholecystic fluid. The common bile duct measures 13 mm, similar to prior CT. Right kidney: The right kidney measures 8.0 cm. Prominent right renal pelvis without meena hydronephrosis, similar to prior CT. Pancreas: Visualized portions of the pancreas are unremarkable, though not well evaluated due to overlying bowel gas. Free fluid: There is no significant ascites.     Impression: Intra and extrahepatic biliary ductal dilatation, favored to be similar in caliber compared to 2022 CT considering differences in modality. Electronically Signed: Lazarus Sotelo MD  9/18/2023 10:16 AM EDT  Workstation ID: [id number]    XR Chest 1 View    Result Date: 9/13/2023  XR CHEST 1 VW Date of Exam: 9/13/2023 12:40 PM EDT Indication: SOA triage protocol Comparison: 3/2/2022 Findings: There are no airspace consolidations. There is slight hyperinflation with emphysema. No pleural fluid. No pneumothorax. The pulmonary vasculature appears within normal limits. The cardiac and mediastinal silhouette appear unremarkable. No acute osseous abnormality identified.     Impression: No acute pulmonary process COPD/emphysema Electronically Signed: Lawrence Ocampo MD  9/13/2023 12:51 PM EDT  Workstation ID: [id number]    MRI abdomen wo contrast mrcp    Result Date: 9/19/2023  MRI ABDOMEN WO CONTRAST MRCP Date of Exam: 9/19/2023 10:10 AM EDT Indication: weight loss, biliary ductal dilation.  Comparison: Abdominal ultrasound 9/18/2023, CT abdomen pelvis 3/2/2022. Technique:   Routine multiplanar/multisequence images of the abdomen were obtained with MRCP sequences without contrast administration. Findings: Lower Thorax: No focal consolidation. Liver: Small cysts. No suspicious liver lesions. Gallbladder and bile ducts: Gallbladder is unremarkable. Mild extra and extrahepatic biliary ductal dilatation to the level of the ampulla, similar in caliber dating back to 2022 CT with common bile duct again measuring 13 mm. No filling defects to suggest choledocholithiasis. Pancreas: No pancreatic duct dilation. No surrounding inflammation. Spleen: Spleen is normal size. Adrenal glands: No discrete adrenal nodule. Kidneys: No hydronephrosis. Bilateral renal cysts. Stomach and bowel: No evidence of bowel obstruction. Lymph nodes: No pathologically enlarged lymph nodes. Vasculature: No aneurysmal dilation of the abdominal aorta. Atherosclerosis. Peritoneum and retroperitoneum: No free air or free fluid. Soft tissues: Unremarkable. Osseous structures: No aggressive focal lytic or sclerotic osseous lesions.     Impression: Mild extra and extrahepatic biliary ductal dilatation is similar dating back to 2022. No findings of choledocholithiasis or other obstructive process. Electronically Signed: Lazarus Sotelo MD  9/19/2023 11:49 AM EDT  Workstation ID: [id number]    XR Chest PA & Lateral    Result Date: 9/17/2023  XR CHEST PA AND LATERAL Date of Exam: 9/17/2023 11:00 AM EDT Indication: COPD, SOB follow-up Comparison: CT 9/17/2023. FINDINGS: Mild chronic changes of the lung fields are present without focal airspace opacity. There is no significant pleural effusion or distinct pneumothorax. Normal heart and mediastinal contours.     Mild chronic emphysematous changes of the lung fields without evidence of acute cardiopulmonary abnormality. Electronically Signed: Kalyan Funez MD  9/17/2023 4:51 PM EDT  Workstation ID: [id number]             Results for orders placed during the hospital encounter of  01/03/19    Adult Transthoracic Echo Complete W/ Cont if Necessary Per Protocol    Interpretation Summary  · Left ventricular systolic function is normal.  · Estimated EF appears to be in the range of 61 - 65%.  · Mild aortic valve regurgitation is present.      Plan for Follow-up of Pending Labs/Results:     Discharge Details        Discharge Medications        New Medications        Instructions Start Date   amLODIPine 10 MG tablet  Commonly known as: NORVASC   10 mg, Oral, Every 24 Hours Scheduled   Start Date: September 21, 2023     benzonatate 200 MG capsule  Commonly known as: TESSALON   200 mg, Oral, 3 Times Daily PRN      budesonide 0.5 MG/2ML nebulizer solution  Commonly known as: PULMICORT   0.5 mg, Nebulization, 2 Times Daily - RT      ipratropium-albuterol 0.5-2.5 mg/3 ml nebulizer  Commonly known as: DUO-NEB   3 mL, Nebulization, Every 6 Hours - RT      ipratropium-albuterol 0.5-2.5 mg/3 ml nebulizer  Commonly known as: DUO-NEB   3 mL, Nebulization, Every 4 Hours PRN      Lidocaine 4 %   1 patch, Transdermal, Every 24 Hours Scheduled, Remove & Discard patch within 12 hours or as directed by MD   Start Date: September 21, 2023     pharmacy consult - MTM   Does not apply, Daily      predniSONE 20 MG tablet  Commonly known as: DELTASONE   20 mg, Oral, Daily With Breakfast   Start Date: September 21, 2023            Changes to Medications        Instructions Start Date   OXcarbazepine 600 MG tablet  Commonly known as: TRILEPTAL  What changed:   medication strength  how much to take  when to take this   600 mg, Oral, Nightly      tiZANidine 4 MG tablet  Commonly known as: ZANAFLEX  What changed:   when to take this  reasons to take this   4 mg, Oral, Nightly PRN             Continue These Medications        Instructions Start Date   albuterol sulfate  (90 Base) MCG/ACT inhaler  Commonly known as: PROVENTIL HFA;VENTOLIN HFA;PROAIR HFA   2 puffs, Inhalation, Every 4 Hours PRN       HYDROcodone-acetaminophen  MG per tablet  Commonly known as: NORCO   1 tablet, Oral, Every 6 Hours PRN      mirtazapine 30 MG tablet  Commonly known as: REMERON   30 mg, Oral, Every Night at Bedtime      polyethylene glycol 17 GM/SCOOP powder  Commonly known as: MIRALAX   17 g, Oral, Daily      QUEtiapine 100 MG tablet  Commonly known as: SEROquel   TAKE 1 TABLET BY MOUTH EVERY DAY AT NIGHT      vitamin B-12 500 MCG tablet  Commonly known as: CYANOCOBALAMIN   500 mcg, Oral, Daily, OTC      Zinc 100 MG tablet   1 tablet, Oral, Daily, OTC               Allergies   Allergen Reactions    Penicillins Anaphylaxis    Lisinopril Rash and Cough         Discharge Disposition:  Skilled Nursing Facility (DC - External)    Diet:  Hospital:  Diet Order   Procedures    Diet: Regular/House Diet; Texture: Regular Texture (IDDSI 7); Fluid Consistency: Thin (IDDSI 0)       Activity:             CODE STATUS:    Code Status and Medical Interventions:   Ordered at: 09/13/23 1621     Level Of Support Discussed With:    Patient     Code Status (Patient has no pulse and is not breathing):    CPR (Attempt to Resuscitate)     Medical Interventions (Patient has pulse or is breathing):    Full Support       Future Appointments   Date Time Provider Department Center   9/22/2023  9:15 AM [facility] US 1 BH [facility] US SO [facility]   11/6/2023  2:45 PM eMhdi Petit MD [facility]   1/5/2024  1:30 PM Mehdi Petit MD [facility]       Additional Instructions for the Follow-ups that You Need to Schedule       Discharge Follow-up with PCP   As directed       Currently Documented PCP:    Rosalba Howard APRN    PCP Phone Number:    [phone]     Follow Up Details: 1 week after discharge from rehab                      Nolan Springer MD  09/20/23      Time Spent on Discharge:  I spent  35  minutes on this discharge activity which included: face-to-face encounter with the patient, reviewing the data in the system,  coordination of the care with the nursing staff as well as consultants, documentation, and entering orders.           Electronically signed by Nolan Springer MD at 09/20/23 9009

## 2023-09-20 NOTE — DISCHARGE SUMMARY
[facility] Medicine Services  DISCHARGE SUMMARY    Patient Name: Angela Warren  : 1950  MRN: [id number]    Date of Admission: 2023 12:25 PM  Date of Discharge:  2023  Primary Care Physician: Rosalba Howard APRN    Consults       No orders found from 8/15/2023 to 2023.            Hospital Course     Presenting Problem:     Active Hospital Problems    Diagnosis  POA    **COPD with acute exacerbation [J44.1]  Yes    Severe malnutrition [E43]  Yes    Hyponatremia [E87.1]  Unknown    Leukocytosis [D72.829]  Unknown    Acute-on-chronic respiratory failure [J96.20]  Yes    History of tobacco use [Z87.891]  Not Applicable    Hepatitis C antibody test positive [R76.8]  Yes    MINDI (generalized anxiety disorder) [F41.1]  Yes    Chronic constipation [K59.09]  Yes    MS (multiple sclerosis) [G35]  Yes    Essential hypertension [I10]  Yes      Resolved Hospital Problems   No resolved problems to display.      --------------------------final diagnoses-------------------------------  Acute exacerbation of COPD  Acute hypoxic resp failure (due to above)  Hyponatremia, improved  Multiple Sclerosis  -follows w/ Dr. Petit of neurology, keep follow up appointment  HTN  Chronic constipation  Chronic Hep C ab  Tobacco abuse  -quit in 2023  Weight loss  -30 lbs in year   -c-scope : single polyp removed (tubular adenoma) from transverse colon, recommended repeat scope 3 years  -gb u/s : intra and extra hepatic lori duct dilation  -mrcp : mild intra and extra hepatic lori duct dilation, no stones noted  -ct chest negative for malignancy  -recommended follow up w/ GI for egd/c-scope in future once over this illnes and out of rehab        Hospital Course:  Angela Warren is a 72 y.o. female former smoker (quit 2023), w/ copd, hep c, MS who presented w/ dyspnea and treated for copd exacerbation, completed 7 days empiric doxycycline, now weaned off oxygen.  Weaning steroids to prednisone 20mg po daily x 3 more days, overall feeling improved. Of note pt claims has lost ~30 lbs over a year without intent. My partner discussed recommendation to follow up w/ GI as outpatient for follow up c-scope (previous in 2021 had tubular adenoma and recommended repeat endoscopy in 3 years) to investigate this further      Discharge Follow Up Recommendations for outpatient labs/diagnostics:  Pcp 1 week after discharge (recommend referral to GI due to weight loss, unintentional, prior c-scope 2021 w/ tubular adenoma)    Day of Discharge     HPI:   Breathing improved. No chest pain. No n/v    Review of Systems  No n/v    Vital Signs:   Temp:  [97.6 °F (36.4 °C)-98.3 °F (36.8 °C)] 97.7 °F (36.5 °C)  Heart Rate:  [] 70  Resp:  [17-18] 17  BP: ()/(56-89) 133/89  Flow (L/min):  [1-2] 1      Physical Exam:  Constitutional:Alert, oriented x 3, nontoxic appearing  Psych:Normal/appropriate affect  HEENT:NCAT, oropharynx clear  Neck: neck supple, full range of motion  Neuro: Face symmetric, speech clear, equal , moves all extremities  Cardiac: RRR; No pretibial pitting edema  Resp: prolonged exp phase bilaterally, no overt wheezes today, normal resp effort  GI: abd soft, nontender  Skin: No extremity rash  Musculoskeletal/extremities: no cyanosis of extremities; no significant ankle edema          Pertinent  and/or Most Recent Results     LAB RESULTS:      Lab 09/20/23  0439 09/19/23  0430 09/18/23  0405 09/17/23  0338 09/14/23  0308 09/13/23  1232   WBC 14.32* 14.35* 16.44* 14.26* 15.38* 19.20*   HEMOGLOBIN 10.7* 11.2* 11.3* 10.7* 11.4* 12.9   HEMATOCRIT 31.0* 32.7* 33.3* 32.5* 32.7* 37.8   PLATELETS 462* 480* 524* 473* 482* 555*   NEUTROS ABS  --  10.91*  --  11.68*  --  15.58*   IMMATURE GRANS (ABS)  --  0.52*  --  0.37*  --  0.30*   LYMPHS ABS  --  1.79  --  1.51  --  1.70   MONOS ABS  --  1.07*  --  0.67  --  1.55*   EOS ABS  --  0.00  --  0.00  --  0.03   MCV 93.7 93.2 93.3  95.9 93.2 93.8   PROCALCITONIN  --   --   --   --   --  0.22   LACTATE  --   --   --   --   --  1.4         Lab 09/20/23  0439 09/19/23  0430 09/18/23  0405 09/17/23  0338 09/14/23  0308   SODIUM 130* 129* 130* 129* 127*   POTASSIUM 4.8 5.4* 5.3* 4.8 4.8   CHLORIDE 97* 94* 97* 97* 90*   CO2 24.0 26.0 23.0 22.0 25.0   ANION GAP 9.0 9.0 10.0 10.0 12.0   BUN 30* 32* 30* 30* 13   CREATININE 0.55* 0.62 0.70 0.78 0.56*   EGFR 96.3 93.6 90.9 79.8 95.9   GLUCOSE 94 118* 134* 216* 99   CALCIUM 9.1 9.0 9.2 9.0 9.2   HEMOGLOBIN A1C  --   --   --   --  5.70*   TSH  --   --   --   --  0.665         Lab 09/19/23 0430 09/18/23  0405 09/13/23  1232   TOTAL PROTEIN 6.1 6.2 7.3   ALBUMIN 3.4* 3.4* 3.8   GLOBULIN 2.7 2.8 3.5   ALT (SGPT) 89* 76* 15   AST (SGOT) 36* 43* 25   BILIRUBIN 0.3 0.2 0.3   ALK PHOS 100 106 148*         Lab 09/13/23  1232   PROBNP 565.0   HSTROP T 21*         Lab 09/14/23  0308   CHOLESTEROL 152   LDL CHOL 92   HDL CHOL 45   TRIGLYCERIDES 76             Brief Urine Lab Results  (Last result in the past 365 days)        Color   Clarity   Blood   Leuk Est   Nitrite   Protein   CREAT   Urine HCG        09/13/23 1837 Yellow   Clear   Negative   Negative   Negative   100 mg/dL (2+)                 Microbiology Results (last 10 days)       Procedure Component Value - Date/Time    Respiratory Culture - Sputum, Cough [[id number]] Collected: 09/15/23 1806    Lab Status: Final result Specimen: Sputum from Cough Updated: 09/15/23 1903     Respiratory Culture Rejected     Gram Stain Moderate (3+) Epithelial cells per low power field      Moderate (3+) WBCs per low power field      Many (4+) Gram positive cocci in chains      Few (2+) Gram negative bacilli    Narrative:      Specimen rejected due to oropharyngeal contamination. Please reorder and recollect specimen if clinically necessary.    Respiratory Panel PCR w/COVID-19(SARS-CoV-2) ALEXANDER/[facility]/LAURENT/PAD/COR/MAD/ZULLY In-House, NP Swab in UTM/VTM, 3-4 HR TAT - Swab, Nasopharynx  [[id number]]  (Normal) Collected: 09/15/23 0545    Lab Status: Final result Specimen: Swab from Nasopharynx Updated: 09/15/23 0635     ADENOVIRUS, PCR Not Detected     Coronavirus 229E Not Detected     Coronavirus HKU1 Not Detected     Coronavirus NL63 Not Detected     Coronavirus OC43 Not Detected     COVID19 Not Detected     Human Metapneumovirus Not Detected     Human Rhinovirus/Enterovirus Not Detected     Influenza A PCR Not Detected     Influenza B PCR Not Detected     Parainfluenza Virus 1 Not Detected     Parainfluenza Virus 2 Not Detected     Parainfluenza Virus 3 Not Detected     Parainfluenza Virus 4 Not Detected     RSV, PCR Not Detected     Bordetella pertussis pcr Not Detected     Bordetella parapertussis PCR Not Detected     Chlamydophila pneumoniae PCR Not Detected     Mycoplasma pneumo by PCR Not Detected    Narrative:      In the setting of a positive respiratory panel with a viral infection PLUS a negative procalcitonin without other underlying concern for bacterial infection, consider observing off antibiotics or discontinuation of antibiotics and continue supportive care. If the respiratory panel is positive for atypical bacterial infection (Bordetella pertussis, Chlamydophila pneumoniae, or Mycoplasma pneumoniae), consider antibiotic de-escalation to target atypical bacterial infection.    MRSA Screen, PCR (Inpatient) - Swab, Nares [[id number]]  (Normal) Collected: 09/14/23 0601    Lab Status: Final result Specimen: Swab from Nares Updated: 09/14/23 0910     MRSA PCR Negative    Narrative:      The negative predictive value of this diagnostic test is high and should only be used to consider de-escalating anti-MRSA therapy. A positive result may indicate colonization with MRSA and must be correlated clinically.  MRSA Negative    S. Pneumo Ag Urine or CSF - Urine, Urine, Clean Catch [[id number]]  (Normal) Collected: 09/13/23 1837    Lab Status: Final result Specimen: Urine, Clean Catch Updated:  09/14/23 0004     Strep Pneumo Ag Negative    Legionella Antigen, Urine - Urine, Urine, Clean Catch [[id number]]  (Normal) Collected: 09/13/23 1837    Lab Status: Final result Specimen: Urine, Clean Catch Updated: 09/14/23 0004     LEGIONELLA ANTIGEN, URINE Negative    COVID PRE-OP / PRE-PROCEDURE SCREENING ORDER (NO ISOLATION) - Swab, Nasopharynx [[id number]]  (Normal) Collected: 09/13/23 1314    Lab Status: Final result Specimen: Swab from Nasopharynx Updated: 09/13/23 1417    Narrative:      The following orders were created for panel order COVID PRE-OP / PRE-PROCEDURE SCREENING ORDER (NO ISOLATION) - Swab, Nasopharynx.  Procedure                               Abnormality         Status                     ---------                               -----------         ------                     COVID-19 and FLU A/B PCR...[[id number]]  Normal              Final result                 Please view results for these tests on the individual orders.    COVID-19 and FLU A/B PCR - Swab, Nasopharynx [[id number]]  (Normal) Collected: 09/13/23 1314    Lab Status: Final result Specimen: Swab from Nasopharynx Updated: 09/13/23 1417     COVID19 Not Detected     Influenza A PCR Not Detected     Influenza B PCR Not Detected    Narrative:      Fact sheet for providers: https://www.fda.gov/media/557089/download    Fact sheet for patients: https://www.fda.gov/media/364115/download    Test performed by PCR.    Blood Culture - Blood, Wrist, Left [[id number]]  (Normal) Collected: 09/13/23 1313    Lab Status: Final result Specimen: Blood from Wrist, Left Updated: 09/18/23 1400     Blood Culture No growth at 5 days    Blood Culture - Blood, Arm, Right [[id number]]  (Normal) Collected: 09/13/23 1309    Lab Status: Final result Specimen: Blood from Arm, Right Updated: 09/18/23 1400     Blood Culture No growth at 5 days            CT Chest Without Contrast Diagnostic    Result Date: 9/17/2023  CT CHEST WO CONTRAST DIAGNOSTIC Date of Exam: 9/17/2023  12:55 PM EDT Indication: weight loss, COPD, smoker, rule out malignancy. Comparison: None available. Technique: Axial CT images were obtained of the chest without contrast administration.  Reconstructed coronal and sagittal images were also obtained. Automated exposure control and iterative construction methods were used. Findings: There is linear scarring in the lungs. There are tree-in-bud nodules in the dorsal aspect of the left lower lobe and within the right lower lobe periphery and right middle lobe. There is mild diffuse peribronchial thickening. Airways are patent. No suspicious lung nodules. No pneumothorax, pleural effusion or lobar consolidation. There is moderate emphysema. The thyroid, trachea and esophagus appear within normal limits. Heart size is normal. There are mild aortic atherosclerotic calcifications. There is aneurysmal dilatation of the proximal descending thoracic aorta up to 36 mm. There are no abnormal mediastinal lymph nodes or pericardial effusion. No acute findings in the superficial soft tissues or within the limited images of the upper abdomen. There are no acute osseous abnormalities or destructive bone lesions. There is moderate lower cervical disc degeneration. No significant DJD in the thoracic spine.     Impression: 1.No evidence of malignancy in the chest. 2.Moderate emphysema. 3.Tree-in-bud nodules in the lower lobes and right middle lobe and mild diffuse peribronchial thickening. Findings may reflect bronchitis with superimposed infectious bronchiolitis. 4.Aneurysmal dilatation of the proximal descending thoracic aorta up to 36 mm. 5.Moderate lower cervical disc degeneration. Electronically Signed: Luis E Ellis MD  9/17/2023 9:43 PM EDT  Workstation ID: [id number]    US Gallbladder    Result Date: 9/18/2023  US GALLBLADDER Date of Exam: 9/18/2023 9:24 AM EDT Indication: biliary dilation on CT. Comparison: CT abdomen pelvis 3/2/2022 Technique: Grayscale and color Doppler  ultrasound evaluation of the right upper quadrant was performed. Findings: Liver and hepatic vasculature: Echogenicity is within normal limits. The portal vein and imaged hepatic veins demonstrate normal directional flow and normal waveform on spectral imaging. Gallbladder and biliary: Partially decompressed gallbladder. No gallstones. No gallbladder distention, wall thickening, or pericholecystic fluid. The common bile duct measures 13 mm, similar to prior CT. Right kidney: The right kidney measures 8.0 cm. Prominent right renal pelvis without meena hydronephrosis, similar to prior CT. Pancreas: Visualized portions of the pancreas are unremarkable, though not well evaluated due to overlying bowel gas. Free fluid: There is no significant ascites.     Impression: Intra and extrahepatic biliary ductal dilatation, favored to be similar in caliber compared to 2022 CT considering differences in modality. Electronically Signed: Lazarus Sotelo MD  9/18/2023 10:16 AM EDT  Workstation ID: [id number]    XR Chest 1 View    Result Date: 9/13/2023  XR CHEST 1 VW Date of Exam: 9/13/2023 12:40 PM EDT Indication: SOA triage protocol Comparison: 3/2/2022 Findings: There are no airspace consolidations. There is slight hyperinflation with emphysema. No pleural fluid. No pneumothorax. The pulmonary vasculature appears within normal limits. The cardiac and mediastinal silhouette appear unremarkable. No acute osseous abnormality identified.     Impression: No acute pulmonary process COPD/emphysema Electronically Signed: Lawrence Ocampo MD  9/13/2023 12:51 PM EDT  Workstation ID: [id number]    MRI abdomen wo contrast mrcp    Result Date: 9/19/2023  MRI ABDOMEN WO CONTRAST MRCP Date of Exam: 9/19/2023 10:10 AM EDT Indication: weight loss, biliary ductal dilation.  Comparison: Abdominal ultrasound 9/18/2023, CT abdomen pelvis 3/2/2022. Technique:  Routine multiplanar/multisequence images of the abdomen were obtained with MRCP sequences without  contrast administration. Findings: Lower Thorax: No focal consolidation. Liver: Small cysts. No suspicious liver lesions. Gallbladder and bile ducts: Gallbladder is unremarkable. Mild extra and extrahepatic biliary ductal dilatation to the level of the ampulla, similar in caliber dating back to 2022 CT with common bile duct again measuring 13 mm. No filling defects to suggest choledocholithiasis. Pancreas: No pancreatic duct dilation. No surrounding inflammation. Spleen: Spleen is normal size. Adrenal glands: No discrete adrenal nodule. Kidneys: No hydronephrosis. Bilateral renal cysts. Stomach and bowel: No evidence of bowel obstruction. Lymph nodes: No pathologically enlarged lymph nodes. Vasculature: No aneurysmal dilation of the abdominal aorta. Atherosclerosis. Peritoneum and retroperitoneum: No free air or free fluid. Soft tissues: Unremarkable. Osseous structures: No aggressive focal lytic or sclerotic osseous lesions.     Impression: Mild extra and extrahepatic biliary ductal dilatation is similar dating back to 2022. No findings of choledocholithiasis or other obstructive process. Electronically Signed: Lazarus Sotelo MD  9/19/2023 11:49 AM EDT  Workstation ID: [id number]    XR Chest PA & Lateral    Result Date: 9/17/2023  XR CHEST PA AND LATERAL Date of Exam: 9/17/2023 11:00 AM EDT Indication: COPD, SOB follow-up Comparison: CT 9/17/2023. FINDINGS: Mild chronic changes of the lung fields are present without focal airspace opacity. There is no significant pleural effusion or distinct pneumothorax. Normal heart and mediastinal contours.     Mild chronic emphysematous changes of the lung fields without evidence of acute cardiopulmonary abnormality. Electronically Signed: Kalyan Funez MD  9/17/2023 4:51 PM EDT  Workstation ID: [id number]             Results for orders placed during the hospital encounter of 01/03/19    Adult Transthoracic Echo Complete W/ Cont if Necessary Per Protocol    Interpretation  Summary  · Left ventricular systolic function is normal.  · Estimated EF appears to be in the range of 61 - 65%.  · Mild aortic valve regurgitation is present.      Plan for Follow-up of Pending Labs/Results:     Discharge Details        Discharge Medications        New Medications        Instructions Start Date   amLODIPine 10 MG tablet  Commonly known as: NORVASC   10 mg, Oral, Every 24 Hours Scheduled   Start Date: September 21, 2023     benzonatate 200 MG capsule  Commonly known as: TESSALON   200 mg, Oral, 3 Times Daily PRN      budesonide 0.5 MG/2ML nebulizer solution  Commonly known as: PULMICORT   0.5 mg, Nebulization, 2 Times Daily - RT      ipratropium-albuterol 0.5-2.5 mg/3 ml nebulizer  Commonly known as: DUO-NEB   3 mL, Nebulization, Every 6 Hours - RT      ipratropium-albuterol 0.5-2.5 mg/3 ml nebulizer  Commonly known as: DUO-NEB   3 mL, Nebulization, Every 4 Hours PRN      Lidocaine 4 %   1 patch, Transdermal, Every 24 Hours Scheduled, Remove & Discard patch within 12 hours or as directed by MD   Start Date: September 21, 2023     pharmacy consult - MTM   Does not apply, Daily      predniSONE 20 MG tablet  Commonly known as: DELTASONE   20 mg, Oral, Daily With Breakfast   Start Date: September 21, 2023            Changes to Medications        Instructions Start Date   OXcarbazepine 600 MG tablet  Commonly known as: TRILEPTAL  What changed:   medication strength  how much to take  when to take this   600 mg, Oral, Nightly      tiZANidine 4 MG tablet  Commonly known as: ZANAFLEX  What changed:   when to take this  reasons to take this   4 mg, Oral, Nightly PRN             Continue These Medications        Instructions Start Date   albuterol sulfate  (90 Base) MCG/ACT inhaler  Commonly known as: PROVENTIL HFA;VENTOLIN HFA;PROAIR HFA   2 puffs, Inhalation, Every 4 Hours PRN      HYDROcodone-acetaminophen  MG per tablet  Commonly known as: NORCO   1 tablet, Oral, Every 6 Hours PRN       mirtazapine 30 MG tablet  Commonly known as: REMERON   30 mg, Oral, Every Night at Bedtime      polyethylene glycol 17 GM/SCOOP powder  Commonly known as: MIRALAX   17 g, Oral, Daily      QUEtiapine 100 MG tablet  Commonly known as: SEROquel   TAKE 1 TABLET BY MOUTH EVERY DAY AT NIGHT      vitamin B-12 500 MCG tablet  Commonly known as: CYANOCOBALAMIN   500 mcg, Oral, Daily, OTC      Zinc 100 MG tablet   1 tablet, Oral, Daily, OTC               Allergies   Allergen Reactions    Penicillins Anaphylaxis    Lisinopril Rash and Cough         Discharge Disposition:  Skilled Nursing Facility (DC - External)    Diet:  Hospital:  Diet Order   Procedures    Diet: Regular/House Diet; Texture: Regular Texture (IDDSI 7); Fluid Consistency: Thin (IDDSI 0)       Activity:             CODE STATUS:    Code Status and Medical Interventions:   Ordered at: 09/13/23 1621     Level Of Support Discussed With:    Patient     Code Status (Patient has no pulse and is not breathing):    CPR (Attempt to Resuscitate)     Medical Interventions (Patient has pulse or is breathing):    Full Support       Future Appointments   Date Time Provider Department Center   9/22/2023  9:15 AM [facility]   11/6/2023  2:45 PM Mehdi Petit MD [facility]   1/5/2024  1:30 PM Mehdi Petit MD [facility]       Additional Instructions for the Follow-ups that You Need to Schedule       Discharge Follow-up with PCP   As directed       Currently Documented PCP:    Rosalba Howard APRN    PCP Phone Number:    [phone]     Follow Up Details: 1 week after discharge from rehab                      Nolan Springer MD  09/20/23      Time Spent on Discharge:  I spent  35  minutes on this discharge activity which included: face-to-face encounter with the patient, reviewing the data in the system, coordination of the care with the nursing staff as well as consultants, documentation, and entering orders.

## 2023-09-20 NOTE — PLAN OF CARE
Problem: Adult Inpatient Plan of Care  Goal: Absence of Hospital-Acquired Illness or Injury  Intervention: Identify and Manage Fall Risk  Recent Flowsheet Documentation  Taken 9/20/2023 0400 by Camryn Moore RN  Safety Promotion/Fall Prevention:   activity supervised   assistive device/personal items within reach   clutter free environment maintained   fall prevention program maintained   nonskid shoes/slippers when out of bed   room organization consistent   safety round/check completed   toileting scheduled  Taken 9/20/2023 0200 by Camryn Moore, RN  Safety Promotion/Fall Prevention:   activity supervised   assistive device/personal items within reach   clutter free environment maintained   fall prevention program maintained   nonskid shoes/slippers when out of bed   room organization consistent   safety round/check completed   toileting scheduled  Taken 9/20/2023 0005 by Camryn Moore RN  Safety Promotion/Fall Prevention:   activity supervised   fall prevention program maintained   nonskid shoes/slippers when out of bed   room organization consistent   safety round/check completed   toileting scheduled  Taken 9/19/2023 2215 by Camryn Moore, RN  Safety Promotion/Fall Prevention:   activity supervised   assistive device/personal items within reach   clutter free environment maintained   fall prevention program maintained   nonskid shoes/slippers when out of bed   room organization consistent   safety round/check completed   toileting scheduled  Taken 9/19/2023 2130 by Camryn Moore RN  Safety Promotion/Fall Prevention:   activity supervised   assistive device/personal items within reach   clutter free environment maintained   fall prevention program maintained   nonskid shoes/slippers when out of bed   room organization consistent   safety round/check completed   toileting scheduled  Taken 9/19/2023 2040 by Camryn Moore, RN  Safety Promotion/Fall Prevention:   activity supervised   assistive  device/personal items within reach   clutter free environment maintained   fall prevention program maintained   nonskid shoes/slippers when out of bed   room organization consistent   safety round/check completed   toileting scheduled     Problem: Adult Inpatient Plan of Care  Goal: Absence of Hospital-Acquired Illness or Injury  Intervention: Prevent Skin Injury  Recent Flowsheet Documentation  Taken 9/20/2023 0400 by Camryn Moore RN  Body Position: position changed independently  Taken 9/20/2023 0200 by Camryn Moore RN  Body Position: position changed independently  Skin Protection:   adhesive use limited   skin-to-device areas padded   skin-to-skin areas padded   transparent dressing maintained   tubing/devices free from skin contact  Taken 9/20/2023 0005 by Camryn Moore RN  Body Position: position changed independently  Skin Protection:   adhesive use limited   skin-to-device areas padded   skin-to-skin areas padded   transparent dressing maintained   tubing/devices free from skin contact  Taken 9/19/2023 2215 by Camryn Moore RN  Body Position: position changed independently  Taken 9/19/2023 2130 by Camryn Moore RN  Body Position: position changed independently  Skin Protection:   adhesive use limited   skin-to-device areas padded   skin-to-skin areas padded   transparent dressing maintained   tubing/devices free from skin contact  Taken 9/19/2023 2040 by Camryn Moore RN  Body Position: position changed independently  Skin Protection:   adhesive use limited   skin-to-device areas padded   skin-to-skin areas padded   transparent dressing maintained   tubing/devices free from skin contact     Problem: Adult Inpatient Plan of Care  Goal: Absence of Hospital-Acquired Illness or Injury  Intervention: Prevent and Manage VTE (Venous Thromboembolism) Risk  Recent Flowsheet Documentation  Taken 9/20/2023 0400 by Camryn Moore RN  Activity Management: activity encouraged  Taken 9/20/2023 0200 by  Moore, April, RN  Activity Management: activity encouraged  Taken 9/20/2023 0005 by Camryn Moore RN  Activity Management: activity encouraged  Taken 9/19/2023 2215 by Camryn Moore RN  Activity Management: activity encouraged  Taken 9/19/2023 2130 by Camryn Moore RN  Activity Management: activity encouraged  VTE Prevention/Management: (see MAR/ refused Heparin) other (see comments)  Range of Motion: active ROM (range of motion) encouraged  Taken 9/19/2023 2040 by Camryn Moore RN  Activity Management: activity encouraged     Problem: Adult Inpatient Plan of Care  Goal: Absence of Hospital-Acquired Illness or Injury  Intervention: Prevent Infection  Recent Flowsheet Documentation  Taken 9/20/2023 0400 by Camryn Moore RN  Infection Prevention:   environmental surveillance performed   rest/sleep promoted   single patient room provided  Taken 9/20/2023 0200 by Camryn Moore RN  Infection Prevention:   environmental surveillance performed   rest/sleep promoted   single patient room provided  Taken 9/20/2023 0005 by Camryn Moore RN  Infection Prevention:   environmental surveillance performed   rest/sleep promoted   single patient room provided  Taken 9/19/2023 2215 by Camryn Moore RN  Infection Prevention:   environmental surveillance performed   rest/sleep promoted   single patient room provided  Taken 9/19/2023 2130 by Camryn Moore RN  Infection Prevention:   environmental surveillance performed   rest/sleep promoted   single patient room provided  Taken 9/19/2023 2040 by Camryn Moore RN  Infection Prevention:   environmental surveillance performed   rest/sleep promoted   single patient room provided     Problem: Adult Inpatient Plan of Care  Goal: Optimal Comfort and Wellbeing  Intervention: Monitor Pain and Promote Comfort  Recent Flowsheet Documentation  Taken 9/19/2023 2215 by Camryn Moore RN  Pain Management Interventions:   quiet environment facilitated   care clustered  Taken  9/19/2023 2130 by Camryn Moore RN  Pain Management Interventions:   see MAR   quiet environment facilitated   care clustered     Problem: Adult Inpatient Plan of Care  Goal: Optimal Comfort and Wellbeing  Intervention: Provide Person-Centered Care  Recent Flowsheet Documentation  Taken 9/20/2023 0005 by Camryn Moore RN  Trust Relationship/Rapport:   care explained   choices provided   emotional support provided   empathic listening provided   questions answered   questions encouraged   reassurance provided   thoughts/feelings acknowledged  Taken 9/19/2023 2215 by Camryn Moore RN  Trust Relationship/Rapport:   thoughts/feelings acknowledged   empathic listening provided  Taken 9/19/2023 2130 by Camryn Moore RN  Trust Relationship/Rapport:   care explained   choices provided   emotional support provided   empathic listening provided   questions answered   questions encouraged   reassurance provided   thoughts/feelings acknowledged  Taken 9/19/2023 2040 by Camryn Moore RN  Trust Relationship/Rapport:   care explained   choices provided   emotional support provided   empathic listening provided   questions answered   questions encouraged   reassurance provided   thoughts/feelings acknowledged     Problem: Fall Injury Risk  Goal: Absence of Fall and Fall-Related Injury  Intervention: Identify and Manage Contributors  Recent Flowsheet Documentation  Taken 9/20/2023 0400 by Camryn Moore RN  Medication Review/Management: medications reviewed  Self-Care Promotion: independence encouraged  Taken 9/20/2023 0200 by Camryn Moore RN  Medication Review/Management: medications reviewed  Self-Care Promotion: independence encouraged  Taken 9/20/2023 0005 by Camryn Moore RN  Medication Review/Management: medications reviewed  Self-Care Promotion: independence encouraged  Taken 9/19/2023 2215 by Camryn Moore RN  Medication Review/Management: medications reviewed  Self-Care Promotion: independence  encouraged  Taken 9/19/2023 2130 by Camryn Moore RN  Medication Review/Management: medications reviewed  Self-Care Promotion: independence encouraged  Taken 9/19/2023 2040 by Camryn Moore RN  Medication Review/Management: medications reviewed  Self-Care Promotion: independence encouraged     Problem: Electrolyte Imbalance  Goal: Electrolyte Balance  Outcome: Ongoing, Progressing  Intervention: Monitor and Manage Electrolyte Imbalance  Recent Flowsheet Documentation  Taken 9/20/2023 0400 by Camryn Moore RN  Fluid/Electrolyte Management: fluids provided  Taken 9/20/2023 0200 by Camryn Moore RN  Fluid/Electrolyte Management: fluids provided  Taken 9/20/2023 0005 by Camryn Moore RN  Fluid/Electrolyte Management: fluids provided  Taken 9/19/2023 2130 by Camryn Moore RN  Fluid/Electrolyte Management: fluids provided  Taken 9/19/2023 2040 by Camryn Moore RN  Fluid/Electrolyte Management: fluids provided   Goal Outcome Evaluation:  Plan of Care Reviewed With: patient        Progress: improving  Outcome Evaluation: Pt AOx4, VSS, NSR, 95% on RA. Pt. is looking forward to leaving the hospital. Pt. refused Heparin, educated about Heparin use and pt. confirmed understanding. Fall precautions maintained. Continue monitoring.

## 2023-09-20 NOTE — CASE MANAGEMENT/SOCIAL WORK
Case Management Discharge Note      Final Note: Per Liset(liason for [facility]) the doctor has agreed to  accept pt and [facility] can take pt today on CVA2(acute) unit.  Pt wants her son to transport her around 1400.  Discharge summary faxed to [phone]. Pt/s nurse can call report to [phone] and send a copy of the discharge summary with pt. Per primary RN, pt will not need O2 for transport. Pt's sao2 92% on room air.  I met with pt at bedside and she is agreeable to discharge plan.         Selected Continued Care - Admitted Since 9/13/2023       Destination Coordination complete.      Service Provider Selected Services Address Phone Fax Patient Preferred    [facility] [address] [address] [phone] --              Durable Medical Equipment    No services have been selected for the patient.                Dialysis/Infusion    No services have been selected for the patient.                Home Medical Care    No services have been selected for the patient.                Therapy    No services have been selected for the patient.                Community Resources    No services have been selected for the patient.                Community & DME    No services have been selected for the patient.                         Final Discharge Disposition Code: 62 - inpatient rehab facility

## 2023-09-20 NOTE — PLAN OF CARE
Goal Outcome Evaluation:  Plan of Care Reviewed With: patient        Progress: improving  Outcome Evaluation: Pt. with improved activity tolerance from yesterday with ability to do some activity OOB, but still with overall poor activity tolerance.  Pt. needed many sitting and standing rest periods throughout session.  Limited PLB ability to due to makes pt. cough.  Pt. remains appropriate for skilled IP OT services due to pt. much below baseline independence level ADLs.      Anticipated Discharge Disposition (OT): inpatient rehabilitation facility

## 2023-11-06 ENCOUNTER — OFFICE VISIT (OUTPATIENT)
Dept: NEUROLOGY | Facility: CLINIC | Age: 73
End: 2023-11-06
Payer: MEDICARE

## 2023-11-06 VITALS
OXYGEN SATURATION: 95 % | WEIGHT: 96.6 LBS | BODY MASS INDEX: 17.78 KG/M2 | HEART RATE: 77 BPM | HEIGHT: 62 IN | DIASTOLIC BLOOD PRESSURE: 76 MMHG | SYSTOLIC BLOOD PRESSURE: 128 MMHG

## 2023-11-06 DIAGNOSIS — M79.2 NEUROPATHIC PAIN: Chronic | ICD-10-CM

## 2023-11-06 DIAGNOSIS — G35 MS (MULTIPLE SCLEROSIS): Primary | Chronic | ICD-10-CM

## 2023-11-06 DIAGNOSIS — F32.9 REACTIVE DEPRESSION: Chronic | ICD-10-CM

## 2023-11-06 PROCEDURE — 3074F SYST BP LT 130 MM HG: CPT | Performed by: PSYCHIATRY & NEUROLOGY

## 2023-11-06 PROCEDURE — 1160F RVW MEDS BY RX/DR IN RCRD: CPT | Performed by: PSYCHIATRY & NEUROLOGY

## 2023-11-06 PROCEDURE — 3078F DIAST BP <80 MM HG: CPT | Performed by: PSYCHIATRY & NEUROLOGY

## 2023-11-06 PROCEDURE — 1159F MED LIST DOCD IN RCRD: CPT | Performed by: PSYCHIATRY & NEUROLOGY

## 2023-11-06 PROCEDURE — 99214 OFFICE O/P EST MOD 30 MIN: CPT | Performed by: PSYCHIATRY & NEUROLOGY

## 2023-11-06 RX ORDER — MIRTAZAPINE 30 MG/1
30 TABLET, FILM COATED ORAL
Qty: 90 TABLET | Refills: 3 | Status: SHIPPED | OUTPATIENT
Start: 2023-11-06

## 2023-11-06 RX ORDER — OXCARBAZEPINE 600 MG/1
600 TABLET, FILM COATED ORAL NIGHTLY
Start: 2023-11-06

## 2023-11-06 RX ORDER — TIZANIDINE 4 MG/1
4 TABLET ORAL NIGHTLY PRN
Start: 2023-11-06

## 2023-11-06 NOTE — PROGRESS NOTES
"Chief Complaint  Multiple Sclerosis    Subjective        Angela Diaz Parent presents to [facility] NEUROLOGY    History of Present Illness    72 y.o. female returns in follow up.  Last visit on 1/5/23 continued Zanaflex, OXC, Remeron.       Admitted [facility] 9/13/23 - 9/20/23 COPD exac, malnutrition.      Stable on Seroquel     Wt stable at 96      OXC controlling flexor spasms improved.       Hep C positive but inactive.      Problem history:     Dx in 1997 previously followed by Dr Jensen and Taty Montano.   Treated with Rebif two different times for 5 years and off for a year then back on for 7 - 9 years.  Stopped Rebif two months ago.       Last MRI Brain 18 months ago.  Anxiety for small spaces.       Fatigue is severe.       Pain in heads with sharp shooting episodes.  Sits on head and puts them in hot water.  Movement worsens pain.  Sharp shooting pains down shins once a week.       Gait unsteady.       Attention and concentration decreasing.       Reviewed medical records:     Followed by pain management treated with narcotics.  Complains of pain in hands.  Taking Xanax for anxiety referred to Psychiatry.       Hep C ab reactive; quantitation not detected.    Objective   Vital Signs:  /76   Pulse 77   Ht 157.5 cm (62.01\")   Wt 43.8 kg (96 lb 9.6 oz) Comment: patient states this should be 102 lb  SpO2 95%   BMI 17.66 kg/m²   Estimated body mass index is 17.66 kg/m² as calculated from the following:    Height as of this encounter: 157.5 cm (62.01\").    Weight as of this encounter: 43.8 kg (96 lb 9.6 oz).           Neurologic Exam     Mental Status   Oriented to person, place, and time.   Speech: speech is normal   Level of consciousness: alert  Knowledge: good and consistent with education.   Normal comprehension.     Cranial Nerves   Cranial nerves II through XII intact.     CN II   Visual fields full to confrontation.   Visual acuity: normal  Right visual field deficit: none  Left " visual field deficit: none     CN III, IV, VI   Pupils are equal, round, and reactive to light.  Extraocular motions are normal.   Nystagmus: none   Diplopia: none  Ophthalmoparesis: none  Upgaze: normal  Downgaze: normal  Conjugate gaze: present    CN V   Facial sensation intact.   Right corneal reflex: normal  Left corneal reflex: normal    CN VII   Right facial weakness: none  Left facial weakness: none    CN VIII   Hearing: intact    CN IX, X   Palate: symmetric  Right gag reflex: normal  Left gag reflex: normal    CN XI   Right sternocleidomastoid strength: normal  Left sternocleidomastoid strength: normal    CN XII   Tongue: not atrophic  Fasciculations: absent  Tongue deviation: none    Motor Exam   Muscle bulk: normal  Overall muscle tone: normal    Strength   Strength 5/5 throughout.     Sensory Exam   Light touch normal.     Gait, Coordination, and Reflexes     Gait  Gait: normal    Tremor   Resting tremor: absent  Intention tremor: absent  Action tremor: absent    Reflexes   Reflexes 2+ except as noted.        Physical Exam  Eyes:      Extraocular Movements: EOM normal.      Pupils: Pupils are equal, round, and reactive to light.   Neurological:      Mental Status: She is oriented to person, place, and time.      Cranial Nerves: Cranial nerves 2-12 are intact.      Motor: Motor strength is normal.     Gait: Gait is intact.   Psychiatric:         Speech: Speech normal.        Result Review :  The following data was reviewed by: Mehdi Petit MD on 11/06/2023:  Common labs          9/18/2023    04:05 9/19/2023    04:30 9/20/2023    04:39   Common Labs   Glucose 134  118  94    BUN 30  32  30    Creatinine 0.70  0.62  0.55    Sodium 130  129  130    Potassium 5.3  5.4  4.8    Chloride 97  94  97    Calcium 9.2  9.0  9.1    Albumin 3.4  3.4     Total Bilirubin 0.2  0.3     Alkaline Phosphatase 106  100     AST (SGOT) 43  36     ALT (SGPT) 76  89     WBC 16.44  14.35  14.32    Hemoglobin 11.3  11.2  10.7     Hematocrit 33.3  32.7  31.0    Platelets 524  [phone]                   Assessment and Plan   Diagnoses and all orders for this visit:    1. MS (multiple sclerosis) (Primary)  Assessment & Plan:  Stable off DMT      2. Neuropathic pain  Assessment & Plan:  Continue Zanaflex OXC       3. Reactive depression    Other orders  -     mirtazapine (REMERON) 30 MG tablet; Take 1 tablet by mouth every night at bedtime.  Dispense: 90 tablet; Refill: 3  -     OXcarbazepine (TRILEPTAL) 600 MG tablet; Take 1 tablet by mouth Every Night.  -     tiZANidine (ZANAFLEX) 4 MG tablet; Take 1 tablet by mouth At Night As Needed for Muscle Spasms.             Follow Up   No follow-ups on file.  Patient was given instructions and counseling regarding her condition or for health maintenance advice. Please see specific information pulled into the AVS if appropriate.

## 2023-11-08 ENCOUNTER — OFFICE VISIT (OUTPATIENT)
Dept: FAMILY MEDICINE CLINIC | Facility: CLINIC | Age: 73
End: 2023-11-08
Payer: MEDICARE

## 2023-11-08 VITALS
WEIGHT: 98.4 LBS | BODY MASS INDEX: 18.11 KG/M2 | HEART RATE: 83 BPM | DIASTOLIC BLOOD PRESSURE: 90 MMHG | OXYGEN SATURATION: 96 % | SYSTOLIC BLOOD PRESSURE: 184 MMHG | HEIGHT: 62 IN | TEMPERATURE: 97.5 F

## 2023-11-08 DIAGNOSIS — R19.7 DIARRHEA, UNSPECIFIED TYPE: Primary | ICD-10-CM

## 2023-11-08 DIAGNOSIS — R10.11 RIGHT UPPER QUADRANT PAIN: ICD-10-CM

## 2023-11-08 DIAGNOSIS — R76.8 HEPATITIS C ANTIBODY TEST POSITIVE: ICD-10-CM

## 2023-11-08 DIAGNOSIS — I10 ESSENTIAL HYPERTENSION: Chronic | ICD-10-CM

## 2023-11-08 DIAGNOSIS — F41.1 GAD (GENERALIZED ANXIETY DISORDER): ICD-10-CM

## 2023-11-08 DIAGNOSIS — J44.9 CHRONIC OBSTRUCTIVE PULMONARY DISEASE, UNSPECIFIED COPD TYPE: ICD-10-CM

## 2023-11-08 DIAGNOSIS — Z76.0 MEDICATION REFILL: ICD-10-CM

## 2023-11-08 RX ORDER — BUDESONIDE 0.5 MG/2ML
0.5 INHALANT ORAL
Qty: 240 ML | Refills: 0
Start: 2023-11-08

## 2023-11-08 RX ORDER — BUSPIRONE HYDROCHLORIDE 5 MG/1
5 TABLET ORAL 2 TIMES DAILY
Qty: 60 TABLET | Refills: 1 | Status: SHIPPED | OUTPATIENT
Start: 2023-11-08

## 2023-11-08 RX ORDER — IPRATROPIUM BROMIDE AND ALBUTEROL SULFATE 2.5; .5 MG/3ML; MG/3ML
3 SOLUTION RESPIRATORY (INHALATION)
Qty: 360 ML | Refills: 5 | Status: SHIPPED | OUTPATIENT
Start: 2023-11-08

## 2023-11-08 RX ORDER — SACCHAROMYCES BOULARDII 250 MG
250 CAPSULE ORAL 2 TIMES DAILY
Qty: 60 CAPSULE | Refills: 0 | Status: SHIPPED | OUTPATIENT
Start: 2023-11-08

## 2023-11-08 NOTE — PROGRESS NOTES
Follow Up Office Note     Patient Name: Angela Warren  : 1950   MRN: [id number]     Chief Complaint:    Chief Complaint   Patient presents with    Diarrhea     Per patient the diarrhea started about 6 days ago and it just comes and goes.     Abdominal Pain     Per patient she abdominal pain comes right before or right after she has diarrhea.     Anxiety       History of Present Illness: Angela Warren is a 72 y.o. female who presents today with c/o diarrhea and abdominal cramping x approximately 6 days. Patient states that she just got out of [facility]. Prior to being at [facility] patient was admitted to [facility] for acute on chronic respiratory failure. Patient was on abx while hospitalized. She states that she is having 2-3 loose stools per day.  She denies melena or hematochezia. Patient states that she has been taking Pepto-Bismol with some improvement in her symptoms.    Patient recently underwent an MRCP which indicated mild extrahepatic biliary ductal dilation.  There was no evidence of obstructive process or choledocholithiasis. Patient did have elevated LFT's on recent labs.    Patient reports that since she has been ill her chronic anxiety has worsened significantly.  She would like to try medication to help with her symptoms.    ED to Hosp-Admission (Discharged) with Nolan Springer MD; Andry Cox,  (2023)   MRI abdomen wo contrast mrcp (2023 11:18)   US Gallbladder (2023 09:46)   Comprehensive Metabolic Panel (2023 04:30)     Subjective      I have reviewed and the following portions of the patient's history were updated as appropriate: past family history, past medical history, past social history, past surgical history and problem list.    Review of Systems:   Review of Systems   Constitutional:  Positive for appetite change and fatigue. Negative for chills, diaphoresis and fever.   HENT:  Negative for sinus pain.     Respiratory:  Negative for cough, wheezing and stridor.         Patient reports her respiratory status is at her baseline.   Cardiovascular:  Negative for chest pain and palpitations.   Gastrointestinal:  Positive for abdominal pain (intermittent cramping) and diarrhea (intermittent, gradually improving). Negative for abdominal distention, anal bleeding, blood in stool, constipation, nausea, rectal pain and vomiting.        Denies heartburn or reflux.        Past Medical History:   Past Medical History:   Diagnosis Date    Acid reflux     Anxiety     Anxiety     Arthritis     Cataract     CKD (chronic kidney disease) stage 2, GFR 60-89 ml/min 2018    Colon polyp     Depression     GERD (gastroesophageal reflux disease)     Headache     Heart murmur     History of blood transfusion 1971    after surgery     Hypertension 2018    Infectious viral hepatitis     Mild aortic regurgitation 01/03/2019    MS (multiple sclerosis)     Pneumonia     Prediabetes 12/12/2019         Medications:     Current Outpatient Medications:     albuterol sulfate  (90 Base) MCG/ACT inhaler, Inhale 2 puffs Every 4 (Four) Hours As Needed for Wheezing., Disp: 18 g, Rfl: 6    amLODIPine (NORVASC) 10 MG tablet, 1 tablet., Disp: , Rfl:     budesonide (PULMICORT) 0.5 MG/2ML nebulizer solution, Take 2 mL by nebulization 2 (Two) Times a Day., Disp: 240 mL, Rfl: 0    HYDROcodone-acetaminophen (NORCO)  MG per tablet, Take 1 tablet by mouth Every 6 (Six) Hours As Needed., Disp: , Rfl:     ipratropium-albuterol (DUO-NEB) 0.5-2.5 mg/3 ml nebulizer, Take 3 mL by nebulization Every 6 (Six) Hours., Disp: 360 mL, Rfl: 5    Lidocaine 4 %, Place 1 patch on the skin as directed by provider Daily. Remove & Discard patch within 12 hours or as directed by MD, Disp: , Rfl:     metoprolol succinate XL (TOPROL-XL) 25 MG 24 hr tablet, 1 tablet., Disp: , Rfl:     mirtazapine (REMERON) 30 MG tablet, Take 1 tablet by mouth every night at bedtime., Disp: 90  "tablet, Rfl: 3    OXcarbazepine (TRILEPTAL) 600 MG tablet, Take 1 tablet by mouth Every Night., Disp: , Rfl:     pantoprazole (PROTONIX) 40 MG EC tablet, 1 tablet., Disp: , Rfl:     QUEtiapine (SEROquel) 100 MG tablet, TAKE 1 TABLET BY MOUTH EVERY DAY AT NIGHT, Disp: 90 tablet, Rfl: 3    tiZANidine (ZANAFLEX) 4 MG tablet, Take 1 tablet by mouth At Night As Needed for Muscle Spasms., Disp: , Rfl:     vitamin B-12 (CYANOCOBALAMIN) 500 MCG tablet, Take 1 tablet by mouth Daily. OTC, Disp: , Rfl:     Zinc 100 MG tablet, Take 1 tablet by mouth Daily. OTC, Disp: , Rfl:     busPIRone (BUSPAR) 5 MG tablet, Take 1 tablet by mouth 2 (Two) Times a Day., Disp: 60 tablet, Rfl: 1    OXcarbazepine (TRILEPTAL) 300 MG tablet, TAKE 1 TABLET BY MOUTH TWICE A DAY, Disp: 180 tablet, Rfl: 1    saccharomyces boulardii (Florastor) 250 MG capsule, Take 1 capsule by mouth 2 (Two) Times a Day., Disp: 60 capsule, Rfl: 0    Allergies:   Allergies   Allergen Reactions    Penicillins Anaphylaxis    Lisinopril Rash and Cough         Objective     Physical Exam:  Vital Signs:   Vitals:    11/08/23 1306   BP: (!) 184/90   Pulse: 83   Temp: 97.5 °F (36.4 °C)   TempSrc: Infrared   SpO2: 96%   Weight: 44.6 kg (98 lb 6.4 oz)   Height: 157.5 cm (62.01\")   PainSc: 0-No pain     Body mass index is 17.99 kg/m².     Physical Exam  Vitals and nursing note reviewed.   Constitutional:       General: She is not in acute distress.     Appearance: Normal appearance. She is well-developed. She is not ill-appearing, toxic-appearing or diaphoretic.   HENT:      Head: Normocephalic and atraumatic.   Cardiovascular:      Rate and Rhythm: Normal rate and regular rhythm.      Heart sounds: S1 normal and S2 normal. Murmur heard.   Pulmonary:      Effort: Pulmonary effort is normal. No respiratory distress.      Breath sounds: Normal breath sounds. No stridor. No wheezing.   Abdominal:      General: Bowel sounds are normal. There is no distension.      Palpations: Abdomen " is soft.      Tenderness: There is abdominal tenderness (mild) in the right upper quadrant.   Musculoskeletal:      Right lower leg: No edema.      Left lower leg: No edema.   Skin:     General: Skin is warm and dry.   Neurological:      General: No focal deficit present.      Mental Status: She is alert and oriented to person, place, and time.   Psychiatric:         Mood and Affect: Mood normal.         Behavior: Behavior normal. Behavior is cooperative.         Thought Content: Thought content normal.         Judgment: Judgment normal.         Assessment / Plan      Assessment/Plan:   Diagnoses and all orders for this visit:    1. Diarrhea, unspecified type (Primary)  Assessment & Plan:  New problem which is improving with OTC treatment. May continue Pepto-Bismol prn. Recommend OTC Florastor.  Maintain adequate hydration.      2. Right upper quadrant pain  Assessment & Plan:  Plan to continue pantoprazole and refer to gastro for further evaluation and management.    Orders:  -     Ambulatory Referral to Gastroenterology    3. MINDI (generalized anxiety disorder)  Assessment & Plan:  Psychological condition is worsening.  Plan to begin BuSpar per orders.  Psychological condition  will be reassessed in 2-4 weeks.    Orders:  -     busPIRone (BUSPAR) 5 MG tablet; Take 1 tablet by mouth 2 (Two) Times a Day.  Dispense: 60 tablet; Refill: 1    4. Essential hypertension  Assessment & Plan:  Hypertension is worsening. Patient reports that she was prescribed two blood pressure medications while she was in [facility] but does not remember the names. She states that she did not start taking these medications as she wasn't sure that she needed them.   After review of medical records it was determined that the blood pressure medications were amlodipine and metoprolol.  I advised patient to begin these medications.  Patient verbalized understanding and agreement with plan of care.    Patient attributes much of her elevated  blood pressure today due to her anxiety.  Recommend medication for anxiety management and ambulatory blood pressure monitoring.  Follow-up in 2-4 weeks.    Orders:  -     Blood Pressure Device    5. Hepatitis C antibody test positive  -     Ambulatory Referral to Gastroenterology    6. Chronic obstructive pulmonary disease, unspecified COPD type  -     ipratropium-albuterol (DUO-NEB) 0.5-2.5 mg/3 ml nebulizer; Take 3 mL by nebulization Every 6 (Six) Hours.  Dispense: 360 mL; Refill: 5  -     saccharomyces boulardii (Florastor) 250 MG capsule; Take 1 capsule by mouth 2 (Two) Times a Day.  Dispense: 60 capsule; Refill: 0  -     budesonide (PULMICORT) 0.5 MG/2ML nebulizer solution; Take 2 mL by nebulization 2 (Two) Times a Day.  Dispense: 240 mL; Refill: 0    7. Medication refill  -     busPIRone (BUSPAR) 5 MG tablet; Take 1 tablet by mouth 2 (Two) Times a Day.  Dispense: 60 tablet; Refill: 1  -     budesonide (PULMICORT) 0.5 MG/2ML nebulizer solution; Take 2 mL by nebulization 2 (Two) Times a Day.  Dispense: 240 mL; Refill: 0           Follow Up:   PRN and at next scheduled appointment(s) with PCP.    Discussed the nature of the medical condition(s) risks, complications, implications, management, safe and proper use of medications. Encouraged medication compliance, and keeping scheduled follow up appointments with me and any other providers.      RTC if symptoms fail to improve, to ER if symptoms worsen.        *Dictated Utilizing Dragon Dictation   Please note that portions of this note were completed with a voice recognition program.   Part of this note may be an electronic transcription/translation of spoken language to printed text using the Dragon Dictation System. Spelling and/or grammatical errors may exist despite efforts at proofreading.      NOTE TO PATIENT: The 21st Century Cures Act makes medical notes like these available to patients in the interest of transparency. However, be advised this is a medical  document. It is intended as peer to peer communication. It is written in medical language and may contain abbreviations or verbiage that are unfamiliar. It may appear blunt or direct. Medical documents are intended to carry relevant information, facts as evident, and the clinical opinion of the practitioner.      WILLY Reaves  [facility]

## 2023-11-10 DIAGNOSIS — G35 MULTIPLE SCLEROSIS: ICD-10-CM

## 2023-11-10 RX ORDER — OXCARBAZEPINE 300 MG/1
TABLET, FILM COATED ORAL
Qty: 180 TABLET | Refills: 1 | Status: SHIPPED | OUTPATIENT
Start: 2023-11-10

## 2023-11-11 PROBLEM — R19.7 DIARRHEA: Status: ACTIVE | Noted: 2023-11-11

## 2023-11-11 PROBLEM — F41.1 GAD (GENERALIZED ANXIETY DISORDER): Chronic | Status: ACTIVE | Noted: 2019-11-09

## 2023-11-11 PROBLEM — R76.8 HEPATITIS C ANTIBODY TEST POSITIVE: Chronic | Status: ACTIVE | Noted: 2019-11-25

## 2023-11-11 RX ORDER — PANTOPRAZOLE SODIUM 40 MG/1
40 TABLET, DELAYED RELEASE ORAL
COMMUNITY
Start: 2023-09-29

## 2023-11-11 RX ORDER — METOPROLOL SUCCINATE 25 MG/1
25 TABLET, EXTENDED RELEASE ORAL
COMMUNITY
Start: 2023-09-28

## 2023-11-11 RX ORDER — AMLODIPINE BESYLATE 10 MG/1
10 TABLET ORAL
COMMUNITY
Start: 2023-09-20

## 2023-11-11 NOTE — ASSESSMENT & PLAN NOTE
New problem which is improving with OTC treatment. May continue Pepto-Bismol prn. Recommend OTC Florastor.  Maintain adequate hydration.

## 2023-11-11 NOTE — ASSESSMENT & PLAN NOTE
Hypertension is worsening. Patient reports that she was prescribed two blood pressure medications while she was in [facility] but does not remember the names. She states that she did not start taking these medications as she wasn't sure that she needed them.   After review of medical records it was determined that the blood pressure medications were amlodipine and metoprolol.  I advised patient to begin these medications.  Patient verbalized understanding and agreement with plan of care.    Patient attributes much of her elevated blood pressure today due to her anxiety.  Recommend medication for anxiety management and ambulatory blood pressure monitoring.  Follow-up in 2-4 weeks.

## 2023-11-11 NOTE — ASSESSMENT & PLAN NOTE
Psychological condition is worsening.  Plan to begin BuSpar per orders.  Psychological condition  will be reassessed in 2-4 weeks.

## 2023-12-01 DIAGNOSIS — Z76.0 MEDICATION REFILL: ICD-10-CM

## 2023-12-01 DIAGNOSIS — F41.1 GAD (GENERALIZED ANXIETY DISORDER): ICD-10-CM

## 2023-12-01 RX ORDER — BUSPIRONE HYDROCHLORIDE 5 MG/1
5 TABLET ORAL 2 TIMES DAILY
Qty: 60 TABLET | Refills: 1 | Status: SHIPPED | OUTPATIENT
Start: 2023-12-01

## 2024-01-22 RX ORDER — TIZANIDINE 4 MG/1
4 TABLET ORAL 2 TIMES DAILY
Qty: 180 TABLET | Refills: 3 | Status: SHIPPED | OUTPATIENT
Start: 2024-01-22

## 2024-01-22 NOTE — TELEPHONE ENCOUNTER
Rx Refill Note  Requested Prescriptions     Pending Prescriptions Disp Refills    tiZANidine (ZANAFLEX) 4 MG tablet [Pharmacy Med Name: TIZANIDINE HCL 4 MG TABLET] 180 tablet 3     Sig: TAKE 1 TABLET BY MOUTH TWICE A DAY      Last filled:  11/06/2023 w/3  Last office visit with prescribing clinician: 11/6/2023      Next office visit with prescribing clinician: 5/6/2024     Sneha Warren MA  01/22/24, 12:49 EST

## 2024-03-18 RX ORDER — TIZANIDINE 4 MG/1
4 TABLET ORAL 2 TIMES DAILY
Qty: 180 TABLET | Refills: 3 | OUTPATIENT
Start: 2024-03-18

## 2024-03-18 NOTE — TELEPHONE ENCOUNTER
Pharmacy should have refills    Rx Refill Note  Requested Prescriptions     Pending Prescriptions Disp Refills    tiZANidine (ZANAFLEX) 4 MG tablet [Pharmacy Med Name: TIZANIDINE HCL 4 MG TABLET] 180 tablet 3     Sig: TAKE 1 TABLET BY MOUTH TWICE A DAY      Last filled:  01/22/2024 w/3  Last office visit with prescribing clinician: 11/6/2023      Next office visit with prescribing clinician: 5/6/2024     Sneha Warren MA  03/18/24, 15:53 EDT

## 2024-08-20 RX ORDER — QUETIAPINE FUMARATE 100 MG/1
100 TABLET, FILM COATED ORAL
Qty: 90 TABLET | Refills: 3 | Status: SHIPPED | OUTPATIENT
Start: 2024-08-20

## 2024-08-20 NOTE — TELEPHONE ENCOUNTER
MEDICATION REFILL REQUEST    Caller: Parent, Angela Diaz    Relationship: Self    Best call back number: [phone]    Requested Prescriptions:   Requested Prescriptions     Pending Prescriptions Disp Refills    QUEtiapine (SEROquel) 100 MG tablet 90 tablet 3     Sig: Take 1 tablet by mouth every night at bedtime.      Pharmacy where request should be sent: [id number] - [place] [id number] - [phone]  - [phone]     Last office visit with prescribing clinician: 11/6/2023   Last telemedicine visit with prescribing clinician: Visit date not found   Next office visit with prescribing clinician: 11/5/2024     Additional details provided by patient: PT REPORTS SHE HAS BEEN OUT OF THE SEROQUEL MEDICATION FOR THE PAST 2 DAYS.    Does the patient have less than a 3 day supply?:  [x] Yes  [] No    Would you like a call back once the refill request has been completed?: [] Yes  [x] No    If the office needs to give you a call back, can they leave a voicemail?: [] Yes  [x] No    PLEASE REVIEW AND ADVISE.    Niko Laura Rep   08/20/24 14:07 EDT

## 2024-08-20 NOTE — TELEPHONE ENCOUNTER
Rx Refill Note  Requested Prescriptions     Pending Prescriptions Disp Refills    QUEtiapine (SEROquel) 100 MG tablet 90 tablet 3     Sig: Take 1 tablet by mouth every night at bedtime.      Last filled:  08/03/2023 w/3   Last office visit with prescribing clinician: 11/6/2023      Next office visit with prescribing clinician: 11/5/2024     Sneha Warren MA  08/20/24, 14:19 EDT

## 2024-11-05 ENCOUNTER — OFFICE VISIT (OUTPATIENT)
Dept: NEUROLOGY | Facility: CLINIC | Age: 74
End: 2024-11-05
Payer: MEDICARE

## 2024-11-05 VITALS
WEIGHT: 98.33 LBS | HEART RATE: 107 BPM | SYSTOLIC BLOOD PRESSURE: 142 MMHG | HEIGHT: 62 IN | BODY MASS INDEX: 18.09 KG/M2 | DIASTOLIC BLOOD PRESSURE: 78 MMHG | OXYGEN SATURATION: 92 %

## 2024-11-05 DIAGNOSIS — G35 MS (MULTIPLE SCLEROSIS): Primary | Chronic | ICD-10-CM

## 2024-11-05 DIAGNOSIS — G35 MULTIPLE SCLEROSIS: ICD-10-CM

## 2024-11-05 DIAGNOSIS — M79.2 NEUROPATHIC PAIN: Chronic | ICD-10-CM

## 2024-11-05 PROCEDURE — 3077F SYST BP >= 140 MM HG: CPT | Performed by: PSYCHIATRY & NEUROLOGY

## 2024-11-05 PROCEDURE — 3078F DIAST BP <80 MM HG: CPT | Performed by: PSYCHIATRY & NEUROLOGY

## 2024-11-05 PROCEDURE — 1160F RVW MEDS BY RX/DR IN RCRD: CPT | Performed by: PSYCHIATRY & NEUROLOGY

## 2024-11-05 PROCEDURE — 1159F MED LIST DOCD IN RCRD: CPT | Performed by: PSYCHIATRY & NEUROLOGY

## 2024-11-05 PROCEDURE — 99214 OFFICE O/P EST MOD 30 MIN: CPT | Performed by: PSYCHIATRY & NEUROLOGY

## 2024-11-05 RX ORDER — LOSARTAN POTASSIUM 25 MG/1
1 TABLET ORAL DAILY
COMMUNITY
Start: 2024-07-31

## 2024-11-05 RX ORDER — OXCARBAZEPINE 300 MG/1
300 TABLET, FILM COATED ORAL 2 TIMES DAILY
Qty: 180 TABLET | Refills: 3 | Status: SHIPPED | OUTPATIENT
Start: 2024-11-05

## 2024-11-05 RX ORDER — MIRTAZAPINE 30 MG/1
30 TABLET, FILM COATED ORAL
Qty: 90 TABLET | Refills: 3 | Status: SHIPPED | OUTPATIENT
Start: 2024-11-05

## 2024-11-05 RX ORDER — QUETIAPINE FUMARATE 100 MG/1
100 TABLET, FILM COATED ORAL
Qty: 90 TABLET | Refills: 3 | Status: SHIPPED | OUTPATIENT
Start: 2024-11-05

## 2024-11-05 NOTE — PROGRESS NOTES
"Chief Complaint    Multiple Sclerosis    Subjective        Angela Diaz Parent presents to [facility]  History of Present Illness    73 y.o. female returns in follow up.  Last visit on 11/6/23 continued Zanaflex, OXC, Remeron.       No new or worsening sx.      Stable on Seroquel     Wt stable at 96      OXC increase is controlled spasms in extremities.       Hep C positive but inactive.      Problem history:     Dx in 1997 previously followed by Dr Jensen and Taty Montano.   Treated with Rebif two different times for 5 years and off for a year then back on for 7 - 9 years.  Stopped Rebif two months ago.       Last MRI Brain 18 months ago.  Anxiety for small spaces.       Fatigue is severe.       Pain in heads with sharp shooting episodes.  Sits on head and puts them in hot water.  Movement worsens pain.  Sharp shooting pains down shins once a week.       Gait unsteady.       Attention and concentration decreasing.       Reviewed medical records:     Followed by pain management treated with narcotics.  Complains of pain in hands.  Taking Xanax for anxiety referred to Psychiatry.       Hep C ab reactive; quantitation not detected.       Objective   Vital Signs:  /78   Pulse 107   Ht 157.5 cm (62.01\")   Wt 44.6 kg (98 lb 5.2 oz)   SpO2 92%   BMI 17.98 kg/m²   Estimated body mass index is 17.98 kg/m² as calculated from the following:    Height as of this encounter: 157.5 cm (62.01\").    Weight as of this encounter: 44.6 kg (98 lb 5.2 oz).        Neurological Exam  Mental Status  Awake, alert and oriented to person, place and time. Oriented to person, place and time. Speech is normal. Language is fluent with no aphasia. Attention and concentration are normal. Fund of knowledge is appropriate for level of education.    Cranial Nerves  CN III, IV, VI: Extraocular movements intact bilaterally. Pupils equal round and reactive to light bilaterally.  CN V: Facial sensation is normal.  CN " VII: Full and symmetric facial movement.  CN IX, X: Palate elevates symmetrically  CN XI: Shoulder shrug strength is normal.  CN XII: Tongue midline without atrophy or fasciculations.    Motor   Strength is 5/5 throughout all four extremities.    Sensory  Sensation is intact to light touch, pinprick, vibration and proprioception in all four extremities.    Reflexes  Deep tendon reflexes are 2+ and symmetric in all four extremities.    Coordination    Finger-to-nose, rapid alternating movements and heel-to-shin normal bilaterally without dysmetria.    Gait  Normal casual, toe, heel and tandem gait.       Physical Exam  Eyes:      Extraocular Movements: Extraocular movements intact.      Pupils: Pupils are equal, round, and reactive to light.   Neurological:      Motor: Motor strength is normal.     Coordination: Coordination is intact.      Deep Tendon Reflexes: Reflexes are normal and symmetric.   Psychiatric:         Speech: Speech normal.        Result Review :  The following data was reviewed by: Mehdi Petit MD on 11/05/2024:              Assessment and Plan   Diagnoses and all orders for this visit:    1. MS (multiple sclerosis) (Primary)    2. Neuropathic pain  Assessment & Plan:  Improved on OXC, Zanaflex.      3. Multiple sclerosis  -     OXcarbazepine (TRILEPTAL) 300 MG tablet; Take 1 tablet by mouth 2 (Two) Times a Day.  Dispense: 180 tablet; Refill: 3    Other orders  -     tiZANidine (ZANAFLEX) 4 MG tablet; Take 1 tablet by mouth 2 (Two) Times a Day.  Dispense: 180 tablet; Refill: 3  -     QUEtiapine (SEROquel) 100 MG tablet; Take 1 tablet by mouth every night at bedtime.  Dispense: 90 tablet; Refill: 3  -     mirtazapine (REMERON) 30 MG tablet; Take 1 tablet by mouth every night at bedtime.  Dispense: 90 tablet; Refill: 3             Follow Up   No follow-ups on file.  Patient was given instructions and counseling regarding her condition or for health maintenance advice. Please see specific  information pulled into the AVS if appropriate.

## 2025-07-02 ENCOUNTER — TRANSCRIBE ORDERS (OUTPATIENT)
Dept: ADMINISTRATIVE | Facility: HOSPITAL | Age: 75
End: 2025-07-02
Payer: MEDICARE

## 2025-07-02 DIAGNOSIS — Z13.820 OSTEOPOROSIS SCREENING: Primary | ICD-10-CM

## 2025-08-12 ENCOUNTER — TRANSCRIBE ORDERS (OUTPATIENT)
Dept: ADMINISTRATIVE | Facility: HOSPITAL | Age: 75
End: 2025-08-12
Payer: MEDICARE

## 2025-08-12 DIAGNOSIS — Z78.0 MENOPAUSE: Primary | ICD-10-CM

## (undated) DEVICE — Device

## (undated) DEVICE — UNDERCAST PADDING: Brand: DEROYAL

## (undated) DEVICE — 3M™ IOBAN™ 2 ANTIMICROBIAL INCISE DRAPE 6650EZ: Brand: IOBAN™ 2

## (undated) DEVICE — GLV SURG PREMIERPRO MIC LTX PF SZ8 BRN

## (undated) DEVICE — DISPOSABLE TOURNIQUET CUFF SINGLE BLADDER, DUAL PORT AND QUICK CONNECT CONNECTOR: Brand: COLOR CUFF

## (undated) DEVICE — DRSNG GZ CURAD XEROFORM NONADHR OVERWRAP 5X9IN

## (undated) DEVICE — BIT DRL QC W DEPTH MARK 2X140MM

## (undated) DEVICE — CVR HNDL LT SURG ACCSSRY BLU STRL

## (undated) DEVICE — BIT DRL QC DIA 2.5X110MM

## (undated) DEVICE — PK EXTREM LOWR 10

## (undated) DEVICE — SPNG GZ WOVN 4X4IN 12PLY 10/BX STRL

## (undated) DEVICE — GOWN,REINF,POLY,ECL,PP SLV,XL: Brand: MEDLINE

## (undated) DEVICE — GW THRD 1.25X150MM FOR 3.5 4MM CANN SCRW

## (undated) DEVICE — SNAP KOVER: Brand: UNBRANDED

## (undated) DEVICE — STERILE PVP: Brand: MEDLINE INDUSTRIES, INC.

## (undated) DEVICE — 3M™ STERI-DRAPE™ U-DRAPE 1015: Brand: STERI-DRAPE™